# Patient Record
Sex: FEMALE | Race: WHITE | NOT HISPANIC OR LATINO | Employment: FULL TIME | URBAN - METROPOLITAN AREA
[De-identification: names, ages, dates, MRNs, and addresses within clinical notes are randomized per-mention and may not be internally consistent; named-entity substitution may affect disease eponyms.]

---
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
## 2019-01-03 ENCOUNTER — OFFICE VISIT - RIVER FALLS (OUTPATIENT)
Dept: FAMILY MEDICINE | Facility: CLINIC | Age: 19
End: 2019-01-03

## 2019-01-14 ENCOUNTER — OFFICE VISIT - RIVER FALLS (OUTPATIENT)
Dept: FAMILY MEDICINE | Facility: CLINIC | Age: 19
End: 2019-01-14

## 2019-01-14 ASSESSMENT — MIFFLIN-ST. JEOR: SCORE: 1414.63

## 2019-02-28 ENCOUNTER — OFFICE VISIT - RIVER FALLS (OUTPATIENT)
Dept: FAMILY MEDICINE | Facility: CLINIC | Age: 19
End: 2019-02-28

## 2019-02-28 LAB
CLUE CELLS: NORMAL
TRICHOMONAS, WET PREP: NORMAL
YEAST, WET PREP: PRESENT

## 2019-02-28 ASSESSMENT — MIFFLIN-ST. JEOR: SCORE: 1430.05

## 2019-04-06 ENCOUNTER — TRANSFERRED RECORDS (OUTPATIENT)
Dept: HEALTH INFORMATION MANAGEMENT | Facility: CLINIC | Age: 19
End: 2019-04-06

## 2019-04-17 ENCOUNTER — OFFICE VISIT - RIVER FALLS (OUTPATIENT)
Dept: FAMILY MEDICINE | Facility: CLINIC | Age: 19
End: 2019-04-17

## 2019-04-17 LAB
ALBUMIN UR-MCNC: NEGATIVE G/DL
BILIRUB UR QL STRIP: NEGATIVE
GLUCOSE UR STRIP-MCNC: NEGATIVE MG/DL
HCG UR QL: NEGATIVE
HGB UR QL STRIP: NEGATIVE
KETONES UR STRIP-MCNC: NEGATIVE MG/DL
LEUKOCYTE ESTERASE UR QL STRIP: NEGATIVE
NITRATE UR QL: NEGATIVE
PH UR STRIP: 5.5 [PH] (ref 5–8)
SP GR UR STRIP: 1.02 (ref 1–1.03)

## 2019-04-17 ASSESSMENT — MIFFLIN-ST. JEOR: SCORE: 1450.92

## 2019-04-18 LAB — BACTERIA SPEC CULT: NORMAL

## 2019-04-24 ENCOUNTER — MEDICAL CORRESPONDENCE (OUTPATIENT)
Dept: HEALTH INFORMATION MANAGEMENT | Facility: CLINIC | Age: 19
End: 2019-04-24

## 2019-04-24 ENCOUNTER — OFFICE VISIT - RIVER FALLS (OUTPATIENT)
Dept: FAMILY MEDICINE | Facility: CLINIC | Age: 19
End: 2019-04-24

## 2019-04-24 ENCOUNTER — TRANSFERRED RECORDS (OUTPATIENT)
Dept: HEALTH INFORMATION MANAGEMENT | Facility: CLINIC | Age: 19
End: 2019-04-24

## 2019-04-25 LAB
A/G RATIO - HISTORICAL: 1.8 (ref 1–2.5)
ALBUMIN SERPL-MCNC: 4.4 GM/DL (ref 3.6–5.1)
ALP SERPL-CCNC: 48 UNIT/L (ref 47–176)
ALT SERPL W P-5'-P-CCNC: 15 UNIT/L (ref 5–32)
AST SERPL W P-5'-P-CCNC: 14 UNIT/L (ref 12–32)
BILIRUB DIRECT SERPL-MCNC: 0.1 MG/DL
BILIRUB INDIRECT SERPL-MCNC: 0.3 MG/DL (ref 0.2–1.1)
BILIRUB SERPL-MCNC: 0.4 MG/DL (ref 0.2–1.1)
BUN SERPL-MCNC: 16 MG/DL (ref 7–20)
BUN/CREAT RATIO - HISTORICAL: NORMAL (ref 6–22)
CALCIUM SERPL-MCNC: 9.5 MG/DL (ref 8.9–10.4)
CHLORIDE BLD-SCNC: 104 MMOL/L (ref 98–110)
CO2 SERPL-SCNC: 28 MMOL/L (ref 20–32)
CREAT SERPL-MCNC: 0.92 MG/DL (ref 0.5–1)
EGFRCR SERPLBLD CKD-EPI 2021: 90 ML/MIN/1.73M2
ERYTHROCYTE [DISTWIDTH] IN BLOOD BY AUTOMATED COUNT: 12.2 % (ref 11–15)
GLOBULIN: 2.5 (ref 2–3.8)
GLUCOSE BLD-MCNC: 99 MG/DL (ref 65–99)
HCT VFR BLD AUTO: 36.2 % (ref 35–45)
HGB BLD-MCNC: 12.3 GM/DL (ref 11.7–15.5)
LIPASE SERPL-CCNC: 26 UNIT/L (ref 7–60)
MCH RBC QN AUTO: 28.6 PG (ref 27–33)
MCHC RBC AUTO-ENTMCNC: 34 GM/DL (ref 32–36)
MCV RBC AUTO: 84.2 FL (ref 80–100)
PLATELET # BLD AUTO: 320 10*3/UL (ref 140–400)
PMV BLD: 10.4 FL (ref 7.5–12.5)
POTASSIUM BLD-SCNC: 4.3 MMOL/L (ref 3.8–5.1)
PROT SERPL-MCNC: 6.9 GM/DL (ref 6.3–8.2)
RBC # BLD AUTO: 4.3 10*6/UL (ref 3.8–5.1)
SODIUM SERPL-SCNC: 139 MMOL/L (ref 135–146)
WBC # BLD AUTO: 9.1 10*3/UL (ref 3.8–10.8)

## 2019-04-29 ENCOUNTER — OFFICE VISIT - RIVER FALLS (OUTPATIENT)
Dept: FAMILY MEDICINE | Facility: CLINIC | Age: 19
End: 2019-04-29

## 2019-05-01 ENCOUNTER — TELEPHONE (OUTPATIENT)
Dept: GASTROENTEROLOGY | Facility: CLINIC | Age: 19
End: 2019-05-01

## 2019-05-01 NOTE — TELEPHONE ENCOUNTER
[facility] Intake form:    Referring/Requesting provider and Health care System: Dr. Deepthi Cormier [facility]    Requested provider (if specified): Clay    Has patient been evaluated in clinic or had a procedure [facility] provider in the last 5 years: No     Indication/Diagnosis for consultation: Abdominal pain, Spasm, Sphincter of Oddi    Is diagnosis on list of approved diagnosis: yes    Has patient been evaluated by another Gastroenterologist? Unknown     Imaging completed:     CT scan    Yes   MRI         unknown         Procedures:     Upper Endoscopy/EGD   unknown    Endoscopic Ultrasound/EUS unknown    ERCP      unknown    Colonoscopy   unknown    Are images able/being pushed to our system? Yes, Images done through [employer]    Is patient aware of request for clinc consultation and ok to be contacted to schedule? unknown      READ TO REFERRING CLINIC:  Due to high demands for our services our clinic requires all records including imaging studies be mailed and faxed to our facility prior to scheduling. Records should be faxed within 24-72 hours of referral if possible and images should be pushed to our PACS system or received by mail within 72 hours of referral. Our office will NOT call to follow up or request records.  Our clinic will not be able to process, schedule or contact patient without records and Images for MD review process. Once records have been reviewed and recommendation/orders have been made the patient will be contacted to schedule. If records have not been received within 2 weeks of initial referral call, referring office will be notified by letter and referral will be closed. If an appointment is unable to be offered at this time we will inform the referring office and patient via letter.

## 2019-05-03 NOTE — TELEPHONE ENCOUNTER
"Advanced [facility]oscopy     Referring provider: Dr. Deepthi Cormier [facility]      Referred to: [facility]     Provider Requested: Clay     Referral Received:  5/1/19     Records received: 5/3/19     Images received: na    Evaluation for: SOD     Clinical History (per RN review):     Per clinic note from PCP on 4/2619- 18 year old female, s/p resection of GB at age 17, has had intermittent episodes of abd pain since then, but this time the pain and nausea is related to every time she eats, it worsens with meals but never goes away. No dysuria.Clinic note from 9/10/18 also mentions pain since GB removal- inconsistent BMs, intermittent abd pain, weight loss vs weight gain.    - clinic notes from 1/5/18 note \"nauseated after eating once a month since 7th grade, worse this week\" Also noted then that fatty foods trigger her nausea    Abd US 1/26/18 showed GB sludge with no GB wall thickening. Lap Savita 2/21/18    Imaging    CT Abd/Pelvis 4/6/19  - small amount free fluid in right adnexa and cul-de-sac likely explains patients pain from a ruptured ovarian cyst or ovulation  - appendicolith w/out evidence of appendicitis    CT Abd/Pelvis for RLQ pain 9/11/18- Read scanned in  - trace free fluid in the dependent pelvis is nonspecific. This may be physiologic.  - no bowel obstruction, normal appendix  - normal kidneys, ureters    LABS: WNL on 4/26/19  Alk Phos 48  AST 14  ALT 15    * all prior labs also WNL    Ovarian cyst rupture 4/17/19    MD review date: 5/6/19  MD Decision for clinic consultation/Orders: refer to luminal GI- called [facility] to advise           Referral updates/Patient contacted: 5/3/19 talked to Meseret advised we've received referral and will get back to her. Called 5/7/19, advised case was referred to general GI    "

## 2019-05-07 ENCOUNTER — COMMUNICATION - RIVER FALLS (OUTPATIENT)
Dept: FAMILY MEDICINE | Facility: CLINIC | Age: 19
End: 2019-05-07

## 2019-05-07 NOTE — TELEPHONE ENCOUNTER
Ignacio Angulo,    Has this patient been seen in our system yet? Unfortunately I cannot schedule new patients that have not gone through the luminal GI referral process.     Thanks,    Violet

## 2019-05-10 ENCOUNTER — OFFICE VISIT - RIVER FALLS (OUTPATIENT)
Dept: FAMILY MEDICINE | Facility: CLINIC | Age: 19
End: 2019-05-10

## 2019-05-21 ENCOUNTER — OFFICE VISIT - RIVER FALLS (OUTPATIENT)
Dept: FAMILY MEDICINE | Facility: CLINIC | Age: 19
End: 2019-05-21

## 2019-05-24 LAB
ADRENOCORTICOTROPIC HORMONE, P - HISTORICAL: 51 PG/ML (ref 6–50)
CORTISOL: 32.5 MCG/DL
FSH - HISTORICAL: 5.5 MIU/ML
GROWTH HORMONE: 1.5 NG/ML
LUTEINIZING HORMONE - HISTORICAL: 5.6 MIU/ML
PROLACTIN: 22.4 NG/ML
T3FREE SERPL-MCNC: 2.5 PG/ML (ref 3–4.7)
T4 FREE SERPL-MCNC: 1 NG/DL (ref 0.8–1.4)
TSH SERPL DL<=0.005 MIU/L-ACNC: 53.01 MIU/L

## 2019-05-28 ENCOUNTER — COMMUNICATION - RIVER FALLS (OUTPATIENT)
Dept: FAMILY MEDICINE | Facility: CLINIC | Age: 19
End: 2019-05-28

## 2019-10-10 ENCOUNTER — OFFICE VISIT - RIVER FALLS (OUTPATIENT)
Dept: FAMILY MEDICINE | Facility: CLINIC | Age: 19
End: 2019-10-10

## 2019-10-10 LAB
ANION GAP SERPL CALCULATED.3IONS-SCNC: 9 MMOL/L (ref 5–18)
BASOPHILS # BLD MANUAL: 0 10*3/UL
BASOPHILS NFR BLD MANUAL: 0.4 %
BUN SERPL-MCNC: 12 MG/DL (ref 8–25)
BUN/CREAT RATIO - HISTORICAL: 13 (ref 10–20)
CALCIUM SERPL-MCNC: 10.3 MG/DL (ref 8.5–10.5)
CHLORIDE BLD-SCNC: 107 MMOL/L (ref 98–110)
CO2 SERPL-SCNC: 23 MMOL/L (ref 21–31)
CREAT SERPL-MCNC: 0.89 MG/DL (ref 0.57–1.11)
EOSINOPHIL # BLD MANUAL: 0.2 10*3/UL
EOSINOPHIL NFR BLD MANUAL: 3.1 %
ERYTHROCYTE [DISTWIDTH] IN BLOOD BY AUTOMATED COUNT: 12.5 % (ref 11.5–15.5)
GFR ESTIMATE EXT - HISTORICAL: >60
GLUCOSE BLD-MCNC: 106 MG/DL (ref 65–100)
HCT VFR BLD AUTO: 35.7 % (ref 33–51)
HGB BLD-MCNC: 12.3 G/DL (ref 12–16)
LYMPHOCYTES # BLD MANUAL: 1.9 10*3/UL (ref 0.9–2.9)
LYMPHOCYTES NFR BLD MANUAL: 24.5 %
MCH RBC QN AUTO: 28.1 PG (ref 26–34)
MCHC RBC AUTO-ENTMCNC: 34.5 G/DL (ref 32–36)
MCV RBC AUTO: 82 FL (ref 80–100)
MONOCYTES # BLD MANUAL: 0.6 10*3/UL
MONOCYTES NFR BLD MANUAL: 7.9 %
NEUTROPHILS # BLD MANUAL: 4.9 10*3/UL (ref 1.7–7)
NEUTROPHILS NFR BLD MANUAL: 64.1 %
PLATELET # BLD AUTO: 281 10*3/UL (ref 140–440)
PMV BLD: 9.7 FL (ref 6.5–11)
POTASSIUM BLD-SCNC: 4 MMOL/L (ref 3.5–5)
RBC # BLD AUTO: 4.37 10*6/UL (ref 4–5.2)
SODIUM SERPL-SCNC: 139 MMOL/L (ref 135–145)
WBC # BLD AUTO: 7.6 10*3/UL (ref 4.5–11)

## 2019-10-10 ASSESSMENT — MIFFLIN-ST. JEOR: SCORE: 1429.14

## 2019-10-23 ENCOUNTER — OFFICE VISIT - RIVER FALLS (OUTPATIENT)
Dept: FAMILY MEDICINE | Facility: CLINIC | Age: 19
End: 2019-10-23

## 2019-10-23 LAB
ALBUMIN UR-MCNC: NEGATIVE G/DL
AMYLASE SERPL-CCNC: 36 IU/L (ref 25–125)
ANION GAP SERPL CALCULATED.3IONS-SCNC: 10 MMOL/L (ref 5–18)
BACTERIA #/AREA URNS HPF: NORMAL /[HPF]
BILIRUB UR QL STRIP: NEGATIVE
BUN SERPL-MCNC: 12 MG/DL (ref 8–25)
BUN/CREAT RATIO - HISTORICAL: 14 (ref 10–20)
CALCIUM SERPL-MCNC: 9.6 MG/DL (ref 8.5–10.5)
CHLORIDE BLD-SCNC: 106 MMOL/L (ref 98–110)
CO2 SERPL-SCNC: 24 MMOL/L (ref 21–31)
CREAT SERPL-MCNC: 0.86 MG/DL (ref 0.57–1.11)
EPITHELIAL CELLS UR: NORMAL
ERYTHROCYTE [DISTWIDTH] IN BLOOD BY AUTOMATED COUNT: 12.7 % (ref 11.5–15.5)
GFR ESTIMATE EXT - HISTORICAL: >60
GLUCOSE BLD-MCNC: 111 MG/DL (ref 65–100)
GLUCOSE UR STRIP-MCNC: NEGATIVE MG/DL
HCG UR QL: NEGATIVE
HCT VFR BLD AUTO: 40 % (ref 33–51)
HGB BLD-MCNC: 13.8 G/DL (ref 12–16)
HGB UR QL STRIP: ABNORMAL
KETONES UR STRIP-MCNC: ABNORMAL MG/DL
LEUKOCYTE ESTERASE UR QL STRIP: NEGATIVE
MCH RBC QN AUTO: 28.2 PG (ref 26–34)
MCHC RBC AUTO-ENTMCNC: 34.5 G/DL (ref 32–36)
MCV RBC AUTO: 82 FL (ref 80–100)
NITRATE UR QL: NEGATIVE
PH UR STRIP: 6 [PH] (ref 5–8)
PLATELET # BLD AUTO: 302 10*3/UL (ref 140–440)
PMV BLD: 10 FL (ref 6.5–11)
POTASSIUM BLD-SCNC: 4.1 MMOL/L (ref 3.5–5)
RBC # BLD AUTO: 4.9 10*6/UL (ref 4–5.2)
RBC #/AREA URNS AUTO: NORMAL /[HPF]
SODIUM SERPL-SCNC: 140 MMOL/L (ref 135–145)
SP GR UR STRIP: 1.02 (ref 1–1.03)
WBC # BLD AUTO: 20.8 10*3/UL (ref 4.5–11)
WBC #/AREA URNS AUTO: NORMAL /[HPF]

## 2019-10-24 LAB
A/G RATIO - HISTORICAL: 1.6 (ref 1–2.5)
ALBUMIN SERPL-MCNC: 4.6 GM/DL (ref 3.6–5.1)
ALP SERPL-CCNC: 46 UNIT/L (ref 47–176)
ALT SERPL W P-5'-P-CCNC: 10 UNIT/L (ref 5–32)
AST SERPL W P-5'-P-CCNC: 12 UNIT/L (ref 12–32)
BILIRUB DIRECT SERPL-MCNC: 0.1 MG/DL
BILIRUB INDIRECT SERPL-MCNC: 0.4 MG/DL (ref 0.2–1.1)
BILIRUB SERPL-MCNC: 0.5 MG/DL (ref 0.2–1.1)
GLOBULIN: 2.8 (ref 2–3.8)
LIPASE SERPL-CCNC: 10 UNIT/L (ref 7–60)
PROT SERPL-MCNC: 7.4 GM/DL (ref 6.3–8.2)
TSH SERPL DL<=0.005 MIU/L-ACNC: 1.25 MIU/L

## 2019-11-12 ENCOUNTER — OFFICE VISIT - RIVER FALLS (OUTPATIENT)
Dept: FAMILY MEDICINE | Facility: CLINIC | Age: 19
End: 2019-11-12

## 2019-11-12 ASSESSMENT — MIFFLIN-ST. JEOR: SCORE: 1410.09

## 2020-09-09 ENCOUNTER — OFFICE VISIT - RIVER FALLS (OUTPATIENT)
Dept: FAMILY MEDICINE | Facility: CLINIC | Age: 20
End: 2020-09-09

## 2020-09-09 ENCOUNTER — COMMUNICATION - RIVER FALLS (OUTPATIENT)
Dept: FAMILY MEDICINE | Facility: CLINIC | Age: 20
End: 2020-09-09

## 2020-09-09 ENCOUNTER — AMBULATORY - RIVER FALLS (OUTPATIENT)
Dept: FAMILY MEDICINE | Facility: CLINIC | Age: 20
End: 2020-09-09

## 2020-09-09 LAB — DEPRECATED S PYO AG THROAT QL EIA: NOT DETECTED

## 2020-12-03 ENCOUNTER — OFFICE VISIT - RIVER FALLS (OUTPATIENT)
Dept: FAMILY MEDICINE | Facility: CLINIC | Age: 20
End: 2020-12-03

## 2020-12-03 LAB
CLUE CELLS: NORMAL
TRICHOMONAS, WET PREP: NORMAL
YEAST, WET PREP: PRESENT

## 2020-12-04 ENCOUNTER — COMMUNICATION - RIVER FALLS (OUTPATIENT)
Dept: FAMILY MEDICINE | Facility: CLINIC | Age: 20
End: 2020-12-04

## 2020-12-04 LAB
CHLAMYDIA TRACHOMATIS RNA, TMA - QUEST: NOT DETECTED
NEISSERIA GONORRHOEAE RNA TMA: NOT DETECTED

## 2020-12-07 ENCOUNTER — COMMUNICATION - RIVER FALLS (OUTPATIENT)
Dept: FAMILY MEDICINE | Facility: CLINIC | Age: 20
End: 2020-12-07

## 2020-12-30 ENCOUNTER — AMBULATORY - RIVER FALLS (OUTPATIENT)
Dept: FAMILY MEDICINE | Facility: CLINIC | Age: 20
End: 2020-12-30

## 2020-12-30 ENCOUNTER — COMMUNICATION - RIVER FALLS (OUTPATIENT)
Dept: FAMILY MEDICINE | Facility: CLINIC | Age: 20
End: 2020-12-30

## 2020-12-30 ENCOUNTER — OFFICE VISIT - RIVER FALLS (OUTPATIENT)
Dept: FAMILY MEDICINE | Facility: CLINIC | Age: 20
End: 2020-12-30

## 2021-01-04 LAB — SARS-COV-2 RNA RESP QL NAA+PROBE: NEGATIVE

## 2021-01-11 ENCOUNTER — OFFICE VISIT - RIVER FALLS (OUTPATIENT)
Dept: FAMILY MEDICINE | Facility: CLINIC | Age: 21
End: 2021-01-11

## 2021-01-11 ENCOUNTER — COMMUNICATION - RIVER FALLS (OUTPATIENT)
Dept: FAMILY MEDICINE | Facility: CLINIC | Age: 21
End: 2021-01-11

## 2021-01-12 ENCOUNTER — OFFICE VISIT - RIVER FALLS (OUTPATIENT)
Dept: FAMILY MEDICINE | Facility: CLINIC | Age: 21
End: 2021-01-12

## 2021-01-12 ENCOUNTER — AMBULATORY - RIVER FALLS (OUTPATIENT)
Dept: FAMILY MEDICINE | Facility: CLINIC | Age: 21
End: 2021-01-12

## 2021-01-12 LAB — DEPRECATED S PYO AG THROAT QL EIA: NOT DETECTED

## 2021-01-18 LAB — SARS-COV-2 RNA RESP QL NAA+PROBE: NEGATIVE

## 2021-02-26 ENCOUNTER — AMBULATORY - RIVER FALLS (OUTPATIENT)
Dept: FAMILY MEDICINE | Facility: CLINIC | Age: 21
End: 2021-02-26

## 2021-03-01 ENCOUNTER — AMBULATORY - RIVER FALLS (OUTPATIENT)
Dept: FAMILY MEDICINE | Facility: CLINIC | Age: 21
End: 2021-03-01

## 2021-03-08 ENCOUNTER — AMBULATORY - RIVER FALLS (OUTPATIENT)
Dept: FAMILY MEDICINE | Facility: CLINIC | Age: 21
End: 2021-03-08

## 2021-03-11 ENCOUNTER — AMBULATORY - RIVER FALLS (OUTPATIENT)
Dept: FAMILY MEDICINE | Facility: CLINIC | Age: 21
End: 2021-03-11

## 2021-04-07 ENCOUNTER — OFFICE VISIT - RIVER FALLS (OUTPATIENT)
Dept: FAMILY MEDICINE | Facility: CLINIC | Age: 21
End: 2021-04-07

## 2021-04-07 ASSESSMENT — MIFFLIN-ST. JEOR: SCORE: 1478.47

## 2021-04-08 LAB
CHLAMYDIA TRACHOMATIS RNA, TMA - QUEST: NOT DETECTED
NEISSERIA GONORRHOEAE RNA TMA: NOT DETECTED

## 2021-04-30 ENCOUNTER — OFFICE VISIT - RIVER FALLS (OUTPATIENT)
Dept: FAMILY MEDICINE | Facility: CLINIC | Age: 21
End: 2021-04-30

## 2021-04-30 ASSESSMENT — MIFFLIN-ST. JEOR: SCORE: 1483.92

## 2021-06-10 ENCOUNTER — TRANSFERRED RECORDS (OUTPATIENT)
Dept: MULTI SPECIALTY CLINIC | Facility: CLINIC | Age: 21
End: 2021-06-10

## 2021-06-10 LAB — PAP-ABSTRACT: NORMAL

## 2022-01-14 ENCOUNTER — OFFICE VISIT - RIVER FALLS (OUTPATIENT)
Dept: FAMILY MEDICINE | Facility: CLINIC | Age: 22
End: 2022-01-14

## 2022-01-16 LAB
CHLAMYDIA TRACHOMATIS RNA, TMA - QUEST: NOT DETECTED
NEISSERIA GONORRHOEAE RNA TMA: NOT DETECTED

## 2022-01-24 ENCOUNTER — OFFICE VISIT - RIVER FALLS (OUTPATIENT)
Dept: FAMILY MEDICINE | Facility: CLINIC | Age: 22
End: 2022-01-24

## 2022-01-24 ENCOUNTER — LAB REQUISITION (OUTPATIENT)
Dept: LAB | Facility: CLINIC | Age: 22
End: 2022-01-24
Payer: COMMERCIAL

## 2022-01-24 DIAGNOSIS — U07.1 COVID-19: ICD-10-CM

## 2022-01-24 PROCEDURE — U0003 INFECTIOUS AGENT DETECTION BY NUCLEIC ACID (DNA OR RNA); SEVERE ACUTE RESPIRATORY SYNDROME CORONAVIRUS 2 (SARS-COV-2) (CORONAVIRUS DISEASE [COVID-19]), AMPLIFIED PROBE TECHNIQUE, MAKING USE OF HIGH THROUGHPUT TECHNOLOGIES AS DESCRIBED BY CMS-2020-01-R: HCPCS | Mod: ORL | Performed by: FAMILY MEDICINE

## 2022-01-25 ENCOUNTER — COMMUNICATION - RIVER FALLS (OUTPATIENT)
Dept: FAMILY MEDICINE | Facility: CLINIC | Age: 22
End: 2022-01-25

## 2022-01-25 LAB — SARS-COV-2 RNA RESP QL NAA+PROBE: POSITIVE

## 2022-01-26 LAB — SARS-COV-2 RNA RESP QL NAA+PROBE: POSITIVE

## 2022-02-11 VITALS
OXYGEN SATURATION: 99 % | DIASTOLIC BLOOD PRESSURE: 60 MMHG | TEMPERATURE: 98.2 F | HEART RATE: 88 BPM | WEIGHT: 159.9 LBS | SYSTOLIC BLOOD PRESSURE: 124 MMHG | BODY MASS INDEX: 28.55 KG/M2

## 2022-02-11 VITALS
SYSTOLIC BLOOD PRESSURE: 110 MMHG | WEIGHT: 139.6 LBS | HEIGHT: 63 IN | DIASTOLIC BLOOD PRESSURE: 64 MMHG | WEIGHT: 140.2 LBS | SYSTOLIC BLOOD PRESSURE: 122 MMHG | TEMPERATURE: 97.7 F | DIASTOLIC BLOOD PRESSURE: 64 MMHG | TEMPERATURE: 100.6 F | DIASTOLIC BLOOD PRESSURE: 68 MMHG | WEIGHT: 143 LBS | OXYGEN SATURATION: 99 % | HEART RATE: 60 BPM | WEIGHT: 144.75 LBS | SYSTOLIC BLOOD PRESSURE: 122 MMHG | HEART RATE: 112 BPM | DIASTOLIC BLOOD PRESSURE: 75 MMHG | BODY MASS INDEX: 24.84 KG/M2 | DIASTOLIC BLOOD PRESSURE: 52 MMHG | HEART RATE: 70 BPM | SYSTOLIC BLOOD PRESSURE: 110 MMHG | DIASTOLIC BLOOD PRESSURE: 60 MMHG | BODY MASS INDEX: 25.53 KG/M2 | HEART RATE: 75 BPM | HEART RATE: 96 BPM | HEIGHT: 63 IN | SYSTOLIC BLOOD PRESSURE: 117 MMHG | HEART RATE: 73 BPM | BODY MASS INDEX: 25.65 KG/M2 | SYSTOLIC BLOOD PRESSURE: 100 MMHG | TEMPERATURE: 97.7 F | TEMPERATURE: 97.5 F | TEMPERATURE: 98.3 F | BODY MASS INDEX: 25.34 KG/M2 | HEIGHT: 63 IN | TEMPERATURE: 98.3 F | HEIGHT: 63 IN | WEIGHT: 138 LBS

## 2022-02-12 VITALS
SYSTOLIC BLOOD PRESSURE: 110 MMHG | TEMPERATURE: 98.5 F | DIASTOLIC BLOOD PRESSURE: 72 MMHG | TEMPERATURE: 98.8 F | DIASTOLIC BLOOD PRESSURE: 60 MMHG | DIASTOLIC BLOOD PRESSURE: 64 MMHG | WEIGHT: 158.6 LBS | SYSTOLIC BLOOD PRESSURE: 130 MMHG | HEIGHT: 63 IN | WEIGHT: 160 LBS | TEMPERATURE: 98.2 F | BODY MASS INDEX: 27.5 KG/M2 | TEMPERATURE: 97.4 F | DIASTOLIC BLOOD PRESSURE: 60 MMHG | BODY MASS INDEX: 28.1 KG/M2 | HEART RATE: 83 BPM | HEART RATE: 92 BPM | DIASTOLIC BLOOD PRESSURE: 70 MMHG | BODY MASS INDEX: 27.54 KG/M2 | BODY MASS INDEX: 28.32 KG/M2 | SYSTOLIC BLOOD PRESSURE: 118 MMHG | HEART RATE: 80 BPM | OXYGEN SATURATION: 96 % | SYSTOLIC BLOOD PRESSURE: 90 MMHG | HEART RATE: 68 BPM | OXYGEN SATURATION: 98 % | HEIGHT: 63 IN | WEIGHT: 157.4 LBS | WEIGHT: 155.4 LBS | WEIGHT: 154 LBS | SYSTOLIC BLOOD PRESSURE: 122 MMHG | SYSTOLIC BLOOD PRESSURE: 100 MMHG | HEART RATE: 84 BPM | WEIGHT: 155.4 LBS | HEART RATE: 74 BPM | DIASTOLIC BLOOD PRESSURE: 66 MMHG | BODY MASS INDEX: 27.75 KG/M2 | TEMPERATURE: 97.7 F | BODY MASS INDEX: 28.35 KG/M2 | OXYGEN SATURATION: 99 %

## 2022-02-12 VITALS
BODY MASS INDEX: 29.27 KG/M2 | WEIGHT: 166.4 LBS | BODY MASS INDEX: 29.48 KG/M2 | SYSTOLIC BLOOD PRESSURE: 140 MMHG | TEMPERATURE: 98.1 F | WEIGHT: 165.2 LBS | HEART RATE: 90 BPM | HEIGHT: 63 IN | DIASTOLIC BLOOD PRESSURE: 66 MMHG | HEIGHT: 63 IN | SYSTOLIC BLOOD PRESSURE: 112 MMHG | OXYGEN SATURATION: 98 % | OXYGEN SATURATION: 98 % | TEMPERATURE: 97.6 F | DIASTOLIC BLOOD PRESSURE: 68 MMHG | HEART RATE: 94 BPM

## 2022-02-12 VITALS
SYSTOLIC BLOOD PRESSURE: 118 MMHG | TEMPERATURE: 98.9 F | WEIGHT: 155.2 LBS | HEART RATE: 72 BPM | OXYGEN SATURATION: 98 % | DIASTOLIC BLOOD PRESSURE: 64 MMHG | TEMPERATURE: 98.2 F | BODY MASS INDEX: 27.5 KG/M2 | HEART RATE: 74 BPM | DIASTOLIC BLOOD PRESSURE: 76 MMHG | DIASTOLIC BLOOD PRESSURE: 62 MMHG | WEIGHT: 151 LBS | SYSTOLIC BLOOD PRESSURE: 110 MMHG | HEART RATE: 86 BPM | HEIGHT: 63 IN | HEIGHT: 63 IN | SYSTOLIC BLOOD PRESSURE: 121 MMHG | BODY MASS INDEX: 26.75 KG/M2

## 2022-02-12 VITALS
HEART RATE: 74 BPM | DIASTOLIC BLOOD PRESSURE: 68 MMHG | WEIGHT: 151.4 LBS | WEIGHT: 152 LBS | BODY MASS INDEX: 26.93 KG/M2 | SYSTOLIC BLOOD PRESSURE: 124 MMHG | BODY MASS INDEX: 27.21 KG/M2 | BODY MASS INDEX: 27.03 KG/M2 | SYSTOLIC BLOOD PRESSURE: 128 MMHG | TEMPERATURE: 98.5 F | WEIGHT: 153.6 LBS | BODY MASS INDEX: 26.08 KG/M2 | HEIGHT: 63 IN | DIASTOLIC BLOOD PRESSURE: 78 MMHG | HEART RATE: 92 BPM | TEMPERATURE: 98.5 F | SYSTOLIC BLOOD PRESSURE: 140 MMHG | HEART RATE: 76 BPM | SYSTOLIC BLOOD PRESSURE: 118 MMHG | TEMPERATURE: 97.8 F | HEIGHT: 63 IN | HEIGHT: 63 IN | DIASTOLIC BLOOD PRESSURE: 76 MMHG | HEART RATE: 78 BPM | WEIGHT: 147.2 LBS | DIASTOLIC BLOOD PRESSURE: 70 MMHG

## 2022-02-15 NOTE — NURSING NOTE
PPD Reading POC Entered On:  3/1/2021 11:17 AM CST    Performed On:  3/1/2021 11:16 AM CST by Rita Mike RN               PPD Reading   PPD mm of Induration :   0 mm   PPD Interpretation :   Negative   PPD Completion Status :   Completed   PPD Result Comment :   Pt will return for step 2   Rita Mike RN - 3/1/2021 11:16 AM CST

## 2022-02-15 NOTE — PROGRESS NOTES
Chief Complaint    Nini presents for follow-up BV a couple week- increase discharge color is pink/gray some chunks, itchiness in genitle area x ongoing with some improvement noted than got worse about a week ago.  History of Present Illness      Chief complaint as above reviewed and confirmed with patient.  Pt presents to the clinic with concerns re: vaginal discharge change, vaginal irritation.  Seen for BV 3 weeks ago. That improved with tx with vaginal metronidazole.  This week passed some discharge that looked like tissue, friable, clumpy brown/blood tinged debris (brings picture, does not appear to be POC but possible some endometrial tissue)  denies any abdominal pain.  Recently had IUD placed and has string check in 1.5 weeks.  has had mild cramping and spotting following placement but that is generally improved.  spotting improved after passing some of the tissue as well.  Review of Systems      Review of systems is negative with the exception of those noted in HPI          Physical Exam   Vitals & Measurements    T: 97.6  F (Tympanic)  HR: 94 (Peripheral)  BP: 112/66  SpO2: 98%     HT: 63 in  WT: 166.4 lb  BMI: 29.47       External genitalia that of normal young female.      no genital lesions present      vaginal mucosa is well rugated      cervix is closed with small amount of blood tinged brownish discharge present.  no mucopurulent discharge. Strings visualized and appear to be well positioned IUD.       adnexa is without masses or tenderness      no CMT present       wet prep:  negative for yeast, clue cells or trichomonas.         Assessment/Plan       Spotting (N92.0)         likely due to recent IUD placement. reassured neg UPT.         Ordered:          Urine Pregnancy Test (Request), Priority: STAT, Vaginal discharge  Spotting          Wet Prep Vaginal (Request), Priority: STAT, Vaginal discharge  Spotting                Vaginal discharge (N89.8)         observation, tissue she passed likely  endometrial, but difficult to say for certain, possible from recent IUD pllacement.         reassured strings in place, observation, warm soaks , avoid topical irritants. fu prn         Ordered:          Urine Pregnancy Test (Request), Priority: STAT, Vaginal discharge  Spotting          Wet Prep Vaginal (Request), Priority: STAT, Vaginal discharge  Spotting           Patient Information     Name:OBI BLACKWOOD      Address:      [address] [place] [id number]     Sex:Female     YOB: 2000     [phone]     Emergency Contact:JESUS BLACKWOOD     MRN:[id number]     FIN:[id number]     Location:[facility]     Date of Service:04/30/2021      Primary Care Physician:       NONE ,       Attending Physician:       Bobbi MORGAN, Justina HARRIS, [phone]  Problem List/Past Medical History    Ongoing     Abnormal brain MRI       Comments: convex areaon pituitary seen on brain MRI     Chronic abdominal pain     Eczema     Hx of cholecystectomy     Mild intermittent asthma     Pituitary adenoma    Historical     No qualifying data  Procedure/Surgical History     Laparoscopic cholecystectomy (02/21/2018)           Extraction of wisdom tooth        Medications    iron polysaccharide (as elemental iron) 200 mg oral capsule, 200 mg= 1 cap(s), Oral, daily    levothyroxine 125 mcg (0.125 mg) oral tablet, 125 mcg= 1 tab(s), Oral, daily    metroNIDAZOLE 0.75% vaginal gel with applicator, 1 nickolas, Vaginal, hs  Allergies    Dust Mite    Omnicef (Hives)  Social History    Smoking Status     Never smoker     Electronic Cigarette/Vaping      Electronic Cigarette Use: has vaped in the past; nothing recently.     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     Home/Environment      Marital status: Single. Risks in environment: Unlocked guns, Stairs.     Nutrition/Health      Type of diet: Regular.     Sexual      Sexually active: Yes.  Identifies as female, Sexual orientation: Straight or heterosexual. Uses condoms: Yes. Contraceptive Use Details: Birth control pill.     Substance Abuse      Never     Tobacco      Never (less than 100 in lifetime)  Family History    Cancer: Uncle (M).    Depression: Mother.    Diabetes: Grandmother (P).    Hypertension: Grandfather (P).    Migraine: Mother.    Obese: Grandmother (P).  Immunizations      Vaccine Date Status          influenza virus vaccine, inactivated 02/26/2021 Given          human papillomavirus vaccine 03/20/2018 Recorded          human papillomavirus vaccine 07/24/2017 Recorded          meningococcal conjugate vaccine 03/13/2017 Recorded          human papillomavirus vaccine 03/13/2017 Recorded          tetanus/diphth/pertuss (Tdap) adult/adol 10/07/2011 Recorded          influenza virus vaccine, inactivated 12/21/2009 Recorded          influenza virus vaccine, inactivated 10/31/2008 Recorded          varicella 08/29/2008 Recorded          IPV 08/25/2005 Recorded          MMR (measles/mumps/rubella) 08/25/2005 Recorded          DTaP 08/25/2005 Recorded          IPV 05/10/2001 Recorded          DTaP 05/10/2001 Recorded          varicella 11/21/2000 Recorded          MMR (measles/mumps/rubella) 11/21/2000 Recorded          Hep B 11/21/2000 Recorded          Hib 07/21/2000 Recorded          DTaP 07/21/2000 Recorded          Hib (PRP-T) 07/21/2000 Recorded          IPV 04/10/2000 Recorded          DTaP 04/10/2000 Recorded          Hep B-Hib 04/10/2000 Recorded          IPV 02/14/2000 Recorded          DTaP 02/14/2000 Recorded          Hep B-Hib 02/14/2000 Recorded  Lab Results       Lab Results (Last 4 results within 90 days)        Chlam/N. gonorrhea Comments: See comment (04/07/21 16:38:00)       Chlamydia RNA: NOT DETECTED (04/07/21 16:38:00)       Neisseria gonorrhoeae RNA: NOT DETECTED (04/07/21 16:38:00)       POC Test Comments: Pascual chung (03/08/21 14:34:00)       POC Test Comments: Step  2 (03/08/21 14:34:00)

## 2022-02-15 NOTE — LETTER
(Inserted Image. Unable to display) May 30, 2019Re: BOI KAMARAOB:  2000 [facility]  [address] [place], [place] [address]Rh:  [facility] The following patient has been referred to your office/practice:   OBI BLACKWOOD Appointment : Patient made or is making her own appointmentLocation : [place] Please refer to the attached clinical documentation for a summary of OBI's care.  Please do not hesitate to contact our office if any additional clinical questions arise. All relevant records and transition of care documents should be mailed or faxed.Your assistance in providing continuity of care is appreciatedSincerely, [facility] & [address] [address](P) [phone](F) [phone]

## 2022-02-15 NOTE — LETTER
(Inserted Image. Unable to display)       April 25, 2019      OBI BLACKWOOD  [address]  [address] [place], [place] [id number]        Dear OBI,     Thank you for selecting [facility] for your healthcare needs. Below you will find the results of your recent test(s) done at our clinic.      Your results are normal!  Please come back for a follow up appointment if you are still having symptoms.       Result Name Current Result Previous Result Reference Range   Sodium Level (mmol/L)  139 4/24/2019  141 9/10/2018 135 - 146   Potassium Level (mmol/L)  4.3 4/24/2019  4.0 9/10/2018 3.8 - 5.1   Chloride Level (mmol/L)  104 4/24/2019  108 9/10/2018 98 - 110   CO2 Level (mmol/L)  28 4/24/2019  25 9/10/2018 20 - 32   Glucose Level (mg/dL)  99 4/24/2019  97 9/10/2018 65 - 99   BUN (mg/dL)  16 4/24/2019  8 9/10/2018 7 - 20   Creatinine Level (mg/dL)  0.92 4/24/2019  0.87 9/10/2018 0.50 - 1.00   BUN/Creat Ratio  NOT APPLICABLE 4/24/2019 ((L)) 9 9/10/2018 6 - 22   eGFR (mL/min/1.73m2)  90 4/24/2019  > OR = 60 -    eGFR  (mL/min/1.73m2)  105 4/24/2019  >60 9/10/2018 > OR = 60 -    Calcium Level (mg/dL)  9.5 4/24/2019  9.5 9/10/2018 8.9 - 10.4   Bilirubin Total (mg/dL)  0.4 4/24/2019  0.2 - 1.1   Bilirubin Direct (mg/dL)  0.1 4/24/2019   - < OR = 0.2   Bilirubin Indirect  0.3 4/24/2019  0.2 - 1.1   Alkaline Phosphatase (unit/L)  48 4/24/2019  47 - 176   AST/SGOT (unit/L)  14 4/24/2019  12 - 32   ALT/SGPT (unit/L)  15 4/24/2019  5 - 32   Protein Total (gm/dL)  6.9 4/24/2019  6.3 - 8.2   Albumin Level (gm/dL)  4.4 4/24/2019  3.6 - 5.1   Globulin  2.5 4/24/2019  2.0 - 3.8   A/G Ratio  1.8 4/24/2019  1.0 - 2.5   Lipase Level (unit/L)  26 4/24/2019  7 - 60   WBC  9.1 4/24/2019  8.4 9/11/2018 3.8 - 10.8   RBC  4.30 4/24/2019  4.34 9/11/2018 3.80 - 5.10   Hgb (gm/dL)  12.3 4/24/2019 ((L)) 11.9 9/11/2018 11.7 - 15.5   Hct (%)  36.2 4/24/2019  35.4 9/11/2018 35.0 - 45.0   MCV (fL)  84.2 4/24/2019   82 9/11/2018 80.0 - 100.0   MCH (pg)  28.6 4/24/2019  27.4 9/11/2018 27.0 - 33.0   MCHC (gm/dL)  34.0 4/24/2019  33.6 9/11/2018 32.0 - 36.0   RDW (%)  12.2 4/24/2019  13.1 9/11/2018 11.0 - 15.0   Platelet  320 4/24/2019  316 9/11/2018 140 - 400   MPV (fL)  10.4 4/24/2019  10.0 9/11/2018 7.5 - 12.5     Please contact my practice at [phone] if you have any questions or concerns.     Sincerely,        Deepthi Cormier MD      What do your labs mean?  Below is a glossary of commonly ordered labs:  LDL   Bad Cholesterol   HDL   Good Cholesterol  AST/ALT   Liver Function   Cr/Creatinine   Kidney Function  Microalbumin   Kidney Function  BUN   Kidney Function  PSA   Prostate    TSH   Thyroid Hormone  HgbA1c   Diabetes Test   Hgb (Hemoglobin)   Red Blood Cells

## 2022-02-15 NOTE — LETTER
(Inserted Image. Unable to display)                                                                                                [address] [facility] [address]                                                April 26, 2019Re: OBI FOYON2000Raheem Williamson M.D.[facility] [address] [place] [id number]: Dr. WilliamsonThe following patient has been referred to your office/practice:  OBI BLACKWOOD Appointment:  Appointment is PendingPlease refer to the attached  clinical documentation for a summary of OBI's care.  Please do not hesitate to contact our office if any additional clinical questions arise.  All relevant records and transition of care documents should be mailed or faxed.Your assistance in providing continuity of care is appreciated. Sincerely, [facility] & [place] [place] [address](P) [phone](F) [phone]

## 2022-02-15 NOTE — NURSING NOTE
PPD Administration POC Entered On:  3/8/2021 2:35 PM CST    Performed On:  3/8/2021 2:34 PM CST by Rita Mike RN               PPD Administration   PPD Insertion Site :   Left forearm   POC Test Comments :   Wheal created   PPD Amount Administered (mL) :   0.1 mL   Rita Mike RN - 3/8/2021 2:34 PM CST   Details   Collection Date :   3/8/2021 2:31 PM CST   Expiration Date :   9/19/2022 CDT   Lot#/Manufacture :   [id number]    :   [employer]   POC Test Comments :   Step 2   Rita Mike RN - 3/8/2021 2:34 PM CST

## 2022-02-15 NOTE — NURSING NOTE
Comprehensive Intake Entered On:  1/14/2019 1:05 PM CST    Performed On:  1/14/2019 1:01 PM CST by Katie Hickman CMA               Summary   Chief Complaint :   Follow up concussion, has improved in the last 5 days   Menstrual Status :   Menarcheal   Weight Measured :   152 lb(Converted to: 152 lb 0 oz, 68.95 kg)    Height Measured :   62.75 in(Converted to: 5 ft 3 in, 159.38 cm)    Body Mass Index :   27.14 kg/m2   Body Surface Area :   1.75 m2   Systolic Blood Pressure :   118 mmHg   Diastolic Blood Pressure :   68 mmHg   Mean Arterial Pressure :   85 mmHg   Peripheral Pulse Rate :   74 bpm   BP Site :   Right arm   Pulse Site :   Radial artery   BP Method :   Manual   HR Method :   Manual   Temperature Tympanic :   98.5 DegF(Converted to: 36.9 DegC)    Katie Hickman CMA - 1/14/2019 1:01 PM CST   Health Status   Allergies Verified? :   Yes   Medication History Verified? :   Yes   Immunizations Current :   No   Medical History Verified? :   No   Pre-Visit Planning Status :   Completed   Tobacco Use? :   Never smoker   Katie Hickman CMA - 1/14/2019 1:01 PM CST   Meds / Allergies   (As Of: 1/14/2019 1:05:24 PM CST)   Allergies (Active)   Omnicef  Estimated Onset Date:   Unspecified ; Reactions:   Hives ; Created By:   Isabel Estrada CMA; Reaction Status:   Active ; Category:   Drug ; Substance:   Omnicef ; Type:   Allergy ; Updated By:   Isabel Estrada CMA; Reviewed Date:   1/14/2019 1:03 PM CST        Medication List   (As Of: 1/14/2019 1:05:24 PM CST)   Prescription/Discharge Order    cyclobenzaprine  :   cyclobenzaprine ; Status:   Prescribed ; Ordered As Mnemonic:   cyclobenzaprine 10 mg oral tablet ; Simple Display Line:   10 mg, 1 tab(s), PO, TID, PRN: for spasm, 30 tab(s), 0 Refill(s) ; Ordering Provider:   Teo Hernandes PA-C; Catalog Code:   cyclobenzaprine ; Order Dt/Tm:   12/1/2018 9:29:22 AM            Home Meds    ethinyl estradiol-norgestimate  :   ethinyl estradiol-norgestimate ; Status:    Documented ; Ordered As Mnemonic:   Sprintec 0.25 mg-35 mcg oral tablet ; Simple Display Line:   1 tab(s), Oral, daily, 0 Refill(s) ; Catalog Code:   ethinyl estradiol-norgestimate ; Order Dt/Tm:   9/10/2018 9:34:25 AM

## 2022-02-15 NOTE — PROGRESS NOTES
Patient:   OBI BLACKWOOD            MRN: [id number]            FIN: [id number]               Age:   18 years     Sex:  Female     :  2000   Associated Diagnoses:   Brain concussion   Author:   Steven Malik MD      Visit Information      Date of Service: 2018 10:18 am  Performing Location: [facility]  Encounter#: [id number]      Primary Care Provider (PCP):  NONE ,       Referring Provider:  Steven Malik MD    NPI# [id number]      Chief Complaint   2018 10:21 AM CST  Follow up concussion from month ago, hit head again while skiing on Wed was wearing helmet at the time      History of Present Illness   Symptoms from previous concussion had significantly improved but not resolved. Still felt a little off and some headaches. Accidentally took a black kit and fell down hitting head. Did wear a helmet. Happened two days ago. Having headaches for an hour at a time. Hard to read when having the headache.      Review of Systems   Ear/Nose/Mouth/Throat:  No decreased hearing.    Respiratory:  No shortness of breath.    Cardiovascular:  No chest pain.    Gastrointestinal:  No vomiting.    Nausea      Health Status   Allergies:    Allergic Reactions (Selected)  Severity Not Documented  Omnicef (Hives)   Medications:  (Selected)   Prescriptions  Prescribed  cyclobenzaprine 10 mg oral tablet: = 1 tab(s) ( 10 mg ), PO, TID, PRN: for spasm, # 30 tab(s), 0 Refill(s), Type: Maintenance, [facility] [id number], 1 tab(s) Oral tid,PRN:for spasm  Documented Medications  Documented  Sprintec 0.25 mg-35 mcg oral tablet: 1 tab(s), Oral, daily, 0 Refill(s), Type: Maintenance   Problem list:    No problem items selected or recorded.      Histories   Social History: [employer] student. [place]. Working at [employer]. Starts [employer] term class in a week. Living at home for break         Physical Examination   Vital Signs   2018 10:21 AM CST Temperature Tympanic 97.8 DegF   LOW    Peripheral Pulse Rate 76 bpm    Pulse Site Radial artery    HR Method Manual    Systolic Blood Pressure 128 mmHg    Diastolic Blood Pressure 76 mmHg    Mean Arterial Pressure 93 mmHg    BP Site Right arm    BP Method Manual      Measurements from flowsheet : Measurements   12/28/2018 10:21 AM CST Height Measured - Standard 62.75 in    Weight Measured - Standard 153.6 lb    BSA 1.75 m2    Body Mass Index 27.42 kg/m2    Body Mass Index Percentile 89.07      General:  Alert and oriented, No acute distress.    Eye:  Pupils are equal, round and reactive to light, Extraocular movements are intact, Normal conjunctiva.    Neck:  No lymphadenopathy.    Respiratory:  Lungs are clear to auscultation, Respirations are non-labored.    Cardiovascular:  Normal rate, Regular rhythm.    Gastrointestinal:  Soft, Non-tender, Non-distended.    Musculoskeletal:  Normal range of motion, Normal strength, Normal gait, normal cervical ROM, minimal tenderness cervical spine.    Neurologic:  No focal deficits, Cranial Nerves II-XII are grossly intact, Normal deep tendon reflexes.    Psychiatric:  Appropriate mood & affect, Normal judgment.       Review / Management   Word recall: 5 words  Digits Backwards: 6 digits  Double stance: normal  Tandem stance: normal on second try  Single leg stance: put foot down after 3 seconds  Smooth pursuits: little headache  Horizontal saccades: little headache  Vertical saccades: little headache  Gaze stability horizontal: little headache  Gaze stability vertical: little headache  Convergence: little headache    SCAT2: 20 symptoms and 63pts    Discussed and did not feel CT head or cervical spine indicated      Impression and Plan   Diagnosis     Brain concussion (SDG13-SC S06.0X9A).     Discussed concussion and head injury precautions. May use tylenol for headache. Rest as much as possible. Decrease screen time. Attend school as able. Does not feel she needs work restrictions. Follow up in one week and  sooner if worse. Need to be more cautious in activity return given now two concussions in one month and second one before first one fully resolved

## 2022-02-15 NOTE — NURSING NOTE
IV Hydration    Vitals: See intake    Started 1000 cc of NS at 2:15pm for diagnosis of abdominal pain per ZIM  Pt presented back to clinic from Radiology with IV site in place in the left antecubital.    Patient was observed at 2:30pm and 2:45pm. No s/s of phlebitis or infiltration. Patient denies pain.      IV removal    IV stopped at 2:50pm and IV was kept in place. Saline lock applied and arm wrapped in coban. Pt is being transported by family to [place].

## 2022-02-15 NOTE — NURSING NOTE
Comprehensive Intake Entered On:  5/21/2019 3:43 PM CDT    Performed On:  5/21/2019 3:40 PM CDT by Lulu Hernadez CMA               Summary   Chief Complaint :   f/u pituitary MRI done    Menstrual Status :   Menarcheal   Weight Measured :   154 lb(Converted to: 154 lb 0 oz, 69.85 kg)    Systolic Blood Pressure :   130 mmHg   Diastolic Blood Pressure :   72 mmHg   Mean Arterial Pressure :   91 mmHg   Peripheral Pulse Rate :   92 bpm   BP Site :   Right arm   Pulse Site :   Radial artery   BP Method :   Manual   HR Method :   Manual   Lulu Hernadez CMA - 5/21/2019 3:40 PM CDT   Health Status   Allergies Verified? :   Yes   Medication History Verified? :   Yes   Immunizations Current :   No   Pre-Visit Planning Status :   Not completed   Lulu Hernadez CMA - 5/21/2019 3:40 PM CDT   Meds / Allergies   (As Of: 5/21/2019 3:43:04 PM CDT)   Allergies (Active)   Omnicef  Estimated Onset Date:   Unspecified ; Reactions:   Hives ; Created By:   Isabel Estrada CMA; Reaction Status:   Active ; Category:   Drug ; Substance:   Omnicef ; Type:   Allergy ; Updated By:   Isabel Estrada CMA; Reviewed Date:   4/17/2019 12:32 PM CDT        Medication List   (As Of: 5/21/2019 3:43:04 PM CDT)   Prescription/Discharge Order    ondansetron  :   ondansetron ; Status:   Prescribed ; Ordered As Mnemonic:   ondansetron 4 mg oral tablet, disintegrating ; Simple Display Line:   4 mg, 1 tab(s), PO, q8 hrs, 10 tab(s), 0 Refill(s) ; Ordering Provider:   Deepthi Cormier MD; Catalog Code:   ondansetron ; Order Dt/Tm:   4/24/2019 1:11:23 PM            Home Meds    ethinyl estradiol-norgestimate  :   ethinyl estradiol-norgestimate ; Status:   Documented ; Ordered As Mnemonic:   Sprintec 0.25 mg-35 mcg oral tablet ; Simple Display Line:   1 tab(s), Oral, daily, 0 Refill(s) ; Catalog Code:   ethinyl estradiol-norgestimate ; Order Dt/Tm:   9/10/2018 9:34:25 AM

## 2022-02-15 NOTE — PROGRESS NOTES
Chief Complaint    c/o worsening cold sx over the past wk--hurts to take a deep breath in, cough, congestion. also low grade fever last night.  History of Present Illness      Patient is here for cold symptoms.  She states that it hurts to take a deep breath in and has a cough.  Cough is non-productive.  Does c/o having some chest discomfort.  She also has a low grade fever.  Symptoms started Saturday/Sunday.  She does have hx of asthma in childhood but has outgrown sx since.  Review of Systems      Constitutional:  Low grade fever, No chills, No sweats, No weakness, No fatigue.            Eye:  No recent visual problem.            Ear/Nose/Mouth/Throat:  No decreased hearing, Nasal congestion, No sore throat.            Respiratory:  Shortness of breath, Cough, Sputum production.            Cardiovascular:  Negative, No chest pain, No palpitations, No peripheral edema.            Gastrointestinal:  No nausea, No vomiting, No diarrhea, No constipation, No heartburn.            Genitourinary:  No dysuria, No change in urine stream.            Hematology/Lymphatics:  No bruising tendency, No bleeding tendency.            Endocrine:  No cold intolerance, No heat intolerance.            Immunologic:  Negative.            Musculoskeletal:  No back pain, No neck pain, No joint pain, No muscle pain.  Chest pain.          Integumentary:  No rash, No dryness, No skin lesion.            Neurologic:  Alert and oriented X4, No headache.            Psychiatric:  No anxiety, No depression.         Physical Exam   Vitals & Measurements    T: 100.6   F (Tympanic)  HR: 112(Peripheral)  BP: 122/52     HT: 62.5 in  WT: 140.2 lb  BMI: 25.23       General:  Alert and oriented, No acute distress.            Eye:  Pupils are equal, round and reactive to light, Normal conjunctiva.            HENT:  Tympanic membranes are clear, Oral mucosa is moist, No pharyngeal erythema.            Neck:  Supple.            Respiratory:                   Respirations: Are within normal limits.                 Breath sounds: No wheezes present.  Airflow decreased.               Cough: Barking, Non-productive.                 left anterior chest wall tenderness          Cardiovascular:  Normal rate, Regular rhythm, No edema.            Gastrointestinal:  Non-distended.            Musculoskeletal:  Normal gait.           Integumentary:  Warm, No rash.            Psychiatric:  Cooperative, Appropriate mood & affect, Normal judgment.         Assessment/Plan       1. Chest wall pain (R07.89)         Pt will take Ibuprofen for pain relief.       2. Cough (R05)         Will send in a prescription for codeine-base cough syrup that she will take at night.  If fever worsens, cough becomes more productive or worsening body aches, pt will RTC for follow up.      IArmida MA, acted solely as a scribe for, and in the presence of Dr. Steven Tompkins who performed the services.             Steven COBB MD, personally performed the services described in this documentation.  The documentation was scribed in my presence and is both accurate and complete.                Patient Information     Name:OBI BLACKWOOD      Address:      [address] [place] [id number]     Sex:Female     YOB: 2000     [phone]     Emergency Contact:JESUS BLACKWOOD     MRN:[id number]     FIN:[id number]     Location:[facility]     Date of Service:09/19/2018      Primary Care Physician:       NONE ,       Attending Physician:       Steven Tompkins MD, [phone]  Problem List/Past Medical History    Ongoing     No qualifying data    Historical     No qualifying data  Medications     Sprintec 0.25 mg-35 mcg oral tablet: 1 tab(s), Oral, daily, 0 Refill(s).     Zofran 4 mg oral tablet: 4 mg, 1 tab(s), PO, q8 hrs, PRN: nausea, 10 tab(s), 1 Refill(s).     Promethazine with Codeine 6.25 mg-10 mg/5 mL oral syrup:  10 mL, Oral, q4 hrs, 120 mL, 1 Refill(s).          Allergies    Omnicef (Hives)  Social History    Smoking Status - 09/19/2018     Never smoker  Lab Results       Lab Results (Last 4 results within 90 days)        U HCG POC: NEGATIVE [21 mmol/L - 31 mmol/L] (09/11/18 11:44:00 CDT)       Sodium Level: 141 [135 mmol/L - 145 mmol/L] (09/10/18 10:24:00 CDT)       Potassium Level: 4.0 [3.5 mmol/L - 5 mmol/L] (09/10/18 10:24:00 CDT)       Chloride Level: 108 [98 mmol/L - 110 mmol/L] (09/10/18 10:24:00 CDT)       CO2 Level: 25 [21 mmol/L - 31 mmol/L] (09/10/18 10:24:00 CDT)       AGAP: 8 [5  - 18] (09/10/18 10:24:00 CDT)       Glucose Level: 97 [65 mg/dL - 100 mg/dL] (09/10/18 10:24:00 CDT)       BUN: 8 [8 mg/dL - 25 mg/dL] (09/10/18 10:24:00 CDT)       Creatinine Level: 0.87 [0.57 mg/dL - 1.11 mg/dL] (09/10/18 10:24:00 CDT)       BUN/Creat Ratio: 9 Low [10  - 20] (09/10/18 10:24:00 CDT)       eGFR : >60 [10  - 20] (09/10/18 10:24:00 CDT)       eGFR Non-: >60 [10  - 20] (09/10/18 10:24:00 CDT)       Calcium Level: 9.5 [8.5 mg/dL - 10.5 mg/dL] (09/10/18 10:24:00 CDT)       WBC: 8.4 [4.5  - 13] (09/11/18 11:47:00 CDT)       WBC: 6.0 [4.5  - 13] (09/10/18 10:24:00 CDT)       RBC: 4.34 [4.1  - 5.1] (09/11/18 11:47:00 CDT)       RBC: 4.30 [4.1  - 5.1] (09/10/18 10:24:00 CDT)       Hgb: 11.9 Low [12 g/dL - 16 g/dL] (09/11/18 11:47:00 CDT)       Hgb: 11.7 Low [12 g/dL - 16 g/dL] (09/10/18 10:24:00 CDT)       Hct: 35.4 [33 % - 51 %] (09/11/18 11:47:00 CDT)       Hct: 35.2 [33 % - 51 %] (09/10/18 10:24:00 CDT)       MCV: 82 [78 fL - 102 fL] (09/11/18 11:47:00 CDT)       MCV: 82 [78 fL - 102 fL] (09/10/18 10:24:00 CDT)       MCH: 27.4 [25 pg - 35 pg] (09/11/18 11:47:00 CDT)       MCH: 27.2 [25 pg - 35 pg] (09/10/18 10:24:00 CDT)       MCHC: 33.6 [32 g/dL - 36 g/dL] (09/11/18 11:47:00 CDT)       MCHC: 33.2 [32 g/dL - 36 g/dL] (09/10/18 10:24:00 CDT)       RDW: 13.1 [11.5 % - 15.5 %] (09/11/18 11:47:00  CDT)       RDW: 13.0 [11.5 % - 15.5 %] (09/10/18 10:24:00 CDT)       Platelet: 316 [140  - 440] (09/11/18 11:47:00 CDT)       Platelet: 282 [140  - 440] (09/10/18 10:24:00 CDT)       MPV: 10.0 [6.5 fL - 11 fL] (09/11/18 11:47:00 CDT)       MPV: 9.9 [6.5 fL - 11 fL] (09/10/18 10:24:00 CDT)       Lymphocytes: 27.1 [8.5 mg/dL - 10.5 mg/dL] (09/11/18 11:47:00 CDT)       Lymphocytes: 31.2 [8.5 mg/dL - 10.5 mg/dL] (09/10/18 10:24:00 CDT)       Abs Lymphocytes: 2.3 [1.2  - 6.5] (09/11/18 11:47:00 CDT)       Abs Lymphocytes: 1.9 [1.2  - 6.5] (09/10/18 10:24:00 CDT)       Neutrophils: 61.4 [1.2  - 6.5] (09/11/18 11:47:00 CDT)       Neutrophils: 54.0 [1.2  - 6.5] (09/10/18 10:24:00 CDT)       Abs Neutrophils: 5.2 [1.5  - 8] (09/11/18 11:47:00 CDT)       Abs Neutrophils: 3.3 [1.5  - 8] (09/10/18 10:24:00 CDT)       Monocytes: 8.6 [8.5 mg/dL - 10.5 mg/dL] (09/11/18 11:47:00 CDT)       Monocytes: 9.1 [8.5 mg/dL - 10.5 mg/dL] (09/10/18 10:24:00 CDT)       Abs Monocytes: 0.7 [1.2  - 6.5] (09/11/18 11:47:00 CDT)       Abs Monocytes: 0.6 [1.2  - 6.5] (09/10/18 10:24:00 CDT)       Eosinophils: 2.5 [8.5 mg/dL - 10.5 mg/dL] (09/11/18 11:47:00 CDT)       Eosinophils: 5.0 [8.5 mg/dL - 10.5 mg/dL] (09/10/18 10:24:00 CDT)       Abs Eosinophils: 0.2 [1.2  - 6.5] (09/11/18 11:47:00 CDT)       Abs Eosinophils: 0.3 [1.2  - 6.5] (09/10/18 10:24:00 CDT)       Basophils: 0.4 [8.5 mg/dL - 10.5 mg/dL] (09/11/18 11:47:00 CDT)       Basophils: 0.7 [8.5 mg/dL - 10.5 mg/dL] (09/10/18 10:24:00 CDT)       Abs Basophils: 0.0 [1.2  - 6.5] (09/11/18 11:47:00 CDT)       Abs Basophils: 0.0 [1.2  - 6.5] (09/10/18 10:24:00 CDT)       UA pH: 6 [5  - 8] (09/11/18 11:44:00 CDT)       UA Specific Gravity: 1.025 [1.001  - 1.035] (09/11/18 11:44:00 CDT)       UA Glucose: NEGATIVE [0.57 mg/dL - 1.11 mg/dL] (09/11/18 11:44:00 CDT)       UA Bilirubin: NEGATIVE [0.57 mg/dL - 1.11 mg/dL] (09/11/18 11:44:00 CDT)       UA Ketones: NEGATIVE [0.57 mg/dL - 1.11 mg/dL]  (09/11/18 11:44:00 CDT)       Urine Occult Blood: NEGATIVE [0.57 mg/dL - 1.11 mg/dL] (09/11/18 11:44:00 CDT)       UA Protein: NEGATIVE [0.57 mg/dL - 1.11 mg/dL] (09/11/18 11:44:00 CDT)       UA Nitrite: NEGATIVE [0.57 mg/dL - 1.11 mg/dL] (09/11/18 11:44:00 CDT)       UA Leukocyte Esterase: NEGATIVE [0.57 mg/dL - 1.11 mg/dL] (09/11/18 11:44:00 CDT)

## 2022-02-15 NOTE — PROGRESS NOTES
Seen for COVID testing at [facility] per Teo Hernandes PA-C    O2 Sat = 98%  (Children under 12 do not require O2 sat)    Specimen sent to:  [facility]    PUI form faxed to: [facility].

## 2022-02-15 NOTE — PROGRESS NOTES
Chief Complaint    For the past few weeks has vaginal discomfort.  History of Present Illness      Chief complaint as above reviewed and confirmed with patient.  Pt presents to the clinic with concerns re: vaginal irrigation and itching.  mostly external. no significant discharge change . Went to reproductive health and had negative GC/Chlamydia testing.  Has been sexually active in the past about 3 wmonths ago.  Has not missed a period. LMP 10-23-18.  no abdominal or back pain. no fevers or chills.  Does use a body soap to wash and shave.  Review of Systems      Review of systems is negative with the exception of those noted in HPI          Physical Exam   Vitals & Measurements    T: 97.7   F (Tympanic)  HR: 70(Peripheral)  BP: 110/64     HT: 62.75 in  WT: 143 lb  BMI: 25.53       External genitalia that of normal young female.      no genital lesions present      vaginal mucosa is well rugated, small amount of external vulvar erythema.      cervix is closed. no mucopurulent discharge      vaginal discharge: small amount of thick white      adenexa is without masses or tenderness      no CMT present       Wet prep:  negative          Assessment/Plan       1. Vaginitis (N76.0)         likely contact dermatitis/irritant vaginitis.  recommend avoid topical exposures with fregrances or dyes, observation. May use topical hydrocortisone 1 % otc.  observation.  barrier creams discussed.  Pt instructed to return to clinic for persistent or worsening symptoms.                    Orders:          [id number] office outpatient visit 15 minutes (Charge), Quantity: 1, Vaginal discharge  Vaginitis                2. Vaginal discharge (N89.8)         Orders:          [id number] office outpatient visit 15 minutes (Charge), Quantity: 1, Vaginal discharge  Vaginitis          Wet Prep Vaginal (Request), Priority: Urgent, Vaginal discharge                Orders:         ondansetron, = 1 tab(s) ( 4 mg ), PO, q8 hrs, PRN: nausea, # 10 tab(s), 1  Refill(s), Type: Maintenance, Pharmacy: [id number], 1 tab(s) Oral q8 hrs,PRN:nausea, (Completed)  Patient Information     Name:OBI BLACKWOOD      Address:      [address] [address]     Sex:Female     YOB: 2000     [phone]     Emergency Contact:JESUS BLACKWOOD     MRN:[id number]     FIN:[id number]     Location:[facility]     Date of Service:11/09/2018      Primary Care Physician:       NONE ,       Attending Physician:       Bobbi MORGAN, Justina HARRIS, [phone]  Problem List/Past Medical History    Ongoing     No qualifying data    Historical     No qualifying data  Medications     Sprintec 0.25 mg-35 mcg oral tablet: 1 tab(s), Oral, daily, 0 Refill(s).          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 Risks in environment: Unlocked guns, Stairs., 09/24/2018     Nutrition and Health      Type of diet: Regular., 09/24/2018     Sexual      Sexually active: Yes. Identifies as female, Sexual orientation: Straight or heterosexual. Uses condoms: Yes. Contraceptive Use Details: Birth control pill., 09/24/2018     Substance Abuse      Never, 09/24/2018     Tobacco      Rarely, 09/24/2018  Family History    Cancer: Uncle (M).    Depression: Mother.    Diabetes: Grandmother (P).    Hypertension: Grandfather (P).    Migraine: Mother.    Obese: Grandmother (P).  Immunizations      Vaccine Date Status      human papillomavirus vaccine 03/20/2018 Recorded      human papillomavirus vaccine 07/24/2017 Recorded      human papillomavirus vaccine 03/13/2017 Recorded      meningococcal conjugate vaccine 03/13/2017 Recorded      tetanus/diphth/pertuss (Tdap) adult/adol 10/07/2011 Recorded      influenza virus vaccine,  inactivated 12/21/2009 Recorded      influenza virus vaccine, inactivated 10/31/2008 Recorded      varicella 08/29/2008 Recorded      IPV 08/25/2005 Recorded      MMR (measles/mumps/rubella) 08/25/2005 Recorded      DTaP 08/25/2005 Recorded      IPV 05/10/2001 Recorded      DTaP 05/10/2001 Recorded      Hep B 11/21/2000 Recorded      MMR (measles/mumps/rubella) 11/21/2000 Recorded      varicella 11/21/2000 Recorded      Hib 07/21/2000 Recorded      DTaP 07/21/2000 Recorded      IPV 04/10/2000 Recorded      Hep B-Hib 04/10/2000 Recorded      DTaP 04/10/2000 Recorded      IPV 02/14/2000 Recorded      Hep B-Hib 02/14/2000 Recorded      DTaP 02/14/2000 Recorded  Lab Results       Lab Results (Last 4 results within 90 days)        U HCG POC: NEGATIVE [21 mmol/L - 31 mmol/L] (09/11/18 11:44:00 CDT)       Sodium Level: 141 [135 mmol/L - 145 mmol/L] (09/10/18 10:24:00 CDT)       Potassium Level: 4.0 [3.5 mmol/L - 5 mmol/L] (09/10/18 10:24:00 CDT)       Chloride Level: 108 [98 mmol/L - 110 mmol/L] (09/10/18 10:24:00 CDT)       CO2 Level: 25 [21 mmol/L - 31 mmol/L] (09/10/18 10:24:00 CDT)       AGAP: 8 [5  - 18] (09/10/18 10:24:00 CDT)       Glucose Level: 97 [65 mg/dL - 100 mg/dL] (09/10/18 10:24:00 CDT)       BUN: 8 [8 mg/dL - 25 mg/dL] (09/10/18 10:24:00 CDT)       Creatinine Level: 0.87 [0.57 mg/dL - 1.11 mg/dL] (09/10/18 10:24:00 CDT)       BUN/Creat Ratio: 9 Low [10  - 20] (09/10/18 10:24:00 CDT)       eGFR : >60 [10  - 20] (09/10/18 10:24:00 CDT)       eGFR Non-: >60 [10  - 20] (09/10/18 10:24:00 CDT)       Calcium Level: 9.5 [8.5 mg/dL - 10.5 mg/dL] (09/10/18 10:24:00 CDT)       WBC: 8.4 [4.5  - 13] (09/11/18 11:47:00 CDT)       WBC: 6.0 [4.5  - 13] (09/10/18 10:24:00 CDT)       RBC: 4.34 [4.1  - 5.1] (09/11/18 11:47:00 CDT)       RBC: 4.30 [4.1  - 5.1] (09/10/18 10:24:00 CDT)       Hgb: 11.9 Low [12 g/dL - 16 g/dL] (09/11/18 11:47:00 CDT)       Hgb: 11.7 Low [12 g/dL - 16 g/dL]  (09/10/18 10:24:00 CDT)       Hct: 35.4 [33 % - 51 %] (09/11/18 11:47:00 CDT)       Hct: 35.2 [33 % - 51 %] (09/10/18 10:24:00 CDT)       MCV: 82 [78 fL - 102 fL] (09/11/18 11:47:00 CDT)       MCV: 82 [78 fL - 102 fL] (09/10/18 10:24:00 CDT)       MCH: 27.4 [25 pg - 35 pg] (09/11/18 11:47:00 CDT)       MCH: 27.2 [25 pg - 35 pg] (09/10/18 10:24:00 CDT)       MCHC: 33.6 [32 g/dL - 36 g/dL] (09/11/18 11:47:00 CDT)       MCHC: 33.2 [32 g/dL - 36 g/dL] (09/10/18 10:24:00 CDT)       RDW: 13.1 [11.5 % - 15.5 %] (09/11/18 11:47:00 CDT)       RDW: 13.0 [11.5 % - 15.5 %] (09/10/18 10:24:00 CDT)       Platelet: 316 [140  - 440] (09/11/18 11:47:00 CDT)       Platelet: 282 [140  - 440] (09/10/18 10:24:00 CDT)       MPV: 10.0 [6.5 fL - 11 fL] (09/11/18 11:47:00 CDT)       MPV: 9.9 [6.5 fL - 11 fL] (09/10/18 10:24:00 CDT)       Lymphocytes: 27.1 [8.5 mg/dL - 10.5 mg/dL] (09/11/18 11:47:00 CDT)       Lymphocytes: 31.2 [8.5 mg/dL - 10.5 mg/dL] (09/10/18 10:24:00 CDT)       Abs Lymphocytes: 2.3 [1.2  - 6.5] (09/11/18 11:47:00 CDT)       Abs Lymphocytes: 1.9 [1.2  - 6.5] (09/10/18 10:24:00 CDT)       Neutrophils: 61.4 [1.2  - 6.5] (09/11/18 11:47:00 CDT)       Neutrophils: 54.0 [1.2  - 6.5] (09/10/18 10:24:00 CDT)       Abs Neutrophils: 5.2 [1.5  - 8] (09/11/18 11:47:00 CDT)       Abs Neutrophils: 3.3 [1.5  - 8] (09/10/18 10:24:00 CDT)       Monocytes: 8.6 [8.5 mg/dL - 10.5 mg/dL] (09/11/18 11:47:00 CDT)       Monocytes: 9.1 [8.5 mg/dL - 10.5 mg/dL] (09/10/18 10:24:00 CDT)       Abs Monocytes: 0.7 [1.2  - 6.5] (09/11/18 11:47:00 CDT)       Abs Monocytes: 0.6 [1.2  - 6.5] (09/10/18 10:24:00 CDT)       Eosinophils: 2.5 [8.5 mg/dL - 10.5 mg/dL] (09/11/18 11:47:00 CDT)       Eosinophils: 5.0 [8.5 mg/dL - 10.5 mg/dL] (09/10/18 10:24:00 CDT)       Abs Eosinophils: 0.2 [1.2  - 6.5] (09/11/18 11:47:00 CDT)       Abs Eosinophils: 0.3 [1.2  - 6.5] (09/10/18 10:24:00 CDT)       Basophils: 0.4 [8.5 mg/dL - 10.5 mg/dL] (09/11/18 11:47:00 CDT)        Basophils: 0.7 [8.5 mg/dL - 10.5 mg/dL] (09/10/18 10:24:00 CDT)       Abs Basophils: 0.0 [1.2  - 6.5] (09/11/18 11:47:00 CDT)       Abs Basophils: 0.0 [1.2  - 6.5] (09/10/18 10:24:00 CDT)       UA pH: 6 [5  - 8] (09/11/18 11:44:00 CDT)       UA Specific Gravity: 1.025 [1.001  - 1.035] (09/11/18 11:44:00 CDT)       UA Glucose: NEGATIVE [0.57 mg/dL - 1.11 mg/dL] (09/11/18 11:44:00 CDT)       UA Bilirubin: NEGATIVE [0.57 mg/dL - 1.11 mg/dL] (09/11/18 11:44:00 CDT)       UA Ketones: NEGATIVE [0.57 mg/dL - 1.11 mg/dL] (09/11/18 11:44:00 CDT)       Urine Occult Blood: NEGATIVE [0.57 mg/dL - 1.11 mg/dL] (09/11/18 11:44:00 CDT)       UA Protein: NEGATIVE [0.57 mg/dL - 1.11 mg/dL] (09/11/18 11:44:00 CDT)       UA Nitrite: NEGATIVE [0.57 mg/dL - 1.11 mg/dL] (09/11/18 11:44:00 CDT)       UA Leukocyte Esterase: NEGATIVE [0.57 mg/dL - 1.11 mg/dL] (09/11/18 11:44:00 CDT)       Wet Prep Yeast: None Seen [10  - 20] (11/09/18 15:34:00 CST)       Wet Prep Trichomonas: None Seen [21 mmol/L - 31 mmol/L] (11/09/18 15:34:00 CST)       Wet Prep Clue Cells: None Seen [21 mmol/L - 31 mmol/L] (11/09/18 15:34:00 CST)

## 2022-02-15 NOTE — PROGRESS NOTES
Seen for COVID and Strep testing at [facility] per Teo Hernandes PA-C    O2 Sat = 98%  (Children under 12 do not require O2 sat)    Specimen sent to:  [facility]    PUI form faxed to: [facility].

## 2022-02-15 NOTE — PROGRESS NOTES
"   Patient:   OBI BLACKWOOD            MRN: [id number]            FIN: [id number]               Age:   19 years     Sex:  Female     :  2000   Associated Diagnoses:   Acute pharyngitis; Encounter for screening for other viral diseases   Author:   Cecilio MORGAN, Teo MCDANIEL      Visit Information      Date of Service: 2020 10:31 am  Performing Location: [facility]  Encounter#: [id number]      Primary Care Provider (PCP):  NONE ,    Visit type:  Video Visit via D[employer]imMomentCam .    Participants in room during visit:  _1   Location of patient:  _home  Location of provider:  _ (Clinic office   Video Start Time:  _1210  Video End Time:   _1214    Today's visit was conducted via video conference due to the COVID-19 pandemic.  The patient's consent to proceed with a video visit has been obtained and documented.      Chief Complaint   2020 11:54 AM CDT    Verbal consent given for a video visit.  Pt is c/o sore throat,runny nose and headache x 2 days.  Pt states she was \"tested for covid\" yesterday on [place] and \"rapid 15 minute antigen test came back negative but the 3-5 day test is still pending\"        History of Present Illness   Patient is a _19  year old _female who is being evaluated via a billable video visit.  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    Ear/Nose/Mouth/Throat:  Nasal congestion, Sore throat.    Respiratory:  Negative.    Neurologic:  Headache.       Health Status   Allergies:    Allergic Reactions (Selected)  Severity Not Documented  Dust Mite (No reactions were documented)  Omnicef (Hives)   Medications:  (Selected)   Documented Medications  Documented  Sprintec 0.25 mg-35 mcg oral tablet: 1 tab(s), Oral, daily, 0 Refill(s), Type: " Maintenance  levothyroxine 125 mcg (0.125 mg) oral tablet: = 1 tab(s) ( 125 mcg ), Oral, daily, 0 Refill(s), Type: Maintenance   Problem list:    All Problems  Chronic abdominal pain / SNOMED CT 979510905 / Confirmed  Abnormal brain MRI / SNOMED CT 5195625323 / Confirmed  Mild intermittent asthma / SNOMED CT 6663762828 / Confirmed  Hx of cholecystectomy / SNOMED CT 8354422782 / Confirmed  Pituitary adenoma / SNOMED CT 076420848 / Confirmed  Eczema / SNOMED CT 13349807 / Confirmed      Histories   Past Medical History:    No active or resolved past medical history items have been selected or recorded.   Family History:    Hypertension  Grandfather (P)  Migraine  Mother  Depression  Mother  Diabetes  Grandmother (P)  Cancer  Uncle (M)  Obese  Grandmother (P)     Procedure history:    Laparoscopic cholecystectomy (08126765) on 2/21/2018 at 17 Years.  Extraction of wisdom tooth (723041820).   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      Home and Environment Assessment            Marital status: Single.  Risks in environment: Unlocked guns, Stairs.      Nutrition and Health Assessment            Type of diet: Regular.      Sexual Assessment            Sexually active: Yes.  Identifies as female, Sexual orientation: Straight or heterosexual.  Uses condoms:               Yes.  Contraceptive Use Details: Birth control pill.        Physical Examination   General:  No acute distress.    Respiratory:  Respirations are non-labored.    Psychiatric:  Cooperative, Appropriate mood & affect, Normal judgment.       Impression and Plan   Diagnosis     Acute pharyngitis (EUB00-NM J02.9).     Encounter for screening for other viral diseases (VEP16-YJ Z11.59).     Summary:  will order rapid strep, advised to treat sxs  (tylenol/ibuprofen, salt water gargles, throat lozenges),  stay isolated until results of Covid test are available..    Orders     Orders   Lab (Gen Lab  Reference Lab):  POC, GROUP A STREP* ([facility]) (Order): Specimen Type: Swab, Collection Date: 9/9/2020 12:14 PM CDT.     Orders   Charges (Evaluation and Management):  [id number] office outpatient visit 15 minutes (Charge) (Order): Quantity: 1, Acute pharyngitis  Encounter for screening for other viral diseases.

## 2022-02-15 NOTE — PROGRESS NOTES
Patient:   OBI BLACKWOOD            MRN: [id number]            FIN: [id number]               Age:   20 years     Sex:  Female     :  2000   Associated Diagnoses:   Routine screening for STI (sexually transmitted infection); Vaginal discomfort; Yeast vaginitis   Author:   Marika Plaza      Chief Complaint   12/3/2020 12:43 PM CST   vaginal discomfort, started Monday or Tuesday, seems to be fairly constant, denies burning with urination, a little more discharge than normal        History of Present Illness   She has a new sexual partner  onset vaginal itch/discomfort 3 days ago, used a little vagisil externally  some discharge, no urinary symptoms  uses oc, LMP about 1 month ago, she is in placebo week of oc's and expects menses any day  she did use oral prednisone about 2 months ago for asthma flare      Health Status   Allergies:    Allergic Reactions (Selected)  Severity Not Documented  Dust Mite (No reactions were documented)  Omnicef (Hives)   Medications:  (Selected)   Documented Medications  Documented  Sprintec 0.25 mg-35 mcg oral tablet: 1 tab(s), Oral, daily, 0 Refill(s), Type: Maintenance  levothyroxine 125 mcg (0.125 mg) oral tablet: = 1 tab(s) ( 125 mcg ), Oral, daily, 0 Refill(s), Type: Maintenance   Problem list:    All Problems  Chronic abdominal pain / SNOMED CT 282618694 / Confirmed  Eczema / SNOMED CT 64885246 / Confirmed  Hx of cholecystectomy / SNOMED CT 7508094610 / Confirmed  Abnormal brain MRI / SNOMED CT 2321722982 / Confirmed  convex areaon pituitary seen on brain MRI  Mild intermittent asthma / SNOMED CT 7055851813 / Confirmed  Pituitary adenoma / SNOMED CT 993774537 / Confirmed      Histories   Past Medical History:    No active or resolved past medical history items have been selected or recorded.   Family History:    Hypertension  Grandfather (P)  Migraine  Mother  Depression  Mother  Diabetes  Grandmother (P)  Cancer  Uncle (M)  Obese  Grandmother (P)     Procedure history:     Laparoscopic cholecystectomy (SNOMED CT 05854184) performed by Oumar Mahmood MD on 2/21/2018 at 17 Years.  Extraction of wisdom tooth (SNOMED CT 260814784).   Social History:        Electronic Cigarette/Vaping Assessment            Electronic Cigarette Use: has vaped in the past; nothing recently.      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      Home and Environment Assessment            Marital status: Single.  Risks in environment: Unlocked guns, Stairs.      Nutrition and Health Assessment            Type of diet: Regular.      Sexual Assessment            Sexually active: Yes.  Identifies as female, Sexual orientation: Straight or heterosexual.  Uses condoms:               Yes.  Contraceptive Use Details: Birth control pill.        Physical Examination   Vital Signs   12/3/2020 12:43 PM CST Temperature Tympanic 98.2 DegF    Peripheral Pulse Rate 88 bpm    Systolic Blood Pressure 124 mmHg    Diastolic Blood Pressure 60 mmHg    Mean Arterial Pressure 81 mmHg    BP Site Right arm    BP Method Manual    Oxygen Saturation 99 %      Measurements from flowsheet : Measurements   12/3/2020 12:43 PM CST   Weight Measured - Standard                159.9 lb     General:  Alert and oriented, pelvic exam reveals no inguinal lymph nodes palpable, no external lesions, no odor , there is pink chunky white dc in vault, little bleeding at os appears as period onset. , cervix visualized and clear. No cervical motion tenderness, no adnexal tenderness or masses  .       Impression and Plan   Diagnosis     Routine screening for STI (sexually transmitted infection) (PGZ02-IJ Z11.3).     Vaginal discomfort (GBW88-GU N94.9).     Yeast vaginitis (OVI41-WT B37.3).     Plan:  will treat as yeast given prednisone use and clinically  appears as yeast.    Patient Instructions:       Counseled: Patient, Regarding diagnosis, Regarding treatment, Regarding medications, Verbalized understanding.    Orders     Orders (Selected)   Outpatient Orders  Ordered  Wet Prep Vaginal (Request): Priority: Urgent, Vaginal discomfort  Ordered (Dispatched)  Chlamydia/Neisseria gonorrhoeae RNA, TMA* ([facility]): Specimen Type: Swab, Collection Date: 12/03/20 13:10:00 CST  Prescriptions  Prescribed  fluconazole 150 mg oral tablet: = 1 tab(s) ( 150 mg ), Oral, once, Instructions: may repeat dose after 3 days if still symptomatic, # 2 tab(s), 0 Refill(s), Type: Soft Stop, Pharmacy: [id number], 1 tab(s) Oral once,Instr:may repeat dose after 3 days if still symptom....

## 2022-02-15 NOTE — PROGRESS NOTES
Patient:   OBI BLACKWOOD            MRN: [id number]            FIN: [id number]               Age:   18 years     Sex:  Female     :  2000   Associated Diagnoses:   None   Author:   Candy Alcantar CMA       -   Today's date:    10/23/2019.        -   To whom it may concern:        This patient Was seen in my office on  10/23/2019.  .     Please excuse him/ her from work, for the next  1  weeks.  Please contact me if you have any questions or concerns.      -   Sincerely,

## 2022-02-15 NOTE — PROGRESS NOTES
Patient came to clinic today for strep curbside testing per [facility]. O2= 99%. Specimen sent to [facility].    AES

## 2022-02-15 NOTE — TELEPHONE ENCOUNTER
---------------------From: Brittani Hernadez CMA (Phone Messages Pool ([id number]_[facility])) To: [facility] Message Pool (32224_[facility]);   Sent: 5/7/2019 10:32:26 AM CDTSubject: General Message-MRI results Phone MessagePCP:   asked for [facility]  Time of Call:  0927   Person Calling:  selfPhone number:  [phone]:   Pt calling about her MRI results and wanting to know the next step, please adviseLast office visit and reason:  4/29---------------------From: Luisa Beck CMA ([facility] Message Pool (32224_[facility])) To: Deepthi Cormier MD;   Sent: 5/7/2019 10:53:23 AM CDTSubject: FW: General Message-MRI resultsCSS can you help me get a copy of the MRI results?---------------------From: Deepthi Cormier MD To: [facility] Message Pool ([id number]_[facility]);   Sent: 5/7/2019 6:31:44 PM CDTSubject: FW: General Message-MRI resultsPer [facility], patient needs f/u visit to review results and for further possible work up/referral. Patient notified and transferred to [facility].

## 2022-02-15 NOTE — PROGRESS NOTES
Patient:   OBI BLACKWOOD            MRN: [id number]            FIN: [id number]               Age:   20 years     Sex:  Female     :  2000   Associated Diagnoses:   Acute pharyngitis; Exposure to SARS-associated coronavirus   Author:   Cecilio MORGAN, Teo MCDANIEL      Visit Information      Date of Service: 2021 10:42 am  Performing Location: [facility]  Encounter#: [id number]      Primary Care Provider (PCP):  NONE ,    Visit type:  Video Visit via [employer] or [employer].    Participants in room during visit:  _1   Location of patient:  _home  Location of provider:  _ (Clinic office  Video Start Time:  _1106  Video End Time:   _1111    Today's visit was conducted via video conference due to the COVID-19 pandemic.  The patient's consent to proceed with a video visit has been obtained and documented.      Chief Complaint   2021 10:47 AM CST   Verbal consent given for a video visit.   Pt is c/o sore diarrhea and runny nose  x 3 days and sore throat that started last night.      History of Present Illness   Patient is a 20_ year old female_ who is being evaluated via a billable video visit.  sore throat started last night, has had 3 days of diarrhea and runny nose    she had negative Covid test on   no known recent exposure to Covid    her father is immunocompromised and is receiving chemotherapy         Review of Systems   Constitutional:  Negative.    Ear/Nose/Mouth/Throat:  Nasal congestion, Sore throat.    Respiratory:  Negative.    Gastrointestinal:  Diarrhea.       Health Status   Allergies:    Allergic Reactions (Selected)  Severity Not Documented  Dust Mite (No reactions were documented)  Omnicef (Hives)   Medications:  (Selected)   Documented Medications  Documented  Sprintec 0.25 mg-35 mcg oral tablet: 1 tab(s), Oral, daily, 0 Refill(s), Type: Maintenance  levothyroxine 125 mcg (0.125 mg) oral tablet: = 1 tab(s) ( 125 mcg ), Oral, daily, 0 Refill(s), Type: Maintenance   Problem  list:    All Problems  Chronic abdominal pain / SNOMED CT 057015884 / Confirmed  Abnormal brain MRI / SNOMED CT 3514265710 / Confirmed  Mild intermittent asthma / SNOMED CT 3039860579 / Confirmed  Hx of cholecystectomy / SNOMED CT 5350248717 / Confirmed  Pituitary adenoma / SNOMED CT 494338679 / Confirmed  Eczema / SNOMED CT 06008251 / Confirmed      Histories   Past Medical History:    No active or resolved past medical history items have been selected or recorded.   Family History:    Hypertension  Grandfather (P)  Migraine  Mother  Depression  Mother  Diabetes  Grandmother (P)  Cancer  Uncle (M)  Obese  Grandmother (P)     Procedure history:    Laparoscopic cholecystectomy (62115702) on 2/21/2018 at 17 Years.  Extraction of wisdom tooth (513757269).   Social History:        Electronic Cigarette/Vaping Assessment            Electronic Cigarette Use: has vaped in the past; nothing recently.      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      Home and Environment Assessment            Marital status: Single.  Risks in environment: Unlocked guns, Stairs.      Nutrition and Health Assessment            Type of diet: Regular.      Sexual Assessment            Sexually active: Yes.  Identifies as female, Sexual orientation: Straight or heterosexual.  Uses condoms:               Yes.  Contraceptive Use Details: Birth control pill.        Physical Examination   General:  No acute distress.    Respiratory:  Respirations are non-labored.    Psychiatric:  Cooperative, Appropriate mood & affect, Normal judgment.       Impression and Plan   Diagnosis     Acute pharyngitis (CBY36-XG J02.9).     Exposure to SARS-associated coronavirus (CYJ46-SI Z20.828).     Plan:  will test for both strep and Covid (her father is immunocompromised), treat  current sxs, follow up if not improving.    Orders     Orders   Lab (Gen Lab  Reference Lab):  POC, GROUP A STREP* ([facility]) (Order): Specimen Type: Swab, Collection Date: 1/11/2021 11:13 AM CST  Requests (Lab):  SARS-CoV-2 RNA (COVID-19), Qualitative NAAT (Request) (Order): Exposure to SARS-associated coronavirus.     Orders   Charges (Evaluation and Management):  29717 office o/p est low 20-29 min (Charge) (Order): Quantity: 1, Exposure to SARS-associated coronavirus  Acute pharyngitis.

## 2022-02-15 NOTE — NURSING NOTE
PPD Administration POC Entered On:  2/26/2021 2:24 PM CST    Performed On:  2/26/2021 2:23 PM CST by Puja Kaur               PPD Administration   PPD Insertion Site :   Left forearm   PPD Amount Administered (mL) :   0.1 mL   Puja Kaur - 2/26/2021 2:23 PM CST   Details   Collection Date :   2/26/2021 2:18 PM CST   Expiration Date :   9/19/2022 CDT   Lot#/Manufacture :   [id number]    :   sanofi pasteur Harrington , Lauren - 2/26/2021 2:23 PM CST

## 2022-02-15 NOTE — PROGRESS NOTES
Chief Complaint    f/u nausea. Vomiting at least once daily  History of Present Illness         HPI:           Pt present to clinic today with  abdominal pain      no fevers, chills, sore throat, runny nose,   rash or new skin lesions, chest pain, palpitations, slurred speech, new paresthesia, shortness of breath or wheeze.      no recent foreign travel or camping      no known contact with others with similar symptoms                        She reports that from the ages of 12 through 17 she had intermittent abdominal pain and was finally diagnosed with biliary colic and told that she had sludge in her gallbladder.  She underwent a cholecystectomy at the age of 17 and since then has had rare episodes of nausea or pain.  Unfortunately for approximately the past month she has had nausea almost continuously.  It worsens when she eats and is relieved when she vomits however it is relieved for only a short period of time and does not completely resolve and then soon returns to the higher level of nausea.  She also occasionally has episodes of right upper quadrant abdominal pain.  It tends to build up and then reaches a peak over time but then abruptly stops.  It is usually not related to meals and is usually not resolved with defecation however recently she did have one episode of abdominal pain that improved when she had a very loose bowel movement.      She is also been having headaches for the past 10 days and wonders if there is a possibility that a brain tumor could be contributing to her nausea.  She also had an episode that lasted a few hours of having absolutely no sensation in her right arm.      She did not bite her boyfriend into the exam room when we reviewed her x-ray results.           ROS: Negative except as per HPI.                       Exam:           GEN: alert and oriented x 3 in no acute distress           HEENT:           Head: normo-cephalic and atraumatic                       Eyes: PERRL, EOMI,  conjunctiva not injected, no scleral icterus                       Ears:  hearing grossly normal, no otorrhea,                        Nose: nares patent no rhinorrhea                        Mouth: mucous membranes moist and pink,                        NECK:  supple, no anterior cervical or supraclavicular lymphadenopathy, no nuchal rigidity                       CHEST: has bilateral rise with no increased work of breathing. clear to auscultation without wheezes, rhonchi, or rales.                       CARDIOVASCULAR: normal perfusion and brisk capillary refill. S1S2 with regular rate and rhythm and no murmurs, gallops or rubs.                       MUSCULOSKELETAL: no gross focal abnormalities and normal gait.                       Neuro: no gross focal abnormalities and memory seems intact.                       Psychiatric: speech is clear and coherent and fluent. Patient dressed appropriately for the weather. Mood is appropriate and affect is full.           Abdomen:                  Soft,   No rebound or guarding positive right upper quadrant tenderness however negative Campbell sign.       Abdominal two-view x-ray shows quite a bit of gas in the transverse colon otherwise normal.  Physical Exam   Vitals & Measurements    T: 97.4   F (Tympanic)  HR: 83(Peripheral)  BP: 90/64  SpO2: 96%     WT: 157.4 lb   Assessment/Plan       Abdominal pain (R10.9)        I was able to review the case with Dr. Raheem Williamson from [facility].  He has not yet received her referral information however based on her history he let me know that there is unlikely to be a procedure that will help to relieve symptoms however they do have a multispecialty team established to help patients with these chronic symptoms.  Most of the time it is a functional problem but they would certainly rule out the possibility of other abnormalities.  I did share this information with the patient and let her know that seeing anybody  within the [facility] gastroenterology team would be appropriate for her.  25 minutes was spent with patient in direct face-to-face contact of which greater than 50% of the time spent counseling patient and coordinating care.         Ordered:          XR Abdomen Flat and Upright (Request), Priority: STAT, Abdominal pain                Headache (R51)         Ordered:          MRI Brain w/ Contrast (Request), Priority: Routine, Paresthesia  Headache                Nausea (R11.0)                Paresthesia (R20.2)         Ordered:          MRI Brain w/ Contrast (Request), Priority: Routine, Paresthesia  Headache                Orders:         Review Orders for Potential Authorizations, 19 14:28:56 CDT         TSH, 3rd Generation with Reflex to Free T4* ([facility]), Specimen Type: Serum, Collection Date: 19 14:30:00 CDT      spoke with Dr. Williamson, let pt know!  Patient Information     Name:OBI BLACKWOOD      Address:      [address] [place] [id number]     Sex:Female     YOB: 2000     [phone]     Emergency Contact:JESUS BLACKWOOD     MRN:[id number]     FIN:[id number]     Location:[facility]     Date of Service:2019      Primary Care Physician:       NONE ,       Attending Physician:       Casa COLBERT [facility], [phone]  Problem List/Past Medical History    Ongoing     No qualifying data    Historical     No qualifying data  Medications     Sprintec 0.25 mg-35 mcg oral tablet: 1 tab(s), Oral, daily, 0 Refill(s).     ondansetron 4 mg oral tablet, disintegratin mg, 1 tab(s), PO, q8 hrs, 10 tab(s), 0 Refill(s).      Allergies    Omnicef (Hives)  Social History    Smoking Status - 2019     Never smoker     Employment and Education      Part time, Work/School description: Full Time student., 2018     Home and Environment      Marital status: Single. Risks in environment: Unlocked guns,  Stairs., 09/24/2018     Nutrition and Health      Type of diet: Regular., 09/24/2018     Sexual      Sexually active: Yes. Identifies as female, Sexual orientation: Straight or heterosexual. Uses condoms: Yes. Contraceptive Use Details: Birth control pill., 09/24/2018     Substance Abuse      Never, 09/24/2018     Tobacco      Rarely, 09/24/2018  Family History    Cancer: Uncle (M).    Depression: Mother.    Diabetes: Grandmother (P).    Hypertension: Grandfather (P).    Migraine: Mother.    Obese: Grandmother (P).  Immunizations      Vaccine Date Status      human papillomavirus vaccine 03/20/2018 Recorded      human papillomavirus vaccine 07/24/2017 Recorded      meningococcal conjugate vaccine 03/13/2017 Recorded      human papillomavirus vaccine 03/13/2017 Recorded      tetanus/diphth/pertuss (Tdap) adult/adol 10/07/2011 Recorded      influenza virus vaccine, inactivated 12/21/2009 Recorded      influenza virus vaccine, inactivated 10/31/2008 Recorded      varicella 08/29/2008 Recorded      DTaP 08/25/2005 Recorded      MMR (measles/mumps/rubella) 08/25/2005 Recorded      IPV 08/25/2005 Recorded      DTaP 05/10/2001 Recorded      IPV 05/10/2001 Recorded      varicella 11/21/2000 Recorded      Hep B 11/21/2000 Recorded      MMR (measles/mumps/rubella) 11/21/2000 Recorded      DTaP 07/21/2000 Recorded      Hib 07/21/2000 Recorded      DTaP 04/10/2000 Recorded      Hep B-Hib 04/10/2000 Recorded      IPV 04/10/2000 Recorded      DTaP 02/14/2000 Recorded      Hep B-Hib 02/14/2000 Recorded      IPV 02/14/2000 Recorded  Lab Results       Lab Results (Last 4 results within 90 days)        U HCG POC: NEGATIVE [NEGATIVE  - NEGATIVE] (04/17/19 13:02:00)       Sodium Level: 139 mmol/L [135 mmol/L - 146 mmol/L] (04/24/19 13:36:00)       Potassium Level: 4.3 mmol/L [3.8 mmol/L - 5.1 mmol/L] (04/24/19 13:36:00)       Chloride Level: 104 mmol/L [98 mmol/L - 110 mmol/L] (04/24/19 13:36:00)       CO2 Level: 28 mmol/L [20 mmol/L -  32 mmol/L] (04/24/19 13:36:00)       Glucose Level: 99 mg/dL [65 mg/dL - 99 mg/dL] (04/24/19 13:36:00)       BUN: 16 mg/dL [7 mg/dL - 20 mg/dL] (04/24/19 13:36:00)       Creatinine Level: 0.92 mg/dL [0.5 mg/dL - 1 mg/dL] (04/24/19 13:36:00)       BUN/Creat Ratio: NOT APPLICABLE [6  - 22] (04/24/19 13:36:00)       eGFR: 90 mL/min/1.73m2 (04/24/19 13:36:00)       eGFR : 105 mL/min/1.73m2 (04/24/19 13:36:00)       Calcium Level: 9.5 mg/dL [8.9 mg/dL - 10.4 mg/dL] (04/24/19 13:36:00)       Bilirubin Total: 0.4 mg/dL [0.2 mg/dL - 1.1 mg/dL] (04/24/19 13:36:00)       Bilirubin Direct: 0.1 mg/dL (04/24/19 13:36:00)       Bilirubin Indirect: 0.3 [0.2  - 1.1] (04/24/19 13:36:00)       Alkaline Phosphatase: 48 unit/L [47 unit/L - 176 unit/L] (04/24/19 13:36:00)       AST/SGOT: 14 unit/L [12 unit/L - 32 unit/L] (04/24/19 13:36:00)       ALT/SGPT: 15 unit/L [5 unit/L - 32 unit/L] (04/24/19 13:36:00)       Protein Total: 6.9 gm/dL [6.3 gm/dL - 8.2 gm/dL] (04/24/19 13:36:00)       Albumin Level: 4.4 gm/dL [3.6 gm/dL - 5.1 gm/dL] (04/24/19 13:36:00)       Globulin: 2.5 [2  - 3.8] (04/24/19 13:36:00)       A/G Ratio: 1.8 [1  - 2.5] (04/24/19 13:36:00)       Lipase Level: 26 unit/L [7 unit/L - 60 unit/L] (04/24/19 13:36:00)       WBC: 9.1 [3.8  - 10.8] (04/24/19 13:36:00)       RBC: 4.3 [3.8  - 5.1] (04/24/19 13:36:00)       Hgb: 12.3 gm/dL [11.7 gm/dL - 15.5 gm/dL] (04/24/19 13:36:00)       Hct: 36.2 % [35 % - 45 %] (04/24/19 13:36:00)       MCV: 84.2 fL [80 fL - 100 fL] (04/24/19 13:36:00)       MCH: 28.6 pg [27 pg - 33 pg] (04/24/19 13:36:00)       MCHC: 34 gm/dL [32 gm/dL - 36 gm/dL] (04/24/19 13:36:00)       RDW: 12.2 % [11 % - 15 %] (04/24/19 13:36:00)       Platelet: 320 [140  - 400] (04/24/19 13:36:00)       MPV: 10.4 fL [7.5 fL - 12.5 fL] (04/24/19 13:36:00)       UA pH: 5.5 [5  - 8] (04/17/19 13:02:00)       UA Specific Gravity: 1.025 [1.001  - 1.035] (04/17/19 13:02:00)       UA Glucose: NEGATIVE [NEGATIVE   - NEGATIVE] (04/17/19 13:02:00)       UA Bilirubin: NEGATIVE [NEGATIVE  - NEGATIVE] (04/17/19 13:02:00)       UA Ketones: NEGATIVE [NEGATIVE  - NEGATIVE] (04/17/19 13:02:00)       Urine Occult Blood: NEGATIVE [NEGATIVE  - NEGATIVE] (04/17/19 13:02:00)       UA Protein: NEGATIVE [NEGATIVE  - NEGATIVE] (04/17/19 13:02:00)       UA Nitrite: NEGATIVE [NEGATIVE  - NEGATIVE] (04/17/19 13:02:00)       UA Leukocyte Esterase: NEGATIVE [NEGATIVE  - NEGATIVE] (04/17/19 13:02:00)       Culture Urine: See comment (04/17/19 13:15:00)       Wet Prep Yeast: Present (02/28/19 13:22:00)       Wet Prep Trichomonas: None Seen (02/28/19 13:22:00)       Wet Prep Clue Cells: None Seen (02/28/19 13:22:00)  nml abd xrs reviewed with pt

## 2022-02-15 NOTE — PROGRESS NOTES
Patient:   OBI BLACKWOOD            MRN: [id number]            FIN: [id number]               Age:   18 years     Sex:  Female     :  2000   Associated Diagnoses:   Urinary frequency; Vaginal irritation   Author:   Marika Plaza      Visit Information      Referring Provider:  Marika Plaza    NPI# [id number]      Chief Complaint   2019 12:25 PM CDT   c/o ovarian cyst rupture 2 weeks ago, states her urine has been cloudy since. States they did a UA when she was seen and did a culture but believes culture came back normal. was seen in [place].        History of Present Illness      symptoms as confiremed inC with pt  CT scan showed recent ovarian cyst rupture 2 weeks ago, she uses oc's, LMP 3/18  STI testing was done, declines today, same partner  agrees to UPT  Has some pelvic floor irritation but also urinary frequency  wants to make sure not UTI      Review of Systems             Health Status   Allergies:    Allergic Reactions (Selected)  Severity Not Documented  Omnicef (Hives)   Medications:  (Selected)   Documented Medications  Documented  Sprintec 0.25 mg-35 mcg oral tablet: 1 tab(s), Oral, daily, 0 Refill(s), Type: Maintenance   Problem list:    No problem items selected or recorded.      Histories   Past Medical History:    No active or resolved past medical history items have been selected or recorded.   Family History:    Hypertension  Grandfather (P)  Migraine  Mother  Depression  Mother  Diabetes  Grandmother (P)  Cancer  Uncle (M)  Obese  Grandmother (P)     Procedure history:    No active procedure history items have been selected or recorded.   Social History:        Tobacco Assessment            Rarely      Substance Abuse Assessment            Never      Employment and Education Assessment            Part time, Work/School description: Full Time student.      Home and Environment Assessment            Marital status: Single.  Risks in environment: Unlocked guns,  Stairs.      Nutrition and Health Assessment            Type of diet: Regular.      Sexual Assessment            Sexually active: Yes.  Identifies as female, Sexual orientation: Straight or heterosexual.  Uses condoms:               Yes.  Contraceptive Use Details: Birth control pill.      Physical Examination   Vital Signs   4/17/2019 12:25 PM CDT Temperature Tympanic 98.8 DegF    Peripheral Pulse Rate 80 bpm    Pulse Site Radial artery    HR Method Manual    Systolic Blood Pressure 122 mmHg    Diastolic Blood Pressure 60 mmHg    Mean Arterial Pressure 81 mmHg    BP Site Right arm    BP Method Manual      Measurements from flowsheet : Measurements   4/17/2019 12:25 PM CDT Height Measured - Standard 62.75 in    Weight Measured - Standard 160.0 lb    BSA 1.79 m2    Body Mass Index 28.57 kg/m2    Body Mass Index Percentile 91.27      General:  Alert and oriented.    Gastrointestinal:  Soft, Non-tender, Non-distended, No organomegaly.    Genitourinary:  No costovertebral angle tenderness.       Review / Management   Results review:  Lab results   4/17/2019 1:02 PM CDT U HCG POC NEGATIVE    UA pH 5.5    UA Specific Gravity 1.025    UA Glucose NEGATIVE    UA Bilirubin NEGATIVE    UA Ketones NEGATIVE    Urine Occult Blood NEGATIVE    UA Protein NEGATIVE    UA Nitrite NEGATIVE    UA Leukocyte Esterase NEGATIVE   .       Impression and Plan   Diagnosis     Urinary frequency (AOZ67-UK R35.0).     Vaginal irritation (MIC60-IF N89.8).     Plan:  offered pelvic exam but she would like to just be treated for vag yeast infection as she has had those in past and feel like that is what is causing symtpoms  if not improving, will rtc.    Patient Instructions:       Counseled: Patient, Regarding diagnosis, Regarding treatment, Regarding medications, Verbalized understanding, Counseled on symptomatic management. Return to clinic for re evaluation if worsening, simply not improving, or failure to resolve.   .    Orders     Orders  (Selected)   Outpatient Orders  Ordered (In Transit)  Culture, Urine, Routine* ([facility]): Specimen Type: Urine (Clean Catch), Collection Date: 04/17/19 12:55:00 CDT  Prescriptions  Completed  fluconazole 150 mg oral tablet: = 1 tab(s) ( 150 mg ), PO, Once, # 1 EA, 0 Refill(s), Type: Physician Stop, Pharmacy: [id number], 1 tab(s) Oral once.

## 2022-02-15 NOTE — PROGRESS NOTES
Chief Complaint    Patient presents for discharge change and oder x 1 week.  History of Present Illness      Chief complaint as above reviewed and confirmed with patient.  Pt presents to the clinic with concerns re: vaginal odor and irritation, increased discharge. Does not feel like typical yeast infection. Tried antifungal without improvement. She is with a new partner and would like STI screening today.  No dysuria, frequency, urgency, nausea, vomiting or abdominal pain.  Denies interest in HIV/syphilis or hep C testing.  IUD placed in the last 3 months.  Review of Systems      Review of systems is negative with the exception of those noted in HPI          Physical Exam   Vitals & Measurements    T: 98.1  F (Tympanic)  HR: 90 (Peripheral)  BP: 140/68  SpO2: 98%     HT: 63 in  WT: 165.2 lb  BMI: 29.26       nad appears well.       Pt requests self swab for wet prep and defers vaginal exam.       Wet prep with clue cells, otherwise negative      GC /Chlamydia pending.  Assessment/Plan       1. BV (bacterial vaginosis) (N76.0)         metrogel vaginal.  fu for persistent or worsening sx.                2. Screening for STD (sexually transmitted disease) (Z11.3)         will contact pt with results.         Ordered:          Chlamydia/Neisseria gonorrhoeae RNA, TMA* ([facility]), Specimen Type: Urine, Collection Date: 04/07/21 16:24:00 CDT          Wet Prep Vaginal (Request), Priority: STAT, Vaginitis  Screening for STD (sexually transmitted disease)                Orders:         metroNIDAZOLE, = 1 tab(s) ( 500 mg ), Oral, q12 hrs, x 7 day(s), # 14 tab(s), 0 Refill(s), Type: Acute, Pharmacy: [facility] #[id number], 1 tab(s) Oral q12 hrs,x7 day(s), 63, in, 04/07/21 16:08:00 CDT, Height Measured, 165.2, lb, 04/07/21 16:08:00 CDT, Weight M..., (Ordered)         metroNIDAZOLE topical, 1 nickolas, VAG, hs, # 70 gm, 0 Refill(s), Type: Soft Stop, Pharmacy: [facility] #[id number], please omit oral abx previously sent., 1  nickolas Vaginal hs,x5 day(s), 63, in, 04/07/21 16:08:00 CDT, Height Measured, 165.2, lb, 04/07/21 16:08:00 CDT, Weight M..., (Ordered)  Patient Information     Name:OBI BLACKWOOD      Address:      [address] [place] [id number]     Sex:Female     YOB: 2000     [phone]     Emergency Contact:JESUS BLACKWOOD     MRN:[id number]     FIN:[id number]     Location:[facility]     Date of Service:04/07/2021      Primary Care Physician:       NONE ,       Attending Physician:       Bobbi MORGAN, Justina HARRIS, [phone]  Problem List/Past Medical History    Ongoing     Abnormal brain MRI       Comments: convex areaon pituitary seen on brain MRI     Chronic abdominal pain     Eczema     Hx of cholecystectomy     Mild intermittent asthma     Pituitary adenoma    Historical     No qualifying data  Procedure/Surgical History     Laparoscopic cholecystectomy (02/21/2018)           Extraction of wisdom tooth        Medications    iron polysaccharide (as elemental iron) 200 mg oral capsule, 200 mg= 1 cap(s), Oral, daily    levothyroxine 125 mcg (0.125 mg) oral tablet, 125 mcg= 1 tab(s), Oral, daily    metroNIDAZOLE 0.75% vaginal gel with applicator, 1 nickolas, Vaginal, hs    metroNIDAZOLE 500 mg oral tablet, 500 mg= 1 tab(s), Oral, q12 hrs  Allergies    Dust Mite    Omnicef (Hives)  Social History    Smoking Status     Never smoker     Electronic Cigarette/Vaping      Electronic Cigarette Use: has vaped in the past; nothing recently.     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     Home/Environment      Marital status: Single. Risks in environment: Unlocked guns, Stairs.     Nutrition/Health      Type of diet: Regular.     Sexual      Sexually active: Yes. Identifies as female, Sexual orientation: Straight or heterosexual. Uses condoms: Yes. Contraceptive Use Details: Birth control pill.     Substance Abuse      Never      Tobacco      Never (less than 100 in lifetime)  Family History    Cancer: Uncle (M).    Depression: Mother.    Diabetes: Grandmother (P).    Hypertension: Grandfather (P).    Migraine: Mother.    Obese: Grandmother (P).  Immunizations      Vaccine Date Status          influenza virus vaccine, inactivated 02/26/2021 Given          human papillomavirus vaccine 03/20/2018 Recorded          human papillomavirus vaccine 07/24/2017 Recorded          meningococcal conjugate vaccine 03/13/2017 Recorded          human papillomavirus vaccine 03/13/2017 Recorded          tetanus/diphth/pertuss (Tdap) adult/adol 10/07/2011 Recorded          influenza virus vaccine, inactivated 12/21/2009 Recorded          influenza virus vaccine, inactivated 10/31/2008 Recorded          varicella 08/29/2008 Recorded          IPV 08/25/2005 Recorded          MMR (measles/mumps/rubella) 08/25/2005 Recorded          DTaP 08/25/2005 Recorded          IPV 05/10/2001 Recorded          DTaP 05/10/2001 Recorded          varicella 11/21/2000 Recorded          MMR (measles/mumps/rubella) 11/21/2000 Recorded          Hep B 11/21/2000 Recorded          Hib 07/21/2000 Recorded          DTaP 07/21/2000 Recorded          Hib (PRP-T) 07/21/2000 Recorded          IPV 04/10/2000 Recorded          DTaP 04/10/2000 Recorded          Hep B-Hib 04/10/2000 Recorded          IPV 02/14/2000 Recorded          DTaP 02/14/2000 Recorded          Hep B-Hib 02/14/2000 Recorded  Lab Results       Lab Results (Last 4 results within 90 days)        Coronavirus SARS-CoV-2 (COVID-19) TR: Negative (01/12/21 14:30:00)       Group A Strep POC: NOT DETECTED (01/12/21 15:04:00)       Culture Strep A: See comment (01/12/21 15:07:00)       POC Test Comments: Wheal created (03/08/21 14:34:00)       POC Test Comments: Step 2 (03/08/21 14:34:00)

## 2022-02-15 NOTE — LETTER
(Inserted Image. Unable to display)   April 19, 2019      MESERET BLACKWOOD      [address] [place], [place] [id number]        Dear MESERET,    Thank you for selecting [facility] for your healthcare needs.  Below you will find the results of the recent tests done at our clinic.      Your urine culture is negative, Meseret. Let me know if your symptoms don't resolve.      Result Name Current Result   Culture Urine  See comment 4/17/2019       Please contact me or my assistant at [phone] if you have any questions or concerns.     Sincerely,        ESA Monique-NP  Family Nurse Practitioner      What do your labs mean?  Below is a glossary of commonly ordered labs:  LDL   Bad Cholesterol   HDL   Good Cholesterol  AST/ALT   Liver Function   Cr/Creatinine   Kidney Function  Microalbumin   Kidney Function  BUN   Kidney Function  PSA   Prostate    TSH   Thyroid Hormone  HgbA1c   Diabetes Test   Hgb (Hemoglobin)   Red Blood Cells  WBC   White Blood Cell Count

## 2022-02-15 NOTE — PROGRESS NOTES
Patient:   OBI BLACKWOOD            MRN: [id number]            FIN: [id number]               Age:   18 years     Sex:  Female     :  2000   Associated Diagnoses:   Brain concussion   Author:   Steven Malik MD      Visit Information      Date of Service: 2019 12:56 pm  Performing Location: [facility]  Encounter#: [id number]      Primary Care Provider (PCP):  NONE ,       Referring Provider:  Steven Malik MD    NPI# [id number]      Chief Complaint   1/3/2019 12:59 PM CST    Patient is here for f/u on concussion. States small improvement.        History of Present Illness   Symptoms from previous concussion had significantly improved but not resolved. Still felt a little off and some headaches. Accidentally took a black kit and fell down hitting head. Did wear a helmet. Happened last week. Having headaches intermittently for most of the day. Hard to read when having the headache. Feels a little better compared to last visit. Eyes going up triggers the headache the most.      Review of Systems   Ear/Nose/Mouth/Throat:  No decreased hearing.    Respiratory:  No shortness of breath.    Cardiovascular:  No chest pain.    Gastrointestinal:  No vomiting.    Still having some ausea      Health Status   Allergies:    Allergic Reactions (Selected)  Severity Not Documented  Omnicef (Hives)   Medications:  (Selected)   Prescriptions  Prescribed  cyclobenzaprine 10 mg oral tablet: = 1 tab(s) ( 10 mg ), PO, TID, PRN: for spasm, # 30 tab(s), 0 Refill(s), Type: Maintenance, Pharmacy: [facility], 1 tab(s) Oral tid,PRN:for spasm  Documented Medications  Documented  Sprintec 0.25 mg-35 mcg oral tablet: 1 tab(s), Oral, daily, 0 Refill(s), Type: Maintenance   Problem list:    No problem items selected or recorded.      Histories   Social History: [employer] student. [employer]. Working at [employer]. Starts [employer] term class in a week. Living at home for break      Physical  Examination   Vital Signs   1/3/2019 12:59 PM CST Temperature Tympanic 98.5 DegF    Peripheral Pulse Rate 92 bpm    Pulse Site Radial artery    HR Method Manual    Systolic Blood Pressure 124 mmHg    Diastolic Blood Pressure 78 mmHg    Mean Arterial Pressure 93 mmHg    BP Site Right arm    BP Method Manual      Measurements from flowsheet : Measurements   1/3/2019 12:59 PM CST    Weight Measured - Standard                151.4 lb     General:  Alert and oriented, No acute distress.    Eye:  Extraocular movements are intact, Normal conjunctiva.    Neck:  No lymphadenopathy.    Respiratory:  Lungs are clear to auscultation, Respirations are non-labored.    Cardiovascular:  Normal rate, Regular rhythm.    Gastrointestinal:  Soft, Non-tender, Non-distended.    Musculoskeletal:  Normal range of motion, Normal strength, Normal gait, normal cervical ROM, minimal tenderness cervical spine.    Neurologic:  No focal deficits, Cranial Nerves II-XII are grossly intact.    Psychiatric:  Appropriate mood & affect, Normal judgment.       Review / Management   Word recall: 5 words  Digits Backwards: 6 digits  Double stance: normal  Tandem stance: normal  Single leg stance: put foot down after 10 seconds  Smooth pursuits: little headache  Horizontal saccades: little headache  Vertical saccades: little headache  Gaze stability horizontal: little headache  Gaze stability vertical: little headache  Convergence: little headache    SCAT2: 21 symptoms and 58pts (1/3/2019). 20 symptoms and 63pts (12/28/2018)    Discussed and did not feel CT head or cervical spine indicated      Impression and Plan   Diagnosis     Brain concussion (WCT02-IS S06.0X9A).     Post concussion syndrome. May use tylenol for headache. Rest as much as possible. Decrease screen time. Attend school as able. Does not feel she needs work restrictions and able to work the past week. Follow up in 1-2 weeks and sooner if worse. Need to be more cautious in activity return  given now two concussions in one month and second one before first one fully resolved

## 2022-02-15 NOTE — TELEPHONE ENCOUNTER
---------------------  From: Kristopher Jaquez   To: Cecilio MORGAN, Teo MCDANIEL;     Sent: 12/31/2020 3:21:59 PM CST  Subject: Covid results     Called pt about her negative COVID results. Pt feels about the same. Told pt to stay quarantined until symptom free for at least 72 hours. Pt understood and had no questions at this time.

## 2022-02-15 NOTE — LETTER
(Inserted Image. Unable to display)   December 04, 2020      MESERET BLACKWOOD      [address] [place] [phone]        Dear MESERET,    Thank you for selecting [facility] for your healthcare needs.  Below you will find the results of the recent tests done at our clinic.      The lab confirmed yeast, Meseret. Other tests are pending, thank you.      Result Name Current Result Previous Result   Wet Prep Yeast  Present 12/3/2020  Present 2/28/2019   Wet Prep Trichomonas  None Seen 12/3/2020  None Seen 2/28/2019   Wet Prep Clue Cells  None Seen 12/3/2020  None Seen 2/28/2019       Please contact me or my assistant at [phone] if you have any questions or concerns.     Sincerely,        ESA Monique-NP  Family Nurse Practitioner      What do your labs mean?  Below is a glossary of commonly ordered labs:  LDL   Bad Cholesterol   HDL   Good Cholesterol  AST/ALT   Liver Function   Cr/Creatinine   Kidney Function  Microalbumin   Kidney Function  BUN   Kidney Function  PSA   Prostate    TSH   Thyroid Hormone  HgbA1c   Diabetes Test   Hgb (Hemoglobin)   Red Blood Cells  WBC   White Blood Cell Count

## 2022-02-15 NOTE — TELEPHONE ENCOUNTER
Order and referral form faxed to [facility] in [place] per patients request. She is aware they will contact her to schedule.

## 2022-02-15 NOTE — NURSING NOTE
Comprehensive Intake Entered On:  1/11/2021 10:50 AM CST    Performed On:  1/11/2021 10:47 AM CST by Aníbal Luna CMA               Summary   Chief Complaint :   Verbal consent given for a video visit.   Pt is c/o sore diarrhea and runny nose  x 3 days and sore throat that started last night.   Menstrual Status :   Menarcheal   Aníbal Luna CMA - 1/11/2021 10:47 AM CST   Health Status   Allergies Verified? :   Yes   Medication History Verified? :   Yes   Immunizations Current :   No   Medical History Verified? :   Yes   Pre-Visit Planning Status :   Not completed   Tobacco Use? :   Never smoker   Aníbal Luna CMA - 1/11/2021 10:47 AM CST   Meds / Allergies   (As Of: 1/11/2021 10:50:51 AM CST)   Allergies (Active)   Dust Mite  Estimated Onset Date:   Unspecified ; Created By:   Celsa Duff; Reaction Status:   Active ; Category:   Drug ; Substance:   Dust Mite ; Type:   Allergy ; Updated By:   Celsa Duff; Reviewed Date:   1/11/2021 10:50 AM CST      Omnicef  Estimated Onset Date:   Unspecified ; Reactions:   Hives ; Created By:   Isabel Estrada CMA; Reaction Status:   Active ; Category:   Drug ; Substance:   Omnicef ; Type:   Allergy ; Updated By:   Isabel Estrada CMA; Reviewed Date:   1/11/2021 10:50 AM CST        Medication List   (As Of: 1/11/2021 10:50:51 AM CST)   Home Meds    levothyroxine  :   levothyroxine ; Status:   Documented ; Ordered As Mnemonic:   levothyroxine 125 mcg (0.125 mg) oral tablet ; Simple Display Line:   125 mcg, 1 tab(s), Oral, daily, 0 Refill(s) ; Catalog Code:   levothyroxine ; Order Dt/Tm:   9/9/2020 11:58:22 AM CDT          ethinyl estradiol-norgestimate  :   ethinyl estradiol-norgestimate ; Status:   Documented ; Ordered As Mnemonic:   Sprintec 0.25 mg-35 mcg oral tablet ; Simple Display Line:   1 tab(s), Oral, daily, 0 Refill(s) ; Catalog Code:   ethinyl estradiol-norgestimate ; Order Dt/Tm:   9/10/2018 9:34:25 AM CDT            ID Risk Screen    Recent Travel History :   No recent travel   Family Member Travel History :   No recent travel   Other Exposure to Infectious Disease :   Unknown   Anbíal Luna CMA - 1/11/2021 10:47 AM CST   Social History   Social History   (As Of: 1/11/2021 10:50:51 AM CST)   Tobacco:        Never (less than 100 in lifetime)   (Last Updated: 12/3/2020 12:46:29 PM CST by Morenita Sood LPN)          Electronic Cigarette/Vaping:        Electronic Cigarette Use: has vaped in the past; nothing recently.   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   (Last Updated: 9/9/2020 12:02:48 PM CDT by Aníbal Luna CMA)          Home/Environment:        Marital status: Single.  Risks in environment: Unlocked guns, Stairs.   (Last Updated: 9/24/2018 10:31:55 AM CDT by Kaitlyn Sweet)          Nutrition/Health:        Type of diet: Regular.   (Last Updated: 9/24/2018 10:32:00 AM CDT by Kaitlyn Sewet)          Sexual:        Sexually active: Yes.  Identifies as female, Sexual orientation: Straight or heterosexual.  Uses condoms: Yes.  Contraceptive Use Details: Birth control pill.   (Last Updated: 9/24/2018 10:32:14 AM CDT by Kaitlyn Sweet)

## 2022-02-15 NOTE — TELEPHONE ENCOUNTER
---------------------  From: Aníbal Luna CMA ([facility] Message Pool ([id number]_[facility]))   To: Appointment Pool ([id number]_[place] - [place]);     Sent: 9/9/2020 12:19:48 PM CDT  Subject: General Message     Please put pt on drive up testing for a STREP ONLY per [facility].  Thank you Aníbal XIAO

## 2022-02-15 NOTE — PROGRESS NOTES
Chief Complaint    f/u pituitary MRI done  History of Present Illness      patient present to clinic today for follow up.  She had a pituitary MRI done that does show an fact that she has an 8 mm after pituitary adenoma.  She has already scheduled an appointment with the [facility] pituitary clinic.  She reports that she has headaches daily.  They occur on her bilateral parietal head.  She thinks that her nausea occurs when her headaches are more severe now.  This is different than her previous abdominal pain.  We discussed that given her physical findings of some tenderness at her bilateral occipital head that perhaps there is an element of occipital neuralgia.  We could try doing occipital nerve blocks diagnostically to see if this improves her headache.  If it did then I would not recommend that she needs to have a neurology consult.  However, if they provided no benefit then would be more concerned about the possibility of this pituitary adenoma possibly contributing to her headaches or her headaches being caused by some other problem and would suggest that she make an appointment with neurology in addition to the pituitary clinic with [place].  We discussed risks of pain bleeding infection injury to surrounding tissue.  She decided she would like to try the occipital nerve blocks.      Review of systems is negative except as per HPI including:  no fevers, chills, sore throat, runny nose, nausea, vomiting, constipation, diarrhea, rash or new skin lesions, chest pain, palpitations, slurred speech, new paresthesia, shortness of breath or wheeze.      Exam:      General: alert and oriented ×3 no acute distress.      HEENT: pupils are equal round and reactive to light extraocular motion is intact. Normocephalic and atraumatic.       Hearing is grossly normal and there is no otorrhea.       Nares are patent there is no rhinorrhea.       Mucous membranes are moist and pink.      Chest: has bilateral rise with no  increased work of breathing.      Cardiovascular: normal perfusion and brisk capillary refill.      Musculoskeletal: no gross focal abnormalities and normal gait.      Neuro: no gross focal abnormalities and memory seems intact.      Psychiatric: speech is clear and coherent and fluent. Patient dressed appropriately for the weather. Mood is appropriate and affect is full.       Procedure Note: Right Occipital nerve block      Informed consent obtained including risks of pain, bleeding, infection, injury to surrounding tissue and need for  further treatment, benefit hopefully reduce pain, no guarantee made, alternatives, doing nothing, trying oral medications such as NSAIDS, TCAs, gabapentin, using ice, trying PT, yoga, ashley chi, referral to another provider.       Location: painful and tender occipital head trigger points overlying greater occipital nerve were identified by palpation with communication from patient             Number of injections:1             pain prior to procedure:   moderate  to severe             pain following procedure: Unchanged             number of milliliters of the 1:1 solution of 1% lidocaine without epinephrine and 0.5% Marcaine without epinephrine used in total: 3              Discussed with patient to return to clinic if symptoms worsen or do not improve  Physical Exam   Vitals & Measurements    HR: 92(Peripheral)  BP: 130/72     WT: 154 lb   Assessment/Plan       Chronic headaches (R51)         Ordered:          29156 office outpatient visit 15 minutes (Charge), Quantity: 1, Pituitary adenoma  Chronic headaches          Referral (Request), 05/21/19 16:07:00 CDT, Referred to: Neurology, Chronic headaches          Referral (Request), 05/21/19 16:07:00 CDT, Referred to: Endocrinology, Chronic headaches  Pituitary adenoma                Pituitary adenoma (D35.2)        Together we decided that patient would go ahead and get some labs drawn so that she can try to get the results back  to put with her for her follow-up with the pituitary adenoma clinic with male.  We will also make a appointment with a neurologist with male.  She will let me know if her symptoms worsen or do not improve in the meantime.  15 minutes was spent with patient in direct face-to-face contact in addition to the time spent performing the procedure today of which greater than 50% of that time spent counseling and coordinating care.         Ordered:          19194 office outpatient visit 15 minutes (Charge), Quantity: 1, Pituitary adenoma  Chronic headaches          Referral (Request), 05/21/19 16:07:00 CDT, Referred to: Endocrinology, Chronic headaches  Pituitary adenoma                Orders:         Acth, plasma* ([facility]), Specimen Type: Plasma, Collection Date: 05/21/19 16:10:00 CDT         Cortisol, total* ([facility]), Specimen Type: Serum, Collection Date: 05/21/19 16:10:00 CDT         Fsh and lh* ([facility]), Specimen Type: Serum, Collection Date: 05/21/19 16:10:00 CDT         Growth hormone (gh)* ([facility]), Specimen Type: Serum, Collection Date: 05/21/19 16:10:00 CDT         Prolactin* ([facility]), Specimen Type: Serum, Collection Date: 05/21/19 16:10:00 CDT         T3, free* ([facility]), Specimen Type: Serum, Collection Date: 05/21/19 16:10:00 CDT         T4, free* ([facility]), Specimen Type: Serum, Collection Date: 05/21/19 16:10:00 CDT         TSH* ([facility]), Specimen Type: Serum, Collection Date: 05/21/19 16:10:00 CDT  Patient Information     Name:OBI BLACKWOOD      Address:      [address] [place] [id number]     Sex:Female     YOB: 2000     [phone]     Emergency Contact:JESUS BLACKWOOD     MRN:[id number]     FIN:[id number]     Location:[facility]     Date of Service:05/21/2019      Primary Care Physician:       NONE ,       Attending Physician:       Casa COLBERT, Deepthi, [phone]  Problem List/Past Medical History    Ongoing     Abnormal brain MRI       Comments: convex  areaon pituitary seen on brain MRI     Chronic abdominal pain     Eczema     Hx of cholecystectomy     Mild intermittent asthma     Pituitary adenoma    Historical     No qualifying data  Procedure/Surgical History     Laparoscopic cholecystectomy (2018)           Extraction of wisdom tooth        Medications     Sprintec 0.25 mg-35 mcg oral tablet: 1 tab(s), Oral, daily, 0 Refill(s).     ondansetron 4 mg oral tablet, disintegratin mg, 1 tab(s), PO, q8 hrs, 10 tab(s), 0 Refill(s).      Allergies    Omnicef (Hives)  Social History    Smoking Status - 05/10/2019     Never smoker     Employment and Education      Part time, Work/School description: Full Time student., 2018     Home and Environment      Marital status: Single. Risks in environment: Unlocked guns, Stairs., 2018     Nutrition and Health      Type of diet: Regular., 2018     Sexual      Sexually active: Yes. Identifies as female, Sexual orientation: Straight or heterosexual. Uses condoms: Yes. Contraceptive Use Details: Birth control pill., 2018     Substance Abuse      Never, 2018     Tobacco      Rarely, 2018  Family History    Cancer: Uncle (M).    Depression: Mother.    Diabetes: Grandmother (P).    Hypertension: Grandfather (P).    Migraine: Mother.    Obese: Grandmother (P).  Immunizations      Vaccine Date Status      human papillomavirus vaccine 2018 Recorded      human papillomavirus vaccine 2017 Recorded      meningococcal conjugate vaccine 2017 Recorded      human papillomavirus vaccine 2017 Recorded      tetanus/diphth/pertuss (Tdap) adult/adol 10/07/2011 Recorded      influenza virus vaccine, inactivated 2009 Recorded      influenza virus vaccine, inactivated 10/31/2008 Recorded      varicella 2008 Recorded      DTaP 2005 Recorded      MMR (measles/mumps/rubella) 2005 Recorded      IPV 2005 Recorded      DTaP 05/10/2001 Recorded      IPV  05/10/2001 Recorded      varicella 11/21/2000 Recorded      Hep B 11/21/2000 Recorded      MMR (measles/mumps/rubella) 11/21/2000 Recorded      DTaP 07/21/2000 Recorded      Hib 07/21/2000 Recorded      DTaP 04/10/2000 Recorded      Hep B-Hib 04/10/2000 Recorded      IPV 04/10/2000 Recorded      DTaP 02/14/2000 Recorded      Hep B-Hib 02/14/2000 Recorded      IPV 02/14/2000 Recorded  Lab Results       Lab Results (Last 4 results within 90 days)        U HCG POC: NEGATIVE [NEGATIVE  - NEGATIVE] (04/17/19 13:02:00)       Sodium Level: 139 mmol/L [135 mmol/L - 146 mmol/L] (04/24/19 13:36:00)       Potassium Level: 4.3 mmol/L [3.8 mmol/L - 5.1 mmol/L] (04/24/19 13:36:00)       Chloride Level: 104 mmol/L [98 mmol/L - 110 mmol/L] (04/24/19 13:36:00)       CO2 Level: 28 mmol/L [20 mmol/L - 32 mmol/L] (04/24/19 13:36:00)       Glucose Level: 99 mg/dL [65 mg/dL - 99 mg/dL] (04/24/19 13:36:00)       BUN: 16 mg/dL [7 mg/dL - 20 mg/dL] (04/24/19 13:36:00)       Creatinine Level: 0.92 mg/dL [0.5 mg/dL - 1 mg/dL] (04/24/19 13:36:00)       BUN/Creat Ratio: NOT APPLICABLE [6  - 22] (04/24/19 13:36:00)       eGFR: 90 mL/min/1.73m2 (04/24/19 13:36:00)       eGFR : 105 mL/min/1.73m2 (04/24/19 13:36:00)       Calcium Level: 9.5 mg/dL [8.9 mg/dL - 10.4 mg/dL] (04/24/19 13:36:00)       Bilirubin Total: 0.4 mg/dL [0.2 mg/dL - 1.1 mg/dL] (04/24/19 13:36:00)       Bilirubin Direct: 0.1 mg/dL (04/24/19 13:36:00)       Bilirubin Indirect: 0.3 [0.2  - 1.1] (04/24/19 13:36:00)       Alkaline Phosphatase: 48 unit/L [47 unit/L - 176 unit/L] (04/24/19 13:36:00)       AST/SGOT: 14 unit/L [12 unit/L - 32 unit/L] (04/24/19 13:36:00)       ALT/SGPT: 15 unit/L [5 unit/L - 32 unit/L] (04/24/19 13:36:00)       Protein Total: 6.9 gm/dL [6.3 gm/dL - 8.2 gm/dL] (04/24/19 13:36:00)       Albumin Level: 4.4 gm/dL [3.6 gm/dL - 5.1 gm/dL] (04/24/19 13:36:00)       Globulin: 2.5 [2  - 3.8] (04/24/19 13:36:00)       A/G Ratio: 1.8 [1  - 2.5]  (04/24/19 13:36:00)       Lipase Level: 26 unit/L [7 unit/L - 60 unit/L] (04/24/19 13:36:00)       WBC: 9.1 [3.8  - 10.8] (04/24/19 13:36:00)       RBC: 4.3 [3.8  - 5.1] (04/24/19 13:36:00)       Hgb: 12.3 gm/dL [11.7 gm/dL - 15.5 gm/dL] (04/24/19 13:36:00)       Hct: 36.2 % [35 % - 45 %] (04/24/19 13:36:00)       MCV: 84.2 fL [80 fL - 100 fL] (04/24/19 13:36:00)       MCH: 28.6 pg [27 pg - 33 pg] (04/24/19 13:36:00)       MCHC: 34 gm/dL [32 gm/dL - 36 gm/dL] (04/24/19 13:36:00)       RDW: 12.2 % [11 % - 15 %] (04/24/19 13:36:00)       Platelet: 320 [140  - 400] (04/24/19 13:36:00)       MPV: 10.4 fL [7.5 fL - 12.5 fL] (04/24/19 13:36:00)       UA pH: 5.5 [5  - 8] (04/17/19 13:02:00)       UA Specific Gravity: 1.025 [1.001  - 1.035] (04/17/19 13:02:00)       UA Glucose: NEGATIVE [NEGATIVE  - NEGATIVE] (04/17/19 13:02:00)       UA Bilirubin: NEGATIVE [NEGATIVE  - NEGATIVE] (04/17/19 13:02:00)       UA Ketones: NEGATIVE [NEGATIVE  - NEGATIVE] (04/17/19 13:02:00)       Urine Occult Blood: NEGATIVE [NEGATIVE  - NEGATIVE] (04/17/19 13:02:00)       UA Protein: NEGATIVE [NEGATIVE  - NEGATIVE] (04/17/19 13:02:00)       UA Nitrite: NEGATIVE [NEGATIVE  - NEGATIVE] (04/17/19 13:02:00)       UA Leukocyte Esterase: NEGATIVE [NEGATIVE  - NEGATIVE] (04/17/19 13:02:00)       Culture Urine: See comment (04/17/19 13:15:00)       Wet Prep Yeast: Present (02/28/19 13:22:00)       Wet Prep Trichomonas: None Seen (02/28/19 13:22:00)       Wet Prep Clue Cells: None Seen (02/28/19 13:22:00)

## 2022-02-15 NOTE — PROGRESS NOTES
Patient:   OBI LBACKWOOD            MRN: [id number]            FIN: [id number]               Age:   17 years     Sex:  Female     :  2000   Associated Diagnoses:   School problem   Author:   Christian Sewell MD      Chief Complaint   2018 10:02 AM CDT   Here today to discuss ADD medications.  Has struggled for a long time but never tested or started on medication because did OK in school.      History of Present Illness    The patient is an 17-year-old college freshman here to discuss ADD.  The patient did well in high school where she describes having 35 on her ACT score.  However, she always thought she had a little trouble paying attention but she could overcome it with support and technique.  She has now started college and says she is having trouble.  She works mostly on the weekends about 20 hours per week.  Patient admits she is sort of a night owl and has been going to bed later but still has to get up early so has been a little tired.  She is taking mostly science related classes and enjoys it.  She has minimal alcohol use.  She does not engage in any regular exercise.           Review of Systems   Constitutional:  No fever, No chills.    Eye   Ear/Nose/Mouth/Throat:  No nasal congestion, No sore throat.    Respiratory:  No shortness of breath, No cough.    Cardiovascular   Breast   Gastrointestinal:  No nausea, No vomiting, No diarrhea, No constipation.    Genitourinary:  No dysuria.    Gynecologic   Hematology/Lymphatics:  No bruising tendency, No swollen lymph glands.    Endocrine   Immunologic:  No recurrent fevers, No recurrent infections.    Musculoskeletal:  No muscle pain.    Integumentary:  No rash.    Neurologic:  No tingling, No headache.    Psychiatric   All other systems.     Health Status   Allergies:    Allergic Reactions (Selected)  Severity Not Documented  Omnicef (Hives)   Medications:  (Selected)   Prescriptions  Prescribed  Zofran 4 mg oral tablet: = 1 tab(s) ( 4 mg ), PO,  q8 hrs, PRN: nausea, # 10 tab(s), 1 Refill(s), Type: Maintenance, Pharmacy: [id number], 1 tab(s) Oral q8 hrs,PRN:nausea  Documented Medications  Documented  Sprintec 0.25 mg-35 mcg oral tablet: 1 tab(s), Oral, daily, 0 Refill(s), Type: Maintenance,    Medications          *denotes recorded medication          *Sprintec 0.25 mg-35 mcg oral tablet: 1 tab(s), Oral, daily, 0 Refill(s).          Zofran 4 mg oral tablet: 4 mg, 1 tab(s), PO, q8 hrs, PRN: nausea, 10 tab(s), 1 Refill(s).        Histories   Past Medical History:    No active or resolved past medical history items have been selected or recorded.   Family History:    Hypertension  Grandfather (P)  Migraine  Mother  Depression  Mother  Diabetes  Grandmother (P)  Cancer  Uncle (M)  Obese  Grandmother (P)     Procedure history:    No active procedure history items have been selected or recorded.   Social History:        Tobacco Assessment            Rarely      Substance Abuse Assessment            Never      Employment and Education Assessment            Part time, Work/School description: Full Time student.      Home and Environment Assessment            Marital status: Single.  Risks in environment: Unlocked guns, Stairs.      Nutrition and Health Assessment            Type of diet: Regular.      Sexual Assessment            Sexually active: Yes.  Identifies as female, Sexual orientation: Straight or heterosexual.  Uses condoms:               Yes.  Contraceptive Use Details: Birth control pill.        Physical Examination   Last Menstrual Period: 10/23/2018   Vital Signs   11/1/2018 10:02 AM CDT Temperature Temporal 97.7 DegF    Peripheral Pulse Rate 60 bpm    Systolic Blood Pressure 110 mmHg    Diastolic Blood Pressure 64 mmHg    Mean Arterial Pressure 79 mmHg      Measurements from flowsheet : Measurements   11/1/2018 10:02 AM CDT Height Measured - Standard 62.75 in    Weight Measured - Standard 144.75 lb    BSA 1.7 m2    Body Mass Index 25.84  kg/m2    Body Mass Index Percentile 84.10      General:  Alert and oriented, No acute distress.    Eye:  Pupils are equal, round and reactive to light, Normal conjunctiva.    HENT:  Oral mucosa is moist.    Neck:  Supple.    Respiratory:  Lungs are clear to auscultation, Respirations are non-labored.    Cardiovascular:  Normal rate, Regular rhythm, No edema.    Gastrointestinal:  Non-distended.    Musculoskeletal:  Normal gait.    Integumentary:  Warm, No rash.    Psychiatric:  Cooperative, Appropriate mood & affect, Normal judgment.       Review / Management   Results review:  Lab results   9/11/2018 11:47 AM CDT WBC 8.4    RBC 4.34    Hgb 11.9 g/dL    Hct 35.4 %    MCV 82 fL    MCH 27.4 pg    MCHC 33.6 g/dL    RDW 13.1 %    Platelet 316    MPV 10.0 fL    Lymphs 27.1 %    Abs Lymphs 2.3    Neutrophils 61.4 %    Abs Neutrophils 5.2    Monocytes 8.6 %    Abs Monocytes 0.7    Eosinophils 2.5 %    Abs Eosinophils 0.2    Basophils 0.4 %    Abs Basophils 0.0   9/11/2018 11:44 AM CDT U HCG POC NEGATIVE    UA pH 6.0    UA Specific Gravity 1.025    UA Glucose NEGATIVE    UA Bilirubin NEGATIVE    UA Ketones NEGATIVE    U Occult Blood NEGATIVE    UA Protein NEGATIVE    UA Nitrite NEGATIVE    UA Leuk Est NEGATIVE   9/10/2018 10:24 AM CDT Sodium Level 141 mmol/L    Potassium Level 4.0 mmol/L    Chloride Level 108 mmol/L    CO2 Level 25 mmol/L    AGAP 8    Glucose Level 97 mg/dL    BUN 8 mg/dL    Creatinine 0.87 mg/dL    BUN/Creat Ratio 9    eGFR  >60    eGFR Non-African American >60    Calcium Level 9.5 mg/dL    WBC 6.0    RBC 4.30    Hgb 11.7 g/dL    Hct 35.2 %    MCV 82 fL    MCH 27.2 pg    MCHC 33.2 g/dL    RDW 13.0 %    Platelet 282    MPV 9.9 fL    Lymphs 31.2 %    Abs Lymphs 1.9    Neutrophils 54.0 %    Abs Neutrophils 3.3    Monocytes 9.1 %    Abs Monocytes 0.6    Eosinophils 5.0 %    Abs Eosinophils 0.3    Basophils 0.7 %    Abs Basophils 0.0   .       Impression and Plan   Diagnosis     School problem  ([id number]-CV Z55.9).      Young patient who might have some mild ADD symptoms but I explained with other complicating factors such as her probable mild sleep deprivation and just the adjustment period to college.  I would not recommend she go on a trial of stimulant medication but instead engage in counseling services at the University which help with studying techniques.  She should make sure she gets plenty of sleep and start a program of three or four times a week of physical activity and set some realistic goals and then see how she does over the next couple of months.  I have discussed ADD and what to watch for in general.

## 2022-02-15 NOTE — PROGRESS NOTES
Chief Complaint    feels naseous after eating present for a few weeks.  History of Present Illness        HPI:           Pt present to clinic today with  abdominal pain      no fevers, chills, sore throat, runny nose,   rash or new skin lesions, chest pain, palpitations, slurred speech, new paresthesia, shortness of breath or wheeze.      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                       Exam:           GEN: alert and oriented x 3 in no acute distress           HEENT:           Head: normo-cephalic and atraumatic                       Eyes: PERRL, EOMI, conjunctiva not injected, no scleral icterus                       Ears:  hearing grossly normal, no otorrhea,                        Nose: nares patent no rhinorrhea                        Mouth: mucous membranes moist and pink,                        NECK:  supple, no anterior cervical or supraclavicular lymphadenopathy, no nuchal rigidity                       CHEST: has bilateral rise with no increased work of breathing. clear to auscultation without wheezes, rhonchi, or rales.                       CARDIOVASCULAR: normal perfusion and brisk capillary refill. S1S2 with regular rate and rhythm and no murmurs, gallops or rubs.                       MUSCULOSKELETAL: no gross focal abnormalities and normal gait.                       Neuro: no gross focal abnormalities and memory seems intact.                       Psychiatric: speech is clear and coherent and fluent. Patient dressed appropriately for the weather. Mood is appropriate and affect is full.           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          Ordered:          ondansetron, = 1 tab(s) ( 4 mg ), PO, q8 hrs, # 10 tab(s), 0 Refill(s), Type: Maintenance, Pharmacy: [facility] [id number], 1 tab(s) Oral q8 hrs, (Ordered)          Basic Metabolic Panel* ([facility]), Specimen Type: Serum, Collection Date: 04/24/19 13:11:00 CDT          CBC (h/h, RBC, indices, WBC, Plt)* ([facility]), Specimen Type: Blood, Collection Date: 04/24/19 13:11:00 CDT          Hepatic Function Panel* ([facility]), Specimen Type: Serum, Collection Date: 04/24/19 13:11:00 CDT          Lipase* ([facility]), Specimen Type: Serum, Collection Date: 04/24/19 13:11:00 CDT          Referral (Request), 04/24/19 12:55:00 CDT, Referred to: Gastroenterology, Referred to: Raheem Williamson, Abdominal pain  Spasm, sphincter of Oddi                Nausea (R11.0)         Ordered:          ondansetron, = 1 tab(s) ( 4 mg ), PO, q8 hrs, # 10 tab(s), 0 Refill(s), Type: Maintenance, Pharmacy: [facility] [id number], 1 tab(s) Oral q8 hrs, (Ordered)          Basic Metabolic Panel* ([facility]), Specimen Type: Serum, Collection Date: 04/24/19 13:11:00 CDT          CBC (h/h, RBC, indices, WBC, Plt)* ([facility]), Specimen Type: Blood, Collection Date: 04/24/19 13:11:00 CDT          Hepatic Function Panel* ([facility]), Specimen Type: Serum, Collection Date: 04/24/19 13:11:00 CDT          Lipase* ([facility]), Specimen Type: Serum, Collection Date: 04/24/19 13:11:00 CDT                Spasm, 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   Patient Information     Name:OBI BLACKWOOD      Address:      [address] [address]     Sex:Female     YOB: 2000     [phone]     Emergency Contact:JESUS BLACKWOOD     MRN:[id number]     FIN:[id number]     Location:[facility]     Date of Service:2019      Primary Care Physician:       NONE ,       Attending Physician:       [facility], [phone]  Problem List/Past Medical History    Ongoing     No qualifying data    Historical     No qualifying data  Medications     Sprintec 0.25 mg-35 mcg oral tablet: 1 tab(s), Oral, daily, 0 Refill(s).     ondansetron 4 mg oral tablet, disintegratin mg, 1 tab(s), PO, q8 hrs, 10 tab(s), 0 Refill(s).          Allergies    Omnicef (Hives)  Social History    Smoking Status - 2019     Never smoker     Employment and Education      Part time, Work/School description: Full Time student., 2018     Home and Environment      Marital status: Single. Risks in environment: Unlocked guns, Stairs., 2018     Nutrition and Health      Type of diet: Regular., 2018     Sexual      Sexually active: Yes. Identifies as female, Sexual orientation: Straight or heterosexual. Uses condoms: Yes. Contraceptive Use Details: Birth control pill., 2018     Substance Abuse      Never, 2018     Tobacco      Rarely, 2018  Family History    Cancer: Uncle (M).    Depression: Mother.    Diabetes: Grandmother (P).    Hypertension: Grandfather (P).    Migraine: Mother.    Obese: Grandmother (P).  Immunizations      Vaccine Date Status      human papillomavirus vaccine 2018 Recorded      human  papillomavirus vaccine 07/24/2017 Recorded      meningococcal conjugate vaccine 03/13/2017 Recorded      human papillomavirus vaccine 03/13/2017 Recorded      tetanus/diphth/pertuss (Tdap) adult/adol 10/07/2011 Recorded      influenza virus vaccine, inactivated 12/21/2009 Recorded      influenza virus vaccine, inactivated 10/31/2008 Recorded      varicella 08/29/2008 Recorded      DTaP 08/25/2005 Recorded      MMR (measles/mumps/rubella) 08/25/2005 Recorded      IPV 08/25/2005 Recorded      DTaP 05/10/2001 Recorded      IPV 05/10/2001 Recorded      varicella 11/21/2000 Recorded      Hep B 11/21/2000 Recorded      MMR (measles/mumps/rubella) 11/21/2000 Recorded      DTaP 07/21/2000 Recorded      Hib 07/21/2000 Recorded      DTaP 04/10/2000 Recorded      Hep B-Hib 04/10/2000 Recorded      IPV 04/10/2000 Recorded      DTaP 02/14/2000 Recorded      Hep B-Hib 02/14/2000 Recorded      IPV 02/14/2000 Recorded  Lab Results       Lab Results (Last 4 results within 90 days)        U HCG POC: NEGATIVE [NEGATIVE  - NEGATIVE] (04/17/19 13:02:00)       Sodium Level: 139 mmol/L [135 mmol/L - 146 mmol/L] (04/24/19 13:36:00)       Potassium Level: 4.3 mmol/L [3.8 mmol/L - 5.1 mmol/L] (04/24/19 13:36:00)       Chloride Level: 104 mmol/L [98 mmol/L - 110 mmol/L] (04/24/19 13:36:00)       CO2 Level: 28 mmol/L [20 mmol/L - 32 mmol/L] (04/24/19 13:36:00)       Glucose Level: 99 mg/dL [65 mg/dL - 99 mg/dL] (04/24/19 13:36:00)       BUN: 16 mg/dL [7 mg/dL - 20 mg/dL] (04/24/19 13:36:00)       Creatinine Level: 0.92 mg/dL [0.5 mg/dL - 1 mg/dL] (04/24/19 13:36:00)       BUN/Creat Ratio: NOT APPLICABLE [6  - 22] (04/24/19 13:36:00)       eGFR: 90 mL/min/1.73m2 (04/24/19 13:36:00)       eGFR : 105 mL/min/1.73m2 (04/24/19 13:36:00)       Calcium Level: 9.5 mg/dL [8.9 mg/dL - 10.4 mg/dL] (04/24/19 13:36:00)       Bilirubin Total: 0.4 mg/dL [0.2 mg/dL - 1.1 mg/dL] (04/24/19 13:36:00)       Bilirubin Direct: 0.1 mg/dL (04/24/19  13:36:00)       Bilirubin Indirect: 0.3 [0.2  - 1.1] (04/24/19 13:36:00)       Alkaline Phosphatase: 48 unit/L [47 unit/L - 176 unit/L] (04/24/19 13:36:00)       AST/SGOT: 14 unit/L [12 unit/L - 32 unit/L] (04/24/19 13:36:00)       ALT/SGPT: 15 unit/L [5 unit/L - 32 unit/L] (04/24/19 13:36:00)       Protein Total: 6.9 gm/dL [6.3 gm/dL - 8.2 gm/dL] (04/24/19 13:36:00)       Albumin Level: 4.4 gm/dL [3.6 gm/dL - 5.1 gm/dL] (04/24/19 13:36:00)       Globulin: 2.5 [2  - 3.8] (04/24/19 13:36:00)       A/G Ratio: 1.8 [1  - 2.5] (04/24/19 13:36:00)       Lipase Level: 26 unit/L [7 unit/L - 60 unit/L] (04/24/19 13:36:00)       WBC: 9.1 [3.8  - 10.8] (04/24/19 13:36:00)       RBC: 4.3 [3.8  - 5.1] (04/24/19 13:36:00)       Hgb: 12.3 gm/dL [11.7 gm/dL - 15.5 gm/dL] (04/24/19 13:36:00)       Hct: 36.2 % [35 % - 45 %] (04/24/19 13:36:00)       MCV: 84.2 fL [80 fL - 100 fL] (04/24/19 13:36:00)       MCH: 28.6 pg [27 pg - 33 pg] (04/24/19 13:36:00)       MCHC: 34 gm/dL [32 gm/dL - 36 gm/dL] (04/24/19 13:36:00)       RDW: 12.2 % [11 % - 15 %] (04/24/19 13:36:00)       Platelet: 320 [140  - 400] (04/24/19 13:36:00)       MPV: 10.4 fL [7.5 fL - 12.5 fL] (04/24/19 13:36:00)       UA pH: 5.5 [5  - 8] (04/17/19 13:02:00)       UA Specific Gravity: 1.025 [1.001  - 1.035] (04/17/19 13:02:00)       UA Glucose: NEGATIVE [NEGATIVE  - NEGATIVE] (04/17/19 13:02:00)       UA Bilirubin: NEGATIVE [NEGATIVE  - NEGATIVE] (04/17/19 13:02:00)       UA Ketones: NEGATIVE [NEGATIVE  - NEGATIVE] (04/17/19 13:02:00)       Urine Occult Blood: NEGATIVE [NEGATIVE  - NEGATIVE] (04/17/19 13:02:00)       UA Protein: NEGATIVE [NEGATIVE  - NEGATIVE] (04/17/19 13:02:00)       UA Nitrite: NEGATIVE [NEGATIVE  - NEGATIVE] (04/17/19 13:02:00)       UA Leukocyte Esterase: NEGATIVE [NEGATIVE  - NEGATIVE] (04/17/19 13:02:00)       Culture Urine: See comment (04/17/19 13:15:00)       Wet Prep Yeast: Present (02/28/19 13:22:00)       Wet Prep Trichomonas: None Seen  (02/28/19 13:22:00)       Wet Prep Clue Cells: None Seen (02/28/19 13:22:00)

## 2022-02-15 NOTE — PROGRESS NOTES
Patient:   OBI BLACKWOOD            MRN: [id number]            FIN: [id number]               Age:   18 years     Sex:  Female     :  2000   Associated Diagnoses:   Brain concussion   Author:   Steven Malik MD      Visit Information      Date of Service: 2019 12:58 pm  Performing Location: [facility]  Encounter#: [id number]      Primary Care Provider (PCP):  NONE ,       Referring Provider:  Steven Malik MD    NPI# [id number]      Chief Complaint   2019 1:01 PM CST    Follow up concussion, has improved in the last 5 days        History of Present Illness   Has neck pain that feels better with the flexeril. If misses the flexeril for a few days then headaches increased. Symptoms from previous concussion had significantly improved but not resolved. Still felt a little off and some headaches. Accidentally took a black kit and fell down hitting head day after . Did wear a helmet. Having headaches intermittently for most of the day but no longer severe. Usually has mild headache each day. Tylenol helps. Now able to read and take her class over [employer] term. Went out over the weekend playing arcade games and did not trigger symptoms. Eyes going up triggers the headache the most. Feels 70% improved.      Review of Systems   Ear/Nose/Mouth/Throat:  No decreased hearing.    Respiratory:  No shortness of breath.    Still having some nausea  Vomited once reading a week ago      Health Status   Allergies:    Allergic Reactions (Selected)  Severity Not Documented  Omnicef (Hives)   Medications:  (Selected)   Prescriptions  Prescribed  cyclobenzaprine 10 mg oral tablet: = 1 tab(s) ( 10 mg ), PO, TID, PRN: for spasm, # 30 tab(s), 0 Refill(s), Type: Maintenance, Pharmacy: [facility] [id number], 1 tab(s) Oral tid,PRN:for spasm  Documented Medications  Documented  Sprintec 0.25 mg-35 mcg oral tablet: 1 tab(s), Oral, daily, 0 Refill(s), Type: Maintenance   Problem list:    No  problem items selected or recorded.      Histories   Social History: [employer] student. [place]. Working at [employer]. Starts [employer] term class in a week. Living at home for break      Physical Examination   Vital Signs   1/14/2019 1:01 PM CST Temperature Tympanic 98.5 DegF    Peripheral Pulse Rate 74 bpm    Pulse Site Radial artery    HR Method Manual    Systolic Blood Pressure 118 mmHg    Diastolic Blood Pressure 68 mmHg    Mean Arterial Pressure 85 mmHg    BP Site Right arm    BP Method Manual      Measurements from flowsheet : Measurements   1/14/2019 1:01 PM CST Height Measured - Standard 62.75 in    Weight Measured - Standard 152 lb    BSA 1.75 m2    Body Mass Index 27.14 kg/m2    Body Mass Index Percentile 88.31      General:  Alert and oriented, No acute distress.    Eye:  Extraocular movements are intact, Normal conjunctiva.    Neck:  No lymphadenopathy.    Respiratory:  Lungs are clear to auscultation, Respirations are non-labored.    Cardiovascular:  Normal rate, Regular rhythm.    Gastrointestinal:  Soft, Non-tender, Non-distended.    Musculoskeletal:  Normal range of motion, Normal strength, Normal gait, normal cervical ROM.    Neurologic:  No focal deficits, Cranial Nerves II-XII are grossly intact.    Psychiatric:  Appropriate mood & affect, Normal judgment.       Review / Management   Word recall: 5 words  Digits Backwards: 6 digits  Double stance: normal  Tandem stance: normal  Single leg stance: put foot down after 15 seconds  Smooth pursuits: minimal headache  Horizontal saccades: minimal headache  Vertical saccades: minimal headache  Gaze stability horizontal: minimal headache  Gaze stability vertical: minimal headache  Convergence: minimal headache    SCAT2: 18 symptoms and 35pts (1/14/2019). 21 symptoms and 58pts (1/3/2019). 20 symptoms and 63pts (12/28/2018)      Impression and Plan   Diagnosis     Brain concussion ([id number] S06.0X9A).     Post concussion syndrome. May use tylenol for  headache. Rest as much as possible. Decrease screen time. Continue to attend school as able. Does not feel she needs work restrictions and able to work the past week. Follow up in 2 weeks and sooner if worse. Need to be more cautious in activity return given now two concussions in one month and second one before first one fully resolved. Refilled flexeril.

## 2022-02-15 NOTE — NURSING NOTE
Comprehensive Intake Entered On:  4/7/2021 4:13 PM CDT    Performed On:  4/7/2021 4:08 PM CDT by Isabel Estrada CMA               Summary   Chief Complaint :   Patient presents for discharge change and oder x 1 week.    Last Menstrual Period :   4/5/2021 CDT   Menstrual Status :   Menarcheal   Weight Measured :   165.2 lb(Converted to: 165 lb 3 oz, 74.933 kg)    Height Measured :   63 in(Converted to: 5 ft 3 in, 160.02 cm)    Body Mass Index :   29.26 kg/m2 (HI)    Body Surface Area :   1.82 m2   Systolic Blood Pressure :   140 mmHg (HI)    Diastolic Blood Pressure :   68 mmHg   Mean Arterial Pressure :   92 mmHg   Peripheral Pulse Rate :   90 bpm   BP Site :   Right arm   BP Method :   Manual   HR Method :   Electronic   Temperature Tympanic :   98.1 DegF(Converted to: 36.7 DegC)    Oxygen Saturation :   98 %   Isabel Estrada CMA - 4/7/2021 4:08 PM CDT   Health Status   Allergies Verified? :   Yes   Medication History Verified? :   Yes   Immunizations Current :   No   Tobacco Use? :   Never smoker   Isabel Estrada CMA - 4/7/2021 4:08 PM CDT   Consents   Consent for Immunization Exchange :   Consent Granted   Consent for Immunizations to Providers :   Consent Granted   Isabel Estrada CMA - 4/7/2021 4:08 PM CDT   Meds / Allergies   (As Of: 4/7/2021 4:13:08 PM CDT)   Allergies (Active)   Dust Mite  Estimated Onset Date:   Unspecified ; Created By:   Celsa Duff; Reaction Status:   Active ; Category:   Drug ; Substance:   Dust Mite ; Type:   Allergy ; Updated By:   Celsa Duff; Reviewed Date:   4/7/2021 4:10 PM CDT      Omnicef  Estimated Onset Date:   Unspecified ; Reactions:   Hives ; Created By:   Isabel Estrada CMA; Reaction Status:   Active ; Category:   Drug ; Substance:   Omnicef ; Type:   Allergy ; Updated By:   Isabel Estrada CMA; Reviewed Date:   4/7/2021 4:10 PM CDT        Medication List   (As Of: 4/7/2021 4:13:08 PM CDT)   Home Meds    iron polysaccharide  :   iron  polysaccharide ; Status:   Documented ; Ordered As Mnemonic:   iron polysaccharide (as elemental iron) 200 mg oral capsule ; Simple Display Line:   200 mg, 1 cap(s), Oral, daily, 0 Refill(s) ; Catalog Code:   iron polysaccharide ; Order Dt/Tm:   4/7/2021 4:11:08 PM CDT          levothyroxine  :   levothyroxine ; Status:   Documented ; Ordered As Mnemonic:   levothyroxine 125 mcg (0.125 mg) oral tablet ; Simple Display Line:   125 mcg, 1 tab(s), Oral, daily, 0 Refill(s) ; Catalog Code:   levothyroxine ; Order Dt/Tm:   9/9/2020 11:58:22 AM CDT            ID Risk Screen   Recent Travel History :   No recent travel   Family Member Travel History :   No recent travel   Other Exposure to Infectious Disease :   Unknown   COVID-19 Testing Status :   No positive COVID-19 test   Isabel Estrada CMA - 4/7/2021 4:08 PM CDT

## 2022-02-15 NOTE — LETTER
(Inserted Image. Unable to display)       May 28, 2019      OBI BLACKWOOD  [address] [id number]        Dear OBI,     Thank you for selecting [facility] for your healthcare needs. Below you will find the results of your recent test(s) done at our clinic.      Your hormone results are not quite normal.  I'm glad you are seeing the pituitary clinic!  This is a copy for your records.      Result Name Current Result Reference Range   T4 Free (ng/dL)  1.0 5/21/2019 0.8 - 1.4   T3 Free (pg/mL) ((L)) 2.5 5/21/2019 3.0 - 4.7   TSH (mIU/L) ((H)) 53.01 5/21/2019    Adrenocorticotropic Hormone (ACTH) (pg/mL) ((H)) 51 5/21/2019 6 - 50   Cortisol (mcg/dL) ((H)) 32.5 5/21/2019    FSH (mIU/mL)  5.5 5/21/2019    Growth Hormone (ng/mL)  1.5 5/21/2019  - < OR = 10.1   Luteinizing Hormone (LH) (mIU/mL)  5.6 5/21/2019    Prolactin Level (ng/mL)  22.4 5/21/2019      Please contact my practice at [phone] if you have any questions or concerns.     Sincerely,        Deepthi Cormier MD      What do your labs mean?  Below is a glossary of commonly ordered labs:  LDL   Bad Cholesterol   HDL   Good Cholesterol  AST/ALT   Liver Function   Cr/Creatinine   Kidney Function  Microalbumin   Kidney Function  BUN   Kidney Function  PSA   Prostate    TSH   Thyroid Hormone  HgbA1c   Diabetes Test   Hgb (Hemoglobin)   Red Blood Cells

## 2022-02-15 NOTE — NURSING NOTE
Comprehensive Intake Entered On:  11/12/2019 1:50 PM CST    Performed On:  11/12/2019 1:45 PM CST by Alia Martin LPN               Summary   Chief Complaint :   Vaginal pain and itching for the past week.    Menstrual Status :   Menarcheal   Weight Measured :   151 lb(Converted to: 151 lb 0 oz, 68.49 kg)    Height Measured :   62.75 in(Converted to: 5 ft 3 in, 159.38 cm)    Body Mass Index :   26.96 kg/m2   Body Surface Area :   1.74 m2   Systolic Blood Pressure :   110 mmHg   Diastolic Blood Pressure :   62 mmHg   Mean Arterial Pressure :   78 mmHg   Peripheral Pulse Rate :   74 bpm   BP Site :   Right arm   Pulse Site :   Radial artery   BP Method :   Manual   HR Method :   Manual   Temperature Tympanic :   98.2 DegF(Converted to: 36.8 DegC)    Alia Martin LPN - 11/12/2019 1:45 PM CST   Health Status   Allergies Verified? :   Yes   Medication History Verified? :   Yes   Alia Martin LPN - 11/12/2019 1:45 PM CST   Consents   Consent for Immunization Exchange :   Consent Granted   Consent for Immunizations to Providers :   Consent Granted   Alia Martin LPN - 11/12/2019 1:45 PM CST   Meds / Allergies   (As Of: 11/12/2019 1:50:02 PM CST)   Allergies (Active)   Dust Mite  Estimated Onset Date:   Unspecified ; Created By:   Celsa Duff; Reaction Status:   Active ; Category:   Drug ; Substance:   Dust Mite ; Type:   Allergy ; Updated By:   Celsa Duff; Reviewed Date:   11/12/2019 1:48 PM CST      Omnicef  Estimated Onset Date:   Unspecified ; Reactions:   Hives ; Created By:   Isabel Estrada CMA; Reaction Status:   Active ; Category:   Drug ; Substance:   Omnicef ; Type:   Allergy ; Updated By:   Isabel Estrada CMA; Reviewed Date:   11/12/2019 1:48 PM CST        Medication List   (As Of: 11/12/2019 1:50:02 PM CST)   Prescription/Discharge Order    ondansetron  :   ondansetron ; Status:   Prescribed ; Ordered As Mnemonic:   ondansetron 4 mg oral tablet,  disintegrating ; Simple Display Line:   4 mg, 1 tab(s), Oral, q8 hrs, 10 tab(s), 1 Refill(s) ; Ordering Provider:   Deepthi Cormier MD; Catalog Code:   ondansetron ; Order Dt/Tm:   10/10/2019 2:29:21 PM CDT            Home Meds    fluticasone-salmeterol  :   fluticasone-salmeterol ; Status:   Documented ; Ordered As Mnemonic:   Advair HFA 45 mcg-21 mcg/inh inhalation aerosol ; Simple Display Line:   2 puff(s), Inhale, bid, 0 Refill(s) ; Catalog Code:   fluticasone-salmeterol ; Order Dt/Tm:   10/23/2019 11:24:30 AM CDT          loratadine  :   loratadine ; Status:   Documented ; Ordered As Mnemonic:   Claritin 10 mg oral tablet ; Simple Display Line:   See Instructions, Up to 4 times daily., 0 Refill(s) ; Catalog Code:   loratadine ; Order Dt/Tm:   10/10/2019 1:02:26 PM CDT          levothyroxine  :   levothyroxine ; Status:   Documented ; Ordered As Mnemonic:   levothyroxine 75 mcg (0.075 mg) oral tablet ; Simple Display Line:   75 mcg, 1 tab(s), Oral, daily, 0 Refill(s) ; Catalog Code:   levothyroxine ; Order Dt/Tm:   10/10/2019 1:02:01 PM CDT          ethinyl estradiol-norgestimate  :   ethinyl estradiol-norgestimate ; Status:   Documented ; Ordered As Mnemonic:   Sprintec 0.25 mg-35 mcg oral tablet ; Simple Display Line:   1 tab(s), Oral, daily, 0 Refill(s) ; Catalog Code:   ethinyl estradiol-norgestimate ; Order Dt/Tm:   9/10/2018 9:34:25 AM CDT

## 2022-02-15 NOTE — NURSING NOTE
PPD Reading POC Entered On:  3/11/2021 10:25 AM CST    Performed On:  3/11/2021 10:25 AM CST by Lori Philip RN               PPD Reading   PPD mm of Induration :   0 mm   PPD Interpretation :   Negative   PPD Completion Status :   Completed   Lori Philip RN - 3/11/2021 10:25 AM CST

## 2022-02-15 NOTE — PROGRESS NOTES
Patient:   OBI BLACKWOOD            MRN: [id number]            FIN: [id number]               Age:   18 years     Sex:  Female     :  2000   Associated Diagnoses:   Head injury; Neck strain   Author:   Teo Hernandes PA-C      Chief Complaint   2018 8:19 AM CST    c/o falling out of her lofted bed early this morning and hitting head on floor, possibly hit head on dresser also,  having headache, nausea      History of Present Illness   Chief complaint and symptoms noted above and confirmed with patient   as above,  she probably hit the dresser on the way down scraping her right side of neck and causing a nose bleed with small skin break along edge of right nare  nausea but no emesis  possibly had LOC for about a minute    SCAT2:   total sxs, 73/132 sx severity score      Review of Systems   Gastrointestinal:  Nausea, No vomiting.       Health Status   Allergies:    Allergic Reactions (Selected)  Severity Not Documented  Omnicef (Hives)   Medications:  (Selected)   Documented Medications  Documented  Sprintec 0.25 mg-35 mcg oral tablet: 1 tab(s), Oral, daily, 0 Refill(s), Type: Maintenance,    Medications          *denotes recorded medication          *Sprintec 0.25 mg-35 mcg oral tablet: 1 tab(s), Oral, daily, 0 Refill(s).        Histories   Past Medical History:    No active or resolved past medical history items have been selected or recorded.   Family History:    Hypertension  Grandfather (P)  Migraine  Mother  Depression  Mother  Diabetes  Grandmother (P)  Cancer  Uncle (M)  Obese  Grandmother (P)     Procedure history:    No active procedure history items have been selected or recorded.   Social History:        Tobacco Assessment            Rarely      Substance Abuse Assessment            Never      Employment and Education Assessment            Part time, Work/School description: Full Time student.      Home and Environment Assessment            Marital status: Single.  Risks in environment:  Unlocked guns, Stairs.      Nutrition and Health Assessment            Type of diet: Regular.      Sexual Assessment            Sexually active: Yes.  Identifies as female, Sexual orientation: Straight or heterosexual.  Uses condoms:               Yes.  Contraceptive Use Details: Birth control pill.      Physical Examination   Vital Signs   12/1/2018 8:19 AM CST Peripheral Pulse Rate 78 bpm    Pulse Site Radial artery    HR Method Manual    Systolic Blood Pressure 140 mmHg  HI    Diastolic Blood Pressure 70 mmHg    Mean Arterial Pressure 93 mmHg    BP Site Right arm    BP Method Manual      Measurements from flowsheet : Measurements   12/1/2018 8:19 AM CST Height Measured - Standard 62.75 in    Weight Measured - Standard 147.2 lb    BSA 1.72 m2    Body Mass Index 26.28 kg/m2    Body Mass Index Percentile 85.67      General:  Mild distress.    Eye:  Pupils are equal, round and reactive to light, Extraocular movements are intact.    HENT:  Tympanic membranes are clear, No pharyngeal erythema, No sinus tenderness, skin break at edge of right nare, no active bleeding.    Neck:  Supple, No lymphadenopathy, scrape along right side of neck,  tender over right trapezius,  some tenderness over cervical spine.    Respiratory:  Lungs are clear to auscultation.    Cardiovascular:  Normal rate, Regular rhythm, No murmur.    Neurologic:  Cranial Nerves II-XII are grossly intact, good heel to toe walking, Romberg is negative.    Cognition and Speech:  Speech clear and coherent.    Psychiatric:  Appropriate mood & affect.       Review / Management   Radiology results   Results reviewed with patient.  Xray shows no fractures or dislocations.  Will be over-read by radiologist.  Will call if over-read has additional information.      Impression and Plan   Diagnosis     Head injury ([id number] S09.90XA).     Neck strain ([id number] S16.1XXA).     Summary:  will use flexeril for the muscle strain, use bacitracin for skin tear at edge of  nose, rest, limit screen time  contact student health nurse if accomodations are needed  follow up if not improving.    Orders     Orders   Requests (Radiology):  XR Cervical Spine 3 Views (Request) (Order): Neck strain  Head injury.     Orders   Pharmacy:  cyclobenzaprine 10 mg oral tablet (Prescribe): = 1 tab(s) ( 10 mg ), PO, TID, PRN: for spasm, # 30 tab(s), 0 Refill(s), Type: Maintenance, Pharmacy: [id number], 1 tab(s) Oral tid,PRN:for spasm.     Orders   Charges (Evaluation and Management):  [id number] office outpatient visit 15 minutes (Charge) (Order): Quantity: 1, Head injury  Neck strain.

## 2022-02-15 NOTE — TELEPHONE ENCOUNTER
---------------------  From: Leigh Bautista LPN (Phone Messages Pool ([id number]))   To: [facility] Message Pool ([id number]_[place]);     Sent: 4/29/2019 6:36:51 PM CDT  Subject: MRI clarification     Phone Message    PCP:   none      Time of Call:  5:46pm       Person Calling:  [facility]  Phone number:  [phone]    Note:   Caller LM stating they received order for MRI brain with contrast. Caller says order is for a headache based reasoning and is needing to clarify if it is indeed needing to be done with contrast or if they can do it without.    They need clarification before they call pt to schedule. Please advise.    Last office visit and reason:  4/29/19 note not yet available---------------------  From: Estela Crabtree CMA ([facility] Message Pool ([id number]_[facility]))   To: Deepthi Cormier MD;     Sent: 4/29/2019 6:38:46 PM CDT  Subject: FW: MRI clarification     Please advise.---------------------  From: Deepthi Cormier MD   To: [facility] Message Pool ([id number]_[place]);     Sent: 4/29/2019 7:26:12 PM CDT  Subject: RE: MRI clarification     ok to do without contrast, thanksSpoke to [facility] at 0809. Informed them that MRI can be done without contrast per [facility]. They verbalized understanding and had no further questions. Will fax new order.faxed new order per Malena.Called and confirmed that fax was received.

## 2022-02-15 NOTE — NURSING NOTE
Comprehensive Intake Entered On:  4/29/2019 12:46 PM CDT    Performed On:  4/29/2019 12:42 PM CDT by Estela Crabtree CMA               Summary   Chief Complaint :   f/u nausea. Vomiting at least once daily    Menstrual Status :   Menarcheal   Weight Measured :   157.4 lb(Converted to: 157 lb 6 oz, 71.40 kg)    Systolic Blood Pressure :   90 mmHg   Diastolic Blood Pressure :   64 mmHg   Mean Arterial Pressure :   73 mmHg   Peripheral Pulse Rate :   83 bpm   BP Site :   Right arm   BP Method :   Manual   HR Method :   Electronic   Temperature Tympanic :   97.4 DegF(Converted to: 36.3 DegC)  (LOW)    Oxygen Saturation :   96 %   Estela Crabtree CMA - 4/29/2019 12:42 PM CDT   Health Status   Allergies Verified? :   Yes   Medication History Verified? :   Yes   Immunizations Current :   No   Pre-Visit Planning Status :   Completed   Tobacco Use? :   Never smoker   Estela Crabtree CMA - 4/29/2019 12:42 PM CDT   Consents   Consent for Immunization Exchange :   Consent Granted   Consent for Immunizations to Providers :   Consent Granted   Estela Crabtree CMA - 4/29/2019 12:42 PM CDT   Meds / Allergies   (As Of: 4/29/2019 12:46:30 PM CDT)   Allergies (Active)   Omnicef  Estimated Onset Date:   Unspecified ; Reactions:   Hives ; Created By:   Isabel Estrada CMA; Reaction Status:   Active ; Category:   Drug ; Substance:   Omnicef ; Type:   Allergy ; Updated By:   Isabel Estrada CMA; Reviewed Date:   4/17/2019 12:32 PM CDT        Medication List   (As Of: 4/29/2019 12:46:30 PM CDT)   Prescription/Discharge Order    ondansetron  :   ondansetron ; Status:   Prescribed ; Ordered As Mnemonic:   ondansetron 4 mg oral tablet, disintegrating ; Simple Display Line:   4 mg, 1 tab(s), PO, q8 hrs, 10 tab(s), 0 Refill(s) ; Ordering Provider:   Deepthi Cormier MD; Catalog Code:   ondansetron ; Order Dt/Tm:   4/24/2019 1:11:23 PM            Home Meds    ethinyl estradiol-norgestimate  :   ethinyl estradiol-norgestimate ; Status:   Documented ;  Ordered As Mnemonic:   Sprintec 0.25 mg-35 mcg oral tablet ; Simple Display Line:   1 tab(s), Oral, daily, 0 Refill(s) ; Catalog Code:   ethinyl estradiol-norgestimate ; Order Dt/Tm:   9/10/2018 9:34:25 AM

## 2022-02-15 NOTE — PROGRESS NOTES
Patient:   MESERET HATCH            MRN: [id number]            FIN: [id number]               Age:   17 years     Sex:  Female     :  2000   Associated Diagnoses:   Vomiting   Author:   Marika Plaza      Visit Information      Date of Service: 09/10/2018 09:28 am  Performing Location: [facility]  Encounter#: [id number]      Primary Care Provider (PCP):  NONE ,       Referring Provider:  Marika Plaza    NPI# [id number]      Chief Complaint   9/10/2018 9:31 AM CDT    Patient presents for vomited couple times, sharp pain in L side, aches since Friday      History of Present Illness   Meseret Hatch is a very pleasant, otherwise healthy 18 y/o female who presents to clinic with 3 days of nausea, vomiting, headaches, joint and body aches. She notes that she ate out at a restaurant prior to her symtoms starting, no one else has similar symptoms. She had 6 total epiosdes of vomiting, none in the past 24 hours, and the first one was at the restaurant. Yesterday, she noticed that she was lightheaded while standing and shortness of breath with exertion.  She has been able to keep fluids and fluids down but has had a decreased appetite. She feels the joint pain mostly in her fingers.   Of note, she had her gallbladder removed 2018 and since then has had inconsistent bowel movements, intermittent abdominal pain, and weight loss vs weight gain. For the past week she has had left upper abdominal pain but notes that this is typical as she gets pain on both sides since her surgery.   LMP  during placebo pills. She is on oc's and she and partner always use condoms.      Review of Systems   Constitutional:  Fatigue.    Eye:  Negative.    Ear/Nose/Mouth/Throat:  Negative.    Respiratory:  Shortness of breath.    Cardiovascular:  Negative.    Gastrointestinal:  Nausea, Vomiting, No diarrhea, No constipation, No heartburn, No abdominal pain, No hematemesis.    Genitourinary:  Negative.     Musculoskeletal:  Negative except as documented in history of present illness.    Neurologic:  Headache.    Psychiatric:  Negative.       Health Status   Allergies:    Allergic Reactions (Selected)  Severity Not Documented  Omnicef (Hives)   Medications:  (Selected)   Documented Medications  Documented  Sprintec 0.25 mg-35 mcg oral tablet: 1 tab(s), Oral, daily, 0 Refill(s), Type: Maintenance      Histories   Past Medical History:    No active or resolved past medical history items have been selected or recorded.   Family History:    No family history items have been selected or recorded.   Procedure history:    No active procedure history items have been selected or recorded.   Social History:             No active social history items have been recorded.      Physical Examination   Vital Signs   9/10/2018 9:31 AM CDT Temperature Tympanic 97.5 DegF  LOW    Peripheral Pulse Rate 75 bpm    HR Method Electronic    Systolic Blood Pressure 100 mmHg    Diastolic Blood Pressure 60 mmHg    Mean Arterial Pressure 73 mmHg    BP Site Right arm    BP Method Manual      Measurements from flowsheet : Measurements   9/10/2018 9:31 AM CDT    Weight Measured - Standard                139.6 lb     General:  No acute distress.    Eye:  Pupils are equal, round and reactive to light, Extraocular movements are intact, Normal conjunctiva.    HENT:  Tympanic membranes are clear, Oral mucosa is moist, No pharyngeal erythema.    Neck:  Supple, Non-tender, Anterior cervical lymphadenopathy..    Respiratory:  Lungs are clear to auscultation.    Cardiovascular:  Normal rate, Regular rhythm, No murmur, Good pulses equal in all extremities.    Gastrointestinal:  Soft, Non-distended, Normal bowel sounds, Right lower quadrant tenderness to palpation. No rebound or guarding. , Negative psoas sign..    Genitourinary:  No costovertebral angle tenderness.    Musculoskeletal:  Normal range of motion, Normal strength.    Integumentary:  Warm, Dry, No  rash.    Neurologic:  Alert, Oriented, No focal deficits.    Psychiatric:  Appropriate mood & affect.       Review / Management   Results review:  CBC and chem 8 acceptable  she feels the zofran was helpful.       Impression and Plan   Diagnosis     Vomiting (TVE23-NO R11.10).     Course:  Improving.    Plan:  she will drink clear liquids for 24 hours and expect continued improvement  if worsening in anyway, rtc and explore further etiology.    Patient Instructions:       Counseled: Patient, Regarding diagnosis, Regarding treatment, Diet, Verbalized understanding.

## 2022-02-15 NOTE — NURSING NOTE
"Comprehensive Intake Entered On:  9/9/2020 12:02 PM CDT    Performed On:  9/9/2020 11:54 AM CDT by Aníbal Luna CMA               Summary   Chief Complaint :   Verbal consent given for a video visit.  Pt is c/o sore throat,runny nose and headache x 2 days.  Pt states she was \"tested for covid\" yesterday on [facility] and \"rapid 15 minute antigen test came back negative but the 3-5 day test is still pending\"   Menstrual Status :   Menarcheal   Aníbal Luna CMA - 9/9/2020 11:54 AM CDT   Health Status   Allergies Verified? :   Yes   Medication History Verified? :   Yes   Immunizations Current :   No   Medical History Verified? :   Yes   Pre-Visit Planning Status :   Not completed   Tobacco Use? :   Never smoker   Aníbal Luna CMA - 9/9/2020 11:54 AM CDT   Meds / Allergies   (As Of: 9/9/2020 12:02:33 PM CDT)   Allergies (Active)   Dust Mite  Estimated Onset Date:   Unspecified ; Created By:   Celsa Duff; Reaction Status:   Active ; Category:   Drug ; Substance:   Dust Mite ; Type:   Allergy ; Updated By:   Celsa Duff; Reviewed Date:   9/9/2020 11:58 AM CDT      Omnicef  Estimated Onset Date:   Unspecified ; Reactions:   Hives ; Created By:   Isabel Estrada CMA; Reaction Status:   Active ; Category:   Drug ; Substance:   Omnicef ; Type:   Allergy ; Updated By:   Isabel Estrada CMA; Reviewed Date:   9/9/2020 11:58 AM CDT        Medication List   (As Of: 9/9/2020 12:02:33 PM CDT)   Prescription/Discharge Order    fluconazole  :   fluconazole ; Status:   Completed ; Ordered As Mnemonic:   fluconazole 100 mg oral tablet ; Simple Display Line:   See Instructions, 1 tab(s) Oral every other day for 3 doses, 3 EA, 0 Refill(s) ; Ordering Provider:   Marika Plaza; Catalog Code:   fluconazole ; Order Dt/Tm:   11/12/2019 2:08:22 PM CST          nystatin topical  :   nystatin topical ; Status:   Completed ; Ordered As Mnemonic:   nystatin 100,000 units/g topical cream ; Simple Display Line: "   1 nickolas, Topical, tid, for 10 day(s), to perianal area and externally vaginal area, 15 gm, 0 Refill(s) ; Ordering Provider:   Marika Plaza; Catalog Code:   nystatin topical ; Order Dt/Tm:   11/12/2019 2:09:29 PM CST          ondansetron  :   ondansetron ; Status:   Completed ; Ordered As Mnemonic:   ondansetron 4 mg oral tablet, disintegrating ; Simple Display Line:   4 mg, 1 tab(s), Oral, q8 hrs, 10 tab(s), 1 Refill(s) ; Ordering Provider:   Deepthi Cormier MD; Catalog Code:   ondansetron ; Order Dt/Tm:   10/10/2019 2:29:21 PM CDT            Home Meds    fluticasone-salmeterol  :   fluticasone-salmeterol ; Status:   Completed ; Ordered As Mnemonic:   Advair HFA 45 mcg-21 mcg/inh inhalation aerosol ; Simple Display Line:   2 puff(s), Inhale, bid, 0 Refill(s) ; Catalog Code:   fluticasone-salmeterol ; Order Dt/Tm:   10/23/2019 11:24:30 AM CDT          loratadine  :   loratadine ; Status:   Completed ; Ordered As Mnemonic:   Claritin 10 mg oral tablet ; Simple Display Line:   See Instructions, Up to 4 times daily., 0 Refill(s) ; Catalog Code:   loratadine ; Order Dt/Tm:   10/10/2019 1:02:26 PM CDT          levothyroxine  :   levothyroxine ; Status:   Completed ; Ordered As Mnemonic:   levothyroxine 75 mcg (0.075 mg) oral tablet ; Simple Display Line:   75 mcg, 1 tab(s), Oral, daily, 0 Refill(s) ; Catalog Code:   levothyroxine ; Order Dt/Tm:   10/10/2019 1:02:01 PM CDT          levothyroxine  :   levothyroxine ; Status:   Documented ; Ordered As Mnemonic:   levothyroxine 125 mcg (0.125 mg) oral tablet ; Simple Display Line:   125 mcg, 1 tab(s), Oral, daily, 0 Refill(s) ; Catalog Code:   levothyroxine ; Order Dt/Tm:   9/9/2020 11:58:22 AM CDT          ethinyl estradiol-norgestimate  :   ethinyl estradiol-norgestimate ; Status:   Documented ; Ordered As Mnemonic:   Sprintec 0.25 mg-35 mcg oral tablet ; Simple Display Line:   1 tab(s), Oral, daily, 0 Refill(s) ; Catalog Code:   ethinyl estradiol-norgestimate ;  Order Dt/Tm:   9/10/2018 9:34:25 AM CDT            ID Risk Screen   Recent Travel History :   No recent travel   Family Member Travel History :   No recent travel   Other Exposure to Infectious Disease :   Unknown   Aníbal Luna CMA - 9/9/2020 11:54 AM CDT   Past Medical History   Past Medical History   (As Of: 9/9/2020 12:02:33 PM CDT)     Procedures / Surgeries        -    Procedure History   (As Of: 9/9/2020 12:02:33 PM CDT)     Procedure Dt/Tm:   2/21/2018 ; Anesthesia Minutes:   0 ; Procedure Name:   Laparoscopic cholecystectomy ; Procedure Minutes:   0 ; Last Reviewed Dt/Tm:   9/9/2020 11:59:38 AM CDT            Anesthesia Minutes:   0 ; Procedure Name:   Extraction of wisdom tooth ; Procedure Minutes:   0 ; Last Reviewed Dt/Tm:   9/9/2020 11:59:38 AM CDT            Social History   Social History   (As Of: 9/9/2020 12:02:33 PM CDT)   Tobacco:        Never (less than 100 in lifetime)   (Last Updated: 9/9/2020 11:59:30 AM CDT by Aníbal Luna CMA)          Substance Abuse:        Never   (Last Updated: 9/24/2018 10:31:33 AM CDT by Kaitlyn Sweet)          Employment/School:        Part time, Work/School description: Full Time student.   (Last Updated: 9/24/2018 10:31:46 AM CDT by Kaitlyn Sweet)          Home/Environment:        Marital status: Single.  Risks in environment: Unlocked guns, Stairs.   (Last Updated: 9/24/2018 10:31:55 AM CDT by Kaitlyn Sweet)          Nutrition/Health:        Type of diet: Regular.   (Last Updated: 9/24/2018 10:32:00 AM CDT by Kaitlyn Sweet)          Sexual:        Sexually active: Yes.  Identifies as female, Sexual orientation: Straight or heterosexual.  Uses condoms: Yes.  Contraceptive Use Details: Birth control pill.   (Last Updated: 9/24/2018 10:32:14 AM CDT by Kaitlyn Sweet)

## 2022-02-15 NOTE — NURSING NOTE
Comprehensive Intake Entered On:  12/30/2020 2:22 PM CST    Performed On:  12/30/2020 2:18 PM CST by Aníbal Luna CMA               Summary   Chief Complaint :   Verbal consent given for a video visit.  Pt is c/o cough,sore throat,chills,headache and runny nose x 2-3 days.   Menstrual Status :   Menarcheal   Aníbal Luna CMA - 12/30/2020 2:18 PM CST   Health Status   Allergies Verified? :   Yes   Immunizations Current :   No   Medical History Verified? :   Yes   Aníbal Luna CMA - 12/30/2020 2:18 PM CST   Meds / Allergies   (As Of: 12/30/2020 2:22:03 PM CST)   Allergies (Active)   Dust Mite  Estimated Onset Date:   Unspecified ; Created By:   Celsa Duff; Reaction Status:   Active ; Category:   Drug ; Substance:   Dust Mite ; Type:   Allergy ; Updated By:   Celsa Duff; Reviewed Date:   12/30/2020 2:20 PM CST      Omnicef  Estimated Onset Date:   Unspecified ; Reactions:   Hives ; Created By:   Isabel Estrada CMA; Reaction Status:   Active ; Category:   Drug ; Substance:   Omnicef ; Type:   Allergy ; Updated By:   Isabel Estrada CMA; Reviewed Date:   12/30/2020 2:20 PM CST        Medication List   (As Of: 12/30/2020 2:22:03 PM CST)   Prescription/Discharge Order    fluconazole  :   fluconazole ; Status:   Processing ; Ordered As Mnemonic:   fluconazole 150 mg oral tablet ; Ordering Provider:   Marika Plaza; Action Display:   Complete ; Catalog Code:   fluconazole ; Order Dt/Tm:   12/30/2020 2:19:58 PM CST            Home Meds    levothyroxine  :   levothyroxine ; Status:   Documented ; Ordered As Mnemonic:   levothyroxine 125 mcg (0.125 mg) oral tablet ; Simple Display Line:   125 mcg, 1 tab(s), Oral, daily, 0 Refill(s) ; Catalog Code:   levothyroxine ; Order Dt/Tm:   9/9/2020 11:58:22 AM CDT          ethinyl estradiol-norgestimate  :   ethinyl estradiol-norgestimate ; Status:   Documented ; Ordered As Mnemonic:   Sprintec 0.25 mg-35 mcg oral tablet ; Simple Display  Line:   1 tab(s), Oral, daily, 0 Refill(s) ; Catalog Code:   ethinyl estradiol-norgestimate ; Order Dt/Tm:   9/10/2018 9:34:25 AM CDT            ID Risk Screen   Recent Travel History :   Last travel within 21 days   Family Member Travel History :   Last travel within 21 days   Other Exposure to Infectious Disease :   Unknown   Aníbal Luna CMA - 12/30/2020 2:18 PM CST   Social History   Social History   (As Of: 12/30/2020 2:22:03 PM CST)   Tobacco:        Never (less than 100 in lifetime)   (Last Updated: 12/3/2020 12:46:29 PM CST by Morenita Sood LPN)          Electronic Cigarette/Vaping:        Electronic Cigarette Use: has vaped in the past; nothing recently.   (Last Updated: 12/3/2020 12:46:56 PM CST by Morenita Sood LPN)          Substance Abuse:        Never   (Last Updated: 9/24/2018 10:31:33 AM CDT by Kaitlyn Sweet)          Employment/School:        Part time, Work/School description: Full Time student-[employer] at [employer].   (Last Updated: 9/9/2020 12:02:48 PM CDT by Aníbal Luna CMA)          Home/Environment:        Marital status: Single.  Risks in environment: Unlocked guns, Stairs.   (Last Updated: 9/24/2018 10:31:55 AM CDT by Kaitlyn Sweet)          Nutrition/Health:        Type of diet: Regular.   (Last Updated: 9/24/2018 10:32:00 AM CDT by Kaitlyn Sweet)          Sexual:        Sexually active: Yes.  Identifies as female, Sexual orientation: Straight or heterosexual.  Uses condoms: Yes.  Contraceptive Use Details: Birth control pill.   (Last Updated: 9/24/2018 10:32:14 AM CDT by Kaitlyn Sweet)

## 2022-02-15 NOTE — TELEPHONE ENCOUNTER
---------------------  From: Aníbal Luna CMA ([facility] Message Pool ([id number]))   To: Appointment Pool ([id number]_[place]);     Sent: 12/30/2020 2:40:52 PM CST !  Subject: covid testing     Please add pt to covid schedule today per [facility].  Thank You Aníbal Luna CMa---------------------  From: Madiha Francois (Appointment Pool ([id number]_[place]))   To: [facility] Message Pool ([id number]_[place]);     Sent: 12/30/2020 2:57:10 PM CST  Subject: RE: covid testing     PT SCHEDULED

## 2022-02-15 NOTE — PROGRESS NOTES
Chief Complaint    c/o abdominal pain with vomiting. Started this morning. Stomach did feel off yesterday. Took some zofran without relief  History of Present Illness      18-year-old here with abdominal pain vomiting       Patient says she has had occasional abdominal pains over the years.  Letter having her gallbladder out.  She also will occasionally throw up but is only one time a month and is usually a single episode and then she goes about the rest the day okay.       This morning she woke up said she had a lot of cramping pain down lower in her abdomen and she threw up for 5 times.  She try to go to class but threw up again and thought the pain was a little worse so she came into the clinic to be seen.       She has not been able to eat or drink today she has had a few sips of water but always threw up shortly afterwards.  She denies any diarrhea she does not have any fever or chills       No rashes       No headache or visual changes  Review of Systems      See HPI.  All other review of systems negative.  Physical Exam   Vitals & Measurements    T: 98.9   F (Tympanic)  HR: 72(Peripheral)  BP: 118/64       She is alert little uncomfortable preferring to lie down.      Sclera white oromucosa slightly dry lips are dry      Neck is supple no adenopathy      Lungs are clear to auscultation      Heart regular rate       Abdomen is general tenderness which she flinches and has a little rebounding with light palpation of the right lower quadrant  Assessment/Plan       Abdominal pain (R10.9)         Patient's white count is elevated at 20.5 thousand        Pregnancy test is negative         Electrolytes normal amylase is normal pregnancy test is negative CT of the abdomen has been read radiologist as early appendicitis because of stranding appendiceal dilation with some appendiceal nodes         I have talked the patient she is a college student here but would like to go to her home hospital in [place] to be  evaluated by a surgeon is contacted them I think is quite safe for her to be driven by her boyfriend IVs in place she is received 1 L of normal saline here awaiting results she has not received anything for pain she says she can put up with it so far she last took some Zofran at 8:00 this morning         Ordered:          Amylase Level (Request), Priority: Urgent, Abdominal pain          Basic Metabolic Panel (Request), Priority: Urgent, Abdominal pain          CBC w/o Diff (Request), Priority: Urgent, Abdominal pain          CT Abdomen and Pelvis w/contrast (Request), Instructions: IV CONTRAST (no oral) stat-STAT, Abdominal pain          XR Abdomen Flat and Upright (Request), Abdominal pain                Orders:         Hepatic Function Panel* ([facility]), Specimen Type: Serum, Collection Date: 10/23/19 11:34:00 CDT         Lipase* ([facility]), Specimen Type: Serum, Collection Date: 10/23/19 11:34:00 CDT         POC HCG, URINE* ([facility]), Specimen Type: Urine, Collection Date: 10/23/19 11:34:00 CDT         POC URINALYSIS, UA* ([facility]), Specimen Type: Urine, Collection Date: 10/23/19 11:34:00 CDT         Review Orders for Potential Authorizations, 10/23/19 13:12:42 CDT         Review Orders for Potential Authorizations, 10/23/19 13:13:52 CDT         TSH* ([facility]), Specimen Type: Serum, Collection Date: 10/23/19 11:34:00 CDT  Patient Information     Name:OBI BLACKWOOD      Address:      [facility] [address] [id number]     Sex:Female     YOB: 2000     [phone]     Emergency Contact:JESUS BLACKWOOD     MRN:[id number]     FIN:[id number]     Location:[facility]     Date of Service:10/23/2019      Primary Care Physician:       NONE ,       Attending Physician:       Christian Sewell MD, [phone]  Problem List/Past Medical History    Ongoing     Abnormal brain MRI       Comments: convex areaon pituitary seen on brain MRI     Chronic abdominal  pain     Eczema     Hx of cholecystectomy     Mild intermittent asthma     Pituitary adenoma    Historical     No qualifying data  Procedure/Surgical History     Laparoscopic cholecystectomy (02/21/2018)     Extraction of wisdom tooth  Medications    Advair HFA 45 mcg-21 mcg/inh inhalation aerosol, 2 puff(s), Inhale, bid    Claritin 10 mg oral tablet, See Instructions    levothyroxine 75 mcg (0.075 mg) oral tablet, 75 mcg= 1 tab(s), Oral, daily    ondansetron 4 mg oral tablet, disintegrating, 4 mg= 1 tab(s), Oral, q8 hrs, 1 refills    Sprintec 0.25 mg-35 mcg oral tablet, 1 tab(s), Oral, daily  Allergies    Dust Mite    Omnicef (Hives)  Social History    Smoking Status - 10/23/2019     Never smoker     Employment/School      Part time, Work/School description: Full Time student., 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 Risks in environment: Unlocked guns, Stairs., 09/24/2018     Nutrition/Health      Type of diet: Regular., 09/24/2018     Sexual      Sexually active: Yes. Identifies as female, Sexual orientation: Straight or heterosexual. Uses condoms: Yes. Contraceptive Use Details: Birth control pill., 09/24/2018     Substance Abuse      Never, 09/24/2018     Tobacco      Rarely, 09/24/2018  Family History    Cancer: Uncle (M).    Depression: Mother.    Diabetes: Grandmother (P).    Hypertension: Grandfather (P).    Migraine: Mother.    Obese: Grandmother (P).  Immunizations      Vaccine Date Status      human papillomavirus vaccine 03/20/2018 Recorded      human papillomavirus vaccine 07/24/2017 Recorded      meningococcal conjugate vaccine 03/13/2017 Recorded      human papillomavirus vaccine 03/13/2017 Recorded      tetanus/diphth/pertuss (Tdap) adult/adol 10/07/2011 Recorded      influenza virus vaccine, inactivated 12/21/2009 Recorded      influenza virus vaccine, inactivated 10/31/2008 Recorded      varicella 08/29/2008 Recorded      DTaP 08/25/2005 Recorded      MMR  (measles/mumps/rubella) 08/25/2005 Recorded      IPV 08/25/2005 Recorded      DTaP 05/10/2001 Recorded      IPV 05/10/2001 Recorded      varicella 11/21/2000 Recorded      Hep B 11/21/2000 Recorded      MMR (measles/mumps/rubella) 11/21/2000 Recorded      DTaP 07/21/2000 Recorded      Hib 07/21/2000 Recorded      Hib (PRP-T) 07/21/2000 Recorded      DTaP 04/10/2000 Recorded      Hep B-Hib 04/10/2000 Recorded      IPV 04/10/2000 Recorded      DTaP 02/14/2000 Recorded      Hep B-Hib 02/14/2000 Recorded      IPV 02/14/2000 Recorded  Lab Results          Lab Results (Last 4 results within 90 days)          U HCG POC: NEGATIVE [NEGATIVE  - NEGATIVE] (10/23/19 12:14:00)          Sodium Level: 140 [135 mmol/L - 145 mmol/L] (10/23/19 12:13:00)          Sodium Level: 139 [135 mmol/L - 145 mmol/L] (10/10/19 13:23:00)          Potassium Level: 4.1 [3.5 mmol/L - 5 mmol/L] (10/23/19 12:13:00)          Potassium Level: 4.0 [3.5 mmol/L - 5 mmol/L] (10/10/19 13:23:00)          Chloride Level: 106 [98 mmol/L - 110 mmol/L] (10/23/19 12:13:00)          Chloride Level: 107 [98 mmol/L - 110 mmol/L] (10/10/19 13:23:00)          CO2 Level: 24 [21 mmol/L - 31 mmol/L] (10/23/19 12:13:00)          CO2 Level: 23 [21 mmol/L - 31 mmol/L] (10/10/19 13:23:00)          AGAP: 10 [5  - 18] (10/23/19 12:13:00)          AGAP: 9 [5  - 18] (10/10/19 13:23:00)          Glucose Level: 111 High [65 mg/dL - 100 mg/dL] (10/23/19 12:13:00)          Glucose Level: 106 High [65 mg/dL - 100 mg/dL] (10/10/19 13:23:00)          BUN: 12 [8 mg/dL - 25 mg/dL] (10/23/19 12:13:00)          BUN: 12 [8 mg/dL - 25 mg/dL] (10/10/19 13:23:00)          Creatinine Level: 0.86 [0.57 mg/dL - 1.11 mg/dL] (10/23/19 12:13:00)          Creatinine Level: 0.89 [0.57 mg/dL - 1.11 mg/dL] (10/10/19 13:23:00)          BUN/Creat Ratio: 14 [10  - 20] (10/23/19 12:13:00)          BUN/Creat Ratio: 13 [10  - 20] (10/10/19 13:23:00)          eGFR : >60 (10/23/19 12:13:00)           eGFR : >60 (10/10/19 13:23:00)          eGFR Non-: >60 (10/23/19 12:13:00)          eGFR Non-: >60 (10/10/19 13:23:00)          Calcium Level: 9.6 [8.5 mg/dL - 10.5 mg/dL] (10/23/19 12:13:00)          Calcium Level: 10.3 [8.5 mg/dL - 10.5 mg/dL] (10/10/19 13:23:00)          Amylase Level: 36 [25 IU/L - 125 IU/L] (10/23/19 12:13:00)          WBC: 20.8 High [4.5  - 11] (10/23/19 12:13:00)          WBC: 7.6 [4.5  - 11] (10/10/19 13:23:00)          RBC: 4.90 [4  - 5.2] (10/23/19 12:13:00)          RBC: 4.37 [4  - 5.2] (10/10/19 13:23:00)          Hgb: 13.8 [12 g/dL - 16 g/dL] (10/23/19 12:13:00)          Hgb: 12.3 [12 g/dL - 16 g/dL] (10/10/19 13:23:00)          Hct: 40.0 [33 % - 51 %] (10/23/19 12:13:00)          Hct: 35.7 [33 % - 51 %] (10/10/19 13:23:00)          MCV: 82 [80 fL - 100 fL] (10/23/19 12:13:00)          MCV: 82 [80 fL - 100 fL] (10/10/19 13:23:00)          MCH: 28.2 [26 pg - 34 pg] (10/23/19 12:13:00)          MCH: 28.1 [26 pg - 34 pg] (10/10/19 13:23:00)          MCHC: 34.5 [32 g/dL - 36 g/dL] (10/23/19 12:13:00)          MCHC: 34.5 [32 g/dL - 36 g/dL] (10/10/19 13:23:00)          RDW: 12.7 [11.5 % - 15.5 %] (10/23/19 12:13:00)          RDW: 12.5 [11.5 % - 15.5 %] (10/10/19 13:23:00)          Platelet: 302 [140  - 440] (10/23/19 12:13:00)          Platelet: 281 [140  - 440] (10/10/19 13:23:00)          MPV: 10.0 [6.5 fL - 11 fL] (10/23/19 12:13:00)          MPV: 9.7 [6.5 fL - 11 fL] (10/10/19 13:23:00)          Lymphocytes: 24.5 (10/10/19 13:23:00)          Abs Lymphocytes: 1.9 [0.9  - 2.9] (10/10/19 13:23:00)          Neutrophils: 64.1 (10/10/19 13:23:00)          Abs Neutrophils: 4.9 [1.7  - 7] (10/10/19 13:23:00)          Monocytes: 7.9 (10/10/19 13:23:00)          Abs Monocytes: 0.6 (10/10/19 13:23:00)          Eosinophils: 3.1 (10/10/19 13:23:00)          Abs Eosinophils: 0.2 (10/10/19 13:23:00)          Basophils: 0.4 (10/10/19 13:23:00)           Abs Basophils: 0.0 (10/10/19 13:23:00)          UA pH: 6 [5  - 8] (10/23/19 12:14:00)          UA Specific Gravity: 1.025 [1.001  - 1.035] (10/23/19 12:14:00)          UA Glucose: NEGATIVE [NEGATIVE  - NEGATIVE] (10/23/19 12:14:00)          UA Bilirubin: NEGATIVE [NEGATIVE  - NEGATIVE] (10/23/19 12:14:00)          UA Ketones: 2+ Abnormal [NEGATIVE  - NEGATIVE] (10/23/19 12:14:00)          Urine Occult Blood: TRACE Abnormal [NEGATIVE  - NEGATIVE] (10/23/19 12:14:00)          UA Protein: NEGATIVE [NEGATIVE  - NEGATIVE] (10/23/19 12:14:00)          UA Nitrite: NEGATIVE [NEGATIVE  - NEGATIVE] (10/23/19 12:14:00)          UA Leukocyte Esterase: NEGATIVE [NEGATIVE  - NEGATIVE] (10/23/19 12:14:00)          UA Epithelial Cells: Moderate [None  - Few] (10/23/19 12:14:00)          UA WBC: 3-5 [None  - 5] (10/23/19 12:14:00)          UA RBC: 0-2 [None  - 2] (10/23/19 12:14:00)          UA Bacteria: Moderate [None  - Few] (10/23/19 12:14:00)

## 2022-02-15 NOTE — NURSING NOTE
Comprehensive Intake Entered On:  12/3/2020 12:48 PM CST    Performed On:  12/3/2020 12:43 PM CST by Morenita Sood LPN               Summary   Chief Complaint :   vaginal discomfort, started Monday or Tuesday, seems to be fairly constant, denies burning with urination, a little more discharge than normal   Last Menstrual Period :   11/6/2020 CST   Menstrual Status :   Menarcheal   Weight Measured :   159.9 lb(Converted to: 159 lb 14 oz, 72.529 kg)    Systolic Blood Pressure :   124 mmHg   Diastolic Blood Pressure :   60 mmHg   Mean Arterial Pressure :   81 mmHg   Peripheral Pulse Rate :   88 bpm   BP Site :   Right arm   BP Method :   Manual   Temperature Tympanic :   98.2 DegF(Converted to: 36.8 DegC)    Oxygen Saturation :   99 %   Morenita Sood LPN - 12/3/2020 12:43 PM CST   Health Status   Allergies Verified? :   Yes   Medication History Verified? :   Yes   Immunizations Current :   No   Medical History Verified? :   No   Pre-Visit Planning Status :   Completed   Tobacco Use? :   Never smoker   Morenita Sood LPN - 12/3/2020 12:43 PM CST   Meds / Allergies   (As Of: 12/3/2020 12:48:57 PM CST)   Allergies (Active)   Dust Mite  Estimated Onset Date:   Unspecified ; Created By:   Celsa Duff; Reaction Status:   Active ; Category:   Drug ; Substance:   Dust Mite ; Type:   Allergy ; Updated By:   Celsa Duff; Reviewed Date:   9/9/2020 11:58 AM CDT      Omnicef  Estimated Onset Date:   Unspecified ; Reactions:   Hives ; Created By:   Isabel Estrada CMA; Reaction Status:   Active ; Category:   Drug ; Substance:   Omnicef ; Type:   Allergy ; Updated By:   Isabel Estrada CMA; Reviewed Date:   9/9/2020 11:58 AM CDT        Medication List   (As Of: 12/3/2020 12:48:57 PM CST)   Home Meds    levothyroxine  :   levothyroxine ; Status:   Documented ; Ordered As Mnemonic:   levothyroxine 125 mcg (0.125 mg) oral tablet ; Simple Display Line:   125 mcg, 1 tab(s), Oral, daily, 0  Refill(s) ; Catalog Code:   levothyroxine ; Order Dt/Tm:   9/9/2020 11:58:22 AM CDT          ethinyl estradiol-norgestimate  :   ethinyl estradiol-norgestimate ; Status:   Documented ; Ordered As Mnemonic:   Sprintec 0.25 mg-35 mcg oral tablet ; Simple Display Line:   1 tab(s), Oral, daily, 0 Refill(s) ; Catalog Code:   ethinyl estradiol-norgestimate ; Order Dt/Tm:   9/10/2018 9:34:25 AM CDT            ID Risk Screen   Recent Travel History :   No recent travel   Family Member Travel History :   No recent travel   Other Exposure to Infectious Disease :   Unknown   Morenita Sood LPN - 12/3/2020 12:43 PM CST   Social History   Social History   (As Of: 12/3/2020 12:48:57 PM CST)   Tobacco:        Never (less than 100 in lifetime)   (Last Updated: 12/3/2020 12:46:29 PM CST by Morenita Sood LPN)          Electronic Cigarette/Vaping:        Electronic Cigarette Use: has vaped in the past; nothing recently.   (Last Updated: 12/3/2020 12:46:56 PM CST by Morenita Sood LPN)          Substance Abuse:        Never   (Last Updated: 9/24/2018 10:31:33 AM CDT by Kaitlyn Sweet)          Employment/School:        Part time, Work/School description: Full Time student-[employer] at [employer].   (Last Updated: 9/9/2020 12:02:48 PM CDT by Aníbal Luna CMA)          Home/Environment:        Marital status: Single.  Risks in environment: Unlocked guns, Stairs.   (Last Updated: 9/24/2018 10:31:55 AM CDT by Kaitlyn Sweet)          Nutrition/Health:        Type of diet: Regular.   (Last Updated: 9/24/2018 10:32:00 AM CDT by Kaitlyn Sweet)          Sexual:        Sexually active: Yes.  Identifies as female, Sexual orientation: Straight or heterosexual.  Uses condoms: Yes.  Contraceptive Use Details: Birth control pill.   (Last Updated: 9/24/2018 10:32:14 AM CDT by Kaitlyn Sweet)

## 2022-02-15 NOTE — NURSING NOTE
Comprehensive Intake Entered On:  10/10/2019 1:03 PM CDT    Performed On:  10/10/2019 12:58 PM CDT by Celsa Duff               Summary   Chief Complaint :   Pt here today nausea and vomiting. Temp is  between 97-99.4   Menstrual Status :   Menarcheal   Weight Measured :   155.2 lb(Converted to: 155 lb 3 oz, 70.40 kg)    Height Measured :   62.75 in(Converted to: 5 ft 3 in, 159.38 cm)    Body Mass Index :   27.71 kg/m2   Body Surface Area :   1.76 m2   Systolic Blood Pressure :   92 mmHg   Diastolic Blood Pressure :   70 mmHg   Mean Arterial Pressure :   77 mmHg   Peripheral Pulse Rate :   86 bpm   BP Site :   Right arm   BP Method :   Manual   HR Method :   Electronic   Oxygen Saturation :   98 %   Celsa Duff - 10/10/2019 12:58 PM CDT   Health Status   Allergies Verified? :   Yes   Medication History Verified? :   Yes   Immunizations Current :   No   Medical History Verified? :   Yes   Pre-Visit Planning Status :   Completed   Celsa Duff - 10/10/2019 12:58 PM CDT   Consents   Consent for Immunization Exchange :   Consent Granted   Consent for Immunizations to Providers :   Consent Granted   Celsa Duff - 10/10/2019 12:58 PM CDT   Meds / Allergies   (As Of: 10/10/2019 1:03:06 PM CDT)   Allergies (Active)   Dust Mite  Estimated Onset Date:   Unspecified ; Created By:   Celsa Duff; Reaction Status:   Active ; Category:   Drug ; Substance:   Dust Mite ; Type:   Allergy ; Updated By:   Celsa Duff; Reviewed Date:   10/10/2019 1:02 PM CDT      Omnicef  Estimated Onset Date:   Unspecified ; Reactions:   Hives ; Created By:   Isabel Estrada CMA; Reaction Status:   Active ; Category:   Drug ; Substance:   Omnicef ; Type:   Allergy ; Updated By:   Isabel Estrada CMA; Reviewed Date:   10/10/2019 1:02 PM CDT        Medication List   (As Of: 10/10/2019 1:03:06 PM CDT)   Prescription/Discharge Order    ondansetron  :   ondansetron ;  Status:   Completed ; Ordered As Mnemonic:   ondansetron 4 mg oral tablet, disintegrating ; Simple Display Line:   4 mg, 1 tab(s), PO, q8 hrs, 10 tab(s), 0 Refill(s) ; Ordering Provider:   Deepthi Cormier MD; Catalog Code:   ondansetron ; Order Dt/Tm:   4/24/2019 1:11:23 PM CDT            Home Meds    fluticasone-salmeterol  :   fluticasone-salmeterol ; Status:   Documented ; Ordered As Mnemonic:   Advair Diskus 100 mcg-50 mcg inhalation powder ; Simple Display Line:   Inhale, bid, 0 Refill(s) ; Catalog Code:   fluticasone-salmeterol ; Order Dt/Tm:   10/10/2019 1:02:51 PM CDT          levothyroxine  :   levothyroxine ; Status:   Documented ; Ordered As Mnemonic:   levothyroxine 75 mcg (0.075 mg) oral tablet ; Simple Display Line:   75 mcg, 1 tab(s), Oral, daily, 0 Refill(s) ; Catalog Code:   levothyroxine ; Order Dt/Tm:   10/10/2019 1:02:01 PM CDT          loratadine  :   loratadine ; Status:   Documented ; Ordered As Mnemonic:   Claritin 10 mg oral tablet ; Simple Display Line:   See Instructions, Up to 4 times daily., 0 Refill(s) ; Catalog Code:   loratadine ; Order Dt/Tm:   10/10/2019 1:02:26 PM CDT          ethinyl estradiol-norgestimate  :   ethinyl estradiol-norgestimate ; Status:   Documented ; Ordered As Mnemonic:   Sprintec 0.25 mg-35 mcg oral tablet ; Simple Display Line:   1 tab(s), Oral, daily, 0 Refill(s) ; Catalog Code:   ethinyl estradiol-norgestimate ; Order Dt/Tm:   9/10/2018 9:34:25 AM CDT            More Vitals   Systolic Blood Pressure Supine :   112 mmHg   Diastolic Blood Pressure Supine :   74 mmHg   Systolic Blood Pressure Sitting :   112 mmHg   Diastolic Blood Pressure Sitting :   75 mmHg   Systolic Blood Pressure Standing :   121 mmHg   Diastolic Blood Pressure Standing :   76 mmHg   Pulse Rate Supine :   77 bpm   Pulse Rate Sitting :   85 bpm   Pulse Rate Standing :   86 bpm   Estela Crabtree CMA - 10/10/2019 1:53 PM CDT

## 2022-02-15 NOTE — TELEPHONE ENCOUNTER
---------------------  From: Deepthi Cormier MD   To: [facility] Message Pool ([id number] - [place]);     Sent: 5/28/2019 2:17:54 PM CDT  Subject: General Message         would you please ask HI to include these results to the [facility] referral at [place].    Results:  Date Result Name Ind Value Ref Range   5/21/2019 4:17 PM T4 Free  1.0 ng/dL (0.8 - 1.4)   5/21/2019 4:17 PM T3 Free ((L)) 2.5 pg/mL (3.0 - 4.7)   5/21/2019 4:17 PM TSH ((H)) 53.01 mIU/L    5/21/2019 4:17 PM Adrenocorticotropic Hormone (ACTH) ((H)) 51 pg/mL (6 - 50)   5/21/2019 4:17 PM Cortisol ((H)) 32.5 mcg/dL    5/21/2019 4:17 PM FSH  5.5 mIU/mL    5/21/2019 4:17 PM Growth Hormone  1.5 ng/mL ( - < OR = 10.1)   5/21/2019 4:17 PM Luteinizing Hormone (LH)  5.6 mIU/mL    5/21/2019 4:17 PM Prolactin Level  22.4 ng/mL---------------------  From: Estela Crabtree CMA ([facility] Message Pool ([id number]))   To: Allyn Diallo;     Sent: 5/28/2019 2:21:05 PM CDT  Subject: FW: General Message---------------------  From: Estela Crabtree CMA ([facility] Message Pool ([id number]))   To: Iva Marshall;     Sent: 5/28/2019 2:22:00 PM CDT  Subject: FW: General Message---------------------  From: Iva Marshall   To: [facility] Message Pool ([id number] [facility] [place]);     Sent: 5/28/2019 3:45:53 PM CDT  Subject: RE: General Message     I am looking into this and will send as soon as I get a fax number to send records to. thanks

## 2022-02-15 NOTE — NURSING NOTE
Comprehensive Intake Entered On:  4/24/2019 12:40 PM CDT    Performed On:  4/24/2019 12:36 PM CDT by Estela Crabtree CMA               Summary   Chief Complaint :   feels naseous after eating present for a few weeks.    Last Menstrual Period :   4/19/2019 CDT   Menstrual Status :   Menarcheal   Weight Measured :   158.6 lb(Converted to: 158 lb 10 oz, 71.94 kg)    Systolic Blood Pressure :   100 mmHg   Diastolic Blood Pressure :   60 mmHg   Mean Arterial Pressure :   73 mmHg   Peripheral Pulse Rate :   84 bpm   BP Site :   Right arm   BP Method :   Manual   HR Method :   Electronic   Temperature Tympanic :   98.5 DegF(Converted to: 36.9 DegC)    Oxygen Saturation :   98 %   Estela Crabtree CMA - 4/24/2019 12:36 PM CDT   Health Status   Allergies Verified? :   Yes   Medication History Verified? :   Yes   Immunizations Current :   No   Pre-Visit Planning Status :   Completed   Tobacco Use? :   Never smoker   Estela Crabtree CMA - 4/24/2019 12:36 PM CDT   Consents   Consent for Immunization Exchange :   Consent Granted   Consent for Immunizations to Providers :   Consent Granted   Estela Crabtree CMA - 4/24/2019 12:36 PM CDT   Meds / Allergies   (As Of: 4/24/2019 12:40:02 PM CDT)   Allergies (Active)   Omnicef  Estimated Onset Date:   Unspecified ; Reactions:   Hives ; Created By:   Isabel Estrada CMA; Reaction Status:   Active ; Category:   Drug ; Substance:   Omnicef ; Type:   Allergy ; Updated By:   Isabel Estrada CMA; Reviewed Date:   4/17/2019 12:32 PM CDT        Medication List   (As Of: 4/24/2019 12:40:02 PM CDT)   Home Meds    ethinyl estradiol-norgestimate  :   ethinyl estradiol-norgestimate ; Status:   Documented ; Ordered As Mnemonic:   Sprintec 0.25 mg-35 mcg oral tablet ; Simple Display Line:   1 tab(s), Oral, daily, 0 Refill(s) ; Catalog Code:   ethinyl estradiol-norgestimate ; Order Dt/Tm:   9/10/2018 9:34:25 AM

## 2022-02-15 NOTE — PROGRESS NOTES
Patient:   OBI BLACKWOOD            MRN: [id number]            FIN: [id number]               Age:   18 years     Sex:  Female     :  2000   Associated Diagnoses:   Vaginal discharge   Author:   Luanne Monique      Chief Complaint   2018 2:42 PM CST   Follow up with vaginitis-switched laundry soap itching and irritation improved for a few days yet now has returned and has white discharge, negative wet prep on 18, has not used any hydrocortisone cream      History of Present Illness   PPC for evaluation of vaginitis.  was seen at local reproductive health clinic about one month ago when symptoms first occured, negative G+C at that time.  seen n clinic 18 by [facility], wet prep negative and told it may be sensivity to laundry detergent which she changed and thought there was improvement for about 2d then symptoms worsened again.  she has hx of negative wet preps in past and has been treated with diflucan regardless and thought symptoms resolved with this treatment, because of this, she picked up miconazole intravaginal cream and treated herself yesterday and early this morning.  she is sexually active one partner at end of October and new partner since  (6d ago).  using non latex condoms, last intercourse 3d ago  shaves pubic region, uses olay body wash         Review of Systems   Constitutional:  Negative.    Ear/Nose/Mouth/Throat:  Negative.    Respiratory:  Negative.    Cardiovascular:  Negative.    Gastrointestinal:  Negative.    Genitourinary:  Negative.    Gynecologic:  Negative except as documented in history of present illness.    Hematology/Lymphatics:  Negative.    Endocrine:  Negative.    Immunologic:  Negative.    Neurologic:  Negative.    Psychiatric:  Negative.       Health Status   Allergies:    Allergic Reactions (Selected)  Severity Not Documented  Omnicef (Hives)   Medications:  (Selected)   Documented Medications  Documented  Sprintec 0.25 mg-35 mcg oral tablet: 1  tab(s), Oral, daily, 0 Refill(s), Type: Maintenance      Physical Examination   Vital Signs   11/23/2018 2:42 PM CST Temperature Tympanic 98.3 DegF    Peripheral Pulse Rate 96 bpm    Pulse Site Radial artery    Systolic Blood Pressure 122 mmHg    Diastolic Blood Pressure 68 mmHg    Mean Arterial Pressure 86 mmHg    BP Site Right arm    Oxygen Saturation 99 %      Eye:  Pupils are equal, round and reactive to light.    HENT:  Normocephalic.    Gastrointestinal:  Soft, Non-tender, Non-distended, Normal bowel sounds.    Genitourinary:  No costovertebral angle tenderness.         Groin/ inguinal region: Bilateral, Within normal limits.         Labia: Bilateral, Majora, Minora, Mass ( left majora at bottom there is a 3mm ingrown hair ).         Vagina: Discharge ( White ).         Cervix: Discharge.         Uterus: Within normal limits.         Ovaries: Bilateral, Within normal limits.         Adnexa: Bilateral, Within normal limits.    Musculoskeletal:  Normal range of motion.    Integumentary:  Warm, Dry, Pink.    Neurologic:  Alert, Oriented, Normal sensory.    Psychiatric:  Cooperative, Appropriate mood & affect, Normal judgment.       Review / Management   Results review:  Lab results   11/23/2018 3:07 PM CST Wet Prep Yeast None Seen    Wet Prep Trichomonas None Seen    Wet Prep Clue Cells None Seen   .       Impression and Plan   Diagnosis     Vaginal discharge ([id number] N89.8).     Patient Instructions:       Counseled: Patient, Regarding diagnosis, Regarding treatment, Regarding medications, Activity, Verbalized understanding.    Summary:  stop using anti-fungal  stop using body wash   stop shaving pubic region  change condom brand  treat vagina tenderly, no aggressive washes or treatments, this seems to be on infectious vaginal inflammation/irritation  if not improving in next 7-14d please RTC.

## 2022-02-15 NOTE — PROGRESS NOTES
Chief Complaint    f/u MRI. Frequent headaches  History of Present Illness      patient present to clinic today for follow up      Since our last visit her abdominal pain has significantly improved.  She has heard from [facility] and they are reviewing her case.  She has had episodes before of symptoms waxing and waning, this was just the longes tit had waxed since her cholecystectomy.  Here with her boyfriend.      Her headaches are worsening, MRI of brain showed a possible lesion on the pituitary.  she has also had some vision changes.      Review of systems is negative except as per HPI including:  no fevers, chills, sore throat, runny nose, nausea, vomiting, constipation, diarrhea, rash or new skin lesions, chest pain, palpitations, slurred speech, new paresthesia, shortness of breath or wheeze.      Exam:      General: alert and oriented ×3 no acute distress.      HEENT: pupils are equal round and reactive to light extraocular motion is intact. Normocephalic and atraumatic.       Hearing is grossly normal and there is no otorrhea.       Nares are patent there is no rhinorrhea.       Mucous membranes are moist and pink.      Chest: has bilateral rise with no increased work of breathing.      Cardiovascular: normal perfusion and brisk capillary refill.      Musculoskeletal: no gross focal abnormalities and normal gait.      Neuro: no gross focal abnormalities and memory seems intact.      Psychiatric: speech is clear and coherent and fluent. Patient dressed appropriately for the weather. Mood is appropriate and affect is full.                     Discussed with patient to return to clinic if symptoms worsen or do not improve  Physical Exam   Vitals & Measurements    T: 97.7   F (Tympanic)  HR: 74(Peripheral)  BP: 110/66  SpO2: 99%     WT: 155.4 lb   Assessment/Plan       Abnormal brain MRI (R90.89)         Ordered:          80917 office outpatient visit 15 minutes (Charge), Quantity: 1, Abnormal brain MRI   Frequent headaches  Chronic abdominal pain  Hx of cholecystectomy          MRI Pituitary w/ Contrast (Request), Priority: Routine, Frequent headaches  Abnormal brain MRI                Chronic abdominal pain (R10.9)         Ordered:          03915 office outpatient visit 15 minutes (Charge), Quantity: 1, Abnormal brain MRI  Frequent headaches  Chronic abdominal pain  Hx of cholecystectomy          XR Abdomen Flat and Upright (Request), Priority: STAT, Abdominal pain                Frequent headaches (R51)         Ordered:          05983 office outpatient visit 15 minutes (Charge), Quantity: 1, Abnormal brain MRI  Frequent headaches  Chronic abdominal pain  Hx of cholecystectomy          MRI Brain w/o Contrast (Request), Paresthesia  Headache          MRI Pituitary w/ Contrast (Request), Priority: Routine, Frequent headaches  Abnormal brain MRI                Hx of cholecystectomy (Z90.49)         Ordered:          18903 office outpatient visit 15 minutes (Charge), Quantity: 1, Abnormal brain MRI  Frequent headaches  Chronic abdominal pain  Hx of cholecystectomy                Orders:         Review Orders for Potential Authorizations, 05/10/19 14:46:05 CDT      patient and I discussed how much of a workup she would like to pursue.  We will start with getting the pituitary MRI and then based on her symptoms, if they progress, and these results we will decide what the next step to take will be.  May consider additonal lab, refer to optho      15 minutes spent with patient in direct face to face contact, > 50% of time spent counseling and coordinating care.   Patient Information     Name:OBI BLACKWOOD      Address:      [address] [place] [id number]     Sex:Female     YOB: 2000     [phone]     Emergency Contact:JESUS BLACKWOOD MARY     MRN:[id number]     FIN:[id number]     Location:[facility]     Date of Service:05/10/2019      Primary Care Physician:        NONE ,       Attending Physician:       [facility], [phone]  Problem List/Past Medical History    Ongoing     Abnormal brain MRI       Comments: convex areaon pituitary seen on brain MRI     Chronic abdominal pain     Eczema     Hx of cholecystectomy     Mild intermittent asthma    Historical     No qualifying data  Procedure/Surgical History     Laparoscopic cholecystectomy (2018)           Extraction of wisdom tooth        Medications     Sprintec 0.25 mg-35 mcg oral tablet: 1 tab(s), Oral, daily, 0 Refill(s).     ondansetron 4 mg oral tablet, disintegratin mg, 1 tab(s), PO, q8 hrs, 10 tab(s), 0 Refill(s).      Allergies    Omnicef (Hives)  Social History    Smoking Status - 05/10/2019     Never smoker     Employment and Education      Part time, Work/School description: Full Time student., 2018     Home and Environment      Marital status: Single. Risks in environment: Unlocked guns, Stairs., 2018     Nutrition and Health      Type of diet: Regular., 2018     Sexual      Sexually active: Yes. Identifies as female, Sexual orientation: Straight or heterosexual. Uses condoms: Yes. Contraceptive Use Details: Birth control pill., 2018     Substance Abuse      Never, 2018     Tobacco      Rarely, 2018  Family History    Cancer: Uncle (M).    Depression: Mother.    Diabetes: Grandmother (P).    Hypertension: Grandfather (P).    Migraine: Mother.    Obese: Grandmother (P).  Immunizations      Vaccine Date Status      human papillomavirus vaccine 2018 Recorded      human papillomavirus vaccine 2017 Recorded      meningococcal conjugate vaccine 2017 Recorded      human papillomavirus vaccine 2017 Recorded      tetanus/diphth/pertuss (Tdap) adult/adol 10/07/2011 Recorded      influenza virus vaccine, inactivated 2009 Recorded      influenza virus vaccine, inactivated 10/31/2008 Recorded      varicella 2008 Recorded       DTaP 08/25/2005 Recorded      MMR (measles/mumps/rubella) 08/25/2005 Recorded      IPV 08/25/2005 Recorded      DTaP 05/10/2001 Recorded      IPV 05/10/2001 Recorded      varicella 11/21/2000 Recorded      Hep B 11/21/2000 Recorded      MMR (measles/mumps/rubella) 11/21/2000 Recorded      DTaP 07/21/2000 Recorded      Hib 07/21/2000 Recorded      DTaP 04/10/2000 Recorded      Hep B-Hib 04/10/2000 Recorded      IPV 04/10/2000 Recorded      DTaP 02/14/2000 Recorded      Hep B-Hib 02/14/2000 Recorded      IPV 02/14/2000 Recorded  Lab Results       Lab Results (Last 4 results within 90 days)        U HCG POC: NEGATIVE [NEGATIVE  - NEGATIVE] (04/17/19 13:02:00)       Sodium Level: 139 mmol/L [135 mmol/L - 146 mmol/L] (04/24/19 13:36:00)       Potassium Level: 4.3 mmol/L [3.8 mmol/L - 5.1 mmol/L] (04/24/19 13:36:00)       Chloride Level: 104 mmol/L [98 mmol/L - 110 mmol/L] (04/24/19 13:36:00)       CO2 Level: 28 mmol/L [20 mmol/L - 32 mmol/L] (04/24/19 13:36:00)       Glucose Level: 99 mg/dL [65 mg/dL - 99 mg/dL] (04/24/19 13:36:00)       BUN: 16 mg/dL [7 mg/dL - 20 mg/dL] (04/24/19 13:36:00)       Creatinine Level: 0.92 mg/dL [0.5 mg/dL - 1 mg/dL] (04/24/19 13:36:00)       BUN/Creat Ratio: NOT APPLICABLE [6  - 22] (04/24/19 13:36:00)       eGFR: 90 mL/min/1.73m2 (04/24/19 13:36:00)       eGFR : 105 mL/min/1.73m2 (04/24/19 13:36:00)       Calcium Level: 9.5 mg/dL [8.9 mg/dL - 10.4 mg/dL] (04/24/19 13:36:00)       Bilirubin Total: 0.4 mg/dL [0.2 mg/dL - 1.1 mg/dL] (04/24/19 13:36:00)       Bilirubin Direct: 0.1 mg/dL (04/24/19 13:36:00)       Bilirubin Indirect: 0.3 [0.2  - 1.1] (04/24/19 13:36:00)       Alkaline Phosphatase: 48 unit/L [47 unit/L - 176 unit/L] (04/24/19 13:36:00)       AST/SGOT: 14 unit/L [12 unit/L - 32 unit/L] (04/24/19 13:36:00)       ALT/SGPT: 15 unit/L [5 unit/L - 32 unit/L] (04/24/19 13:36:00)       Protein Total: 6.9 gm/dL [6.3 gm/dL - 8.2 gm/dL] (04/24/19 13:36:00)       Albumin  Level: 4.4 gm/dL [3.6 gm/dL - 5.1 gm/dL] (04/24/19 13:36:00)       Globulin: 2.5 [2  - 3.8] (04/24/19 13:36:00)       A/G Ratio: 1.8 [1  - 2.5] (04/24/19 13:36:00)       Lipase Level: 26 unit/L [7 unit/L - 60 unit/L] (04/24/19 13:36:00)       WBC: 9.1 [3.8  - 10.8] (04/24/19 13:36:00)       RBC: 4.3 [3.8  - 5.1] (04/24/19 13:36:00)       Hgb: 12.3 gm/dL [11.7 gm/dL - 15.5 gm/dL] (04/24/19 13:36:00)       Hct: 36.2 % [35 % - 45 %] (04/24/19 13:36:00)       MCV: 84.2 fL [80 fL - 100 fL] (04/24/19 13:36:00)       MCH: 28.6 pg [27 pg - 33 pg] (04/24/19 13:36:00)       MCHC: 34 gm/dL [32 gm/dL - 36 gm/dL] (04/24/19 13:36:00)       RDW: 12.2 % [11 % - 15 %] (04/24/19 13:36:00)       Platelet: 320 [140  - 400] (04/24/19 13:36:00)       MPV: 10.4 fL [7.5 fL - 12.5 fL] (04/24/19 13:36:00)       UA pH: 5.5 [5  - 8] (04/17/19 13:02:00)       UA Specific Gravity: 1.025 [1.001  - 1.035] (04/17/19 13:02:00)       UA Glucose: NEGATIVE [NEGATIVE  - NEGATIVE] (04/17/19 13:02:00)       UA Bilirubin: NEGATIVE [NEGATIVE  - NEGATIVE] (04/17/19 13:02:00)       UA Ketones: NEGATIVE [NEGATIVE  - NEGATIVE] (04/17/19 13:02:00)       Urine Occult Blood: NEGATIVE [NEGATIVE  - NEGATIVE] (04/17/19 13:02:00)       UA Protein: NEGATIVE [NEGATIVE  - NEGATIVE] (04/17/19 13:02:00)       UA Nitrite: NEGATIVE [NEGATIVE  - NEGATIVE] (04/17/19 13:02:00)       UA Leukocyte Esterase: NEGATIVE [NEGATIVE  - NEGATIVE] (04/17/19 13:02:00)       Culture Urine: See comment (04/17/19 13:15:00)       Wet Prep Yeast: Present (02/28/19 13:22:00)       Wet Prep Trichomonas: None Seen (02/28/19 13:22:00)       Wet Prep Clue Cells: None Seen (02/28/19 13:22:00)

## 2022-02-15 NOTE — TELEPHONE ENCOUNTER
Patient Information     Name:OBI BLACKWOOD      Address:      [address] [place], [place] [address] [place] [phone]     Sex:Female     YOB: 2000     [phone]     Emergency Contact:JESUS BLACKWOOD     MRN:[id number]     FIN:[id number]     Location:[facility]     Date of Service:04/07/2021      Primary Care Physician:       NONE ,       Attending Physician:       Bobbi MORGAN, Justina HARRIS, [phone]   pt notified by telephone of negative GC/chlamydia test results.   No oral lesions; no gross abnormalities

## 2022-02-15 NOTE — NURSING NOTE
Comprehensive Intake Entered On:  10/23/2019 11:26 AM CDT    Performed On:  10/23/2019 11:23 AM CDT by Candy Alcantar CMA               Summary   Chief Complaint :   c/o abdominal pain with vomiting. Started this morning. Stomach did feel off yesterday. Took some zofran without relief   Menstrual Status :   Menarcheal   Ht/Wt Measurement Refused by Patient? :   No   Systolic Blood Pressure :   118 mmHg   Diastolic Blood Pressure :   64 mmHg   Mean Arterial Pressure :   82 mmHg   Peripheral Pulse Rate :   72 bpm   Temperature Tympanic :   98.9 DegF(Converted to: 37.2 DegC)    Candy Alcantar CMA - 10/23/2019 11:23 AM CDT   Health Status   Allergies Verified? :   Yes   Medication History Verified? :   Yes   Immunizations Current :   No   Pre-Visit Planning Status :   Completed   Tobacco Use? :   Never smoker   Candy Alcantar CMA - 10/23/2019 11:23 AM CDT   Consents   Consent for Immunization Exchange :   Consent Granted   Consent for Immunizations to Providers :   Consent Granted   Candy Alcantar CMA - 10/23/2019 11:23 AM CDT   Meds / Allergies   (As Of: 10/23/2019 11:26:43 AM CDT)   Allergies (Active)   Dust Mite  Estimated Onset Date:   Unspecified ; Created By:   Celsa Duff; Reaction Status:   Active ; Category:   Drug ; Substance:   Dust Mite ; Type:   Allergy ; Updated By:   Celsa Duff; Reviewed Date:   10/10/2019 1:02 PM CDT      Omnicef  Estimated Onset Date:   Unspecified ; Reactions:   Hives ; Created By:   Isabel Estrada CMA; Reaction Status:   Active ; Category:   Drug ; Substance:   Omnicef ; Type:   Allergy ; Updated By:   Isabel Estrada CMA; Reviewed Date:   10/10/2019 1:02 PM CDT        Medication List   (As Of: 10/23/2019 11:26:43 AM CDT)   Prescription/Discharge Order    ondansetron  :   ondansetron ; Status:   Prescribed ; Ordered As Mnemonic:   ondansetron 4 mg oral tablet, disintegrating ; Simple Display Line:   4 mg, 1 tab(s), Oral, q8  hrs, 10 tab(s), 1 Refill(s) ; Ordering Provider:   Deepthi Cormier MD; Catalog Code:   ondansetron ; Order Dt/Tm:   10/10/2019 2:29:21 PM CDT            Home Meds    ethinyl estradiol-norgestimate  :   ethinyl estradiol-norgestimate ; Status:   Documented ; Ordered As Mnemonic:   Sprintec 0.25 mg-35 mcg oral tablet ; Simple Display Line:   1 tab(s), Oral, daily, 0 Refill(s) ; Catalog Code:   ethinyl estradiol-norgestimate ; Order Dt/Tm:   9/10/2018 9:34:25 AM CDT          fluticasone-salmeterol  :   fluticasone-salmeterol ; Status:   Discontinued ; Ordered As Mnemonic:   Advair Diskus 100 mcg-50 mcg inhalation powder ; Simple Display Line:   Inhale, bid, 0 Refill(s) ; Catalog Code:   fluticasone-salmeterol ; Order Dt/Tm:   10/10/2019 1:02:51 PM CDT          fluticasone-salmeterol  :   fluticasone-salmeterol ; Status:   Documented ; Ordered As Mnemonic:   Advair HFA 45 mcg-21 mcg/inh inhalation aerosol ; Simple Display Line:   2 puff(s), Inhale, bid, 0 Refill(s) ; Catalog Code:   fluticasone-salmeterol ; Order Dt/Tm:   10/23/2019 11:24:30 AM CDT          levothyroxine  :   levothyroxine ; Status:   Documented ; Ordered As Mnemonic:   levothyroxine 75 mcg (0.075 mg) oral tablet ; Simple Display Line:   75 mcg, 1 tab(s), Oral, daily, 0 Refill(s) ; Catalog Code:   levothyroxine ; Order Dt/Tm:   10/10/2019 1:02:01 PM CDT          loratadine  :   loratadine ; Status:   Documented ; Ordered As Mnemonic:   Claritin 10 mg oral tablet ; Simple Display Line:   See Instructions, Up to 4 times daily., 0 Refill(s) ; Catalog Code:   loratadine ; Order Dt/Tm:   10/10/2019 1:02:26 PM CDT

## 2022-02-15 NOTE — TELEPHONE ENCOUNTER
---------------------  From: Aníbal Luna CMA ([facility] Message Pool ([id number]))   To: Appointment Pool ([id number]);     Sent: 1/11/2021 11:16:23 AM CST !  Subject: COVID testing     Please add pt to curbside schedule for Covid AND strep testing per [facility] CMA---------------------  From: Madiha Francois (Appointment Pool ([id number]))   To: [facility] Message Pool ([id number]);     Sent: 1/11/2021 11:22:11 AM CST  Subject: RE: COVID testing     pt scheduled for tomorrow

## 2022-02-15 NOTE — NURSING NOTE
Comprehensive Intake Entered On:  4/17/2019 12:32 PM CDT    Performed On:  4/17/2019 12:25 PM CDT by Pamela Chambers               Summary   Chief Complaint :   c/o ovarian cyst rupture 2 weeks ago, states her urine has been cloudy since. States they did a UA when she was seen and did a culture but believes culture came back normal. was seen in [place].    Last Menstrual Period :   3/19/2019 CDT   Menstrual Status :   Menarcheal   Weight Measured :   160.0 lb(Converted to: 160 lb 0 oz, 72.57 kg)    Height Measured :   62.75 in(Converted to: 5 ft 3 in, 159.38 cm)    Body Mass Index :   28.57 kg/m2   Body Surface Area :   1.79 m2   Systolic Blood Pressure :   122 mmHg   Diastolic Blood Pressure :   60 mmHg   Mean Arterial Pressure :   81 mmHg   Peripheral Pulse Rate :   80 bpm   BP Site :   Right arm   Pulse Site :   Radial artery   BP Method :   Manual   HR Method :   Manual   Temperature Tympanic :   98.8 DegF(Converted to: 37.1 DegC)    Pamela Chambers - 4/17/2019 12:25 PM CDT   Health Status   Allergies Verified? :   Yes   Medication History Verified? :   Yes   Immunizations Current :   No   Medical History Verified? :   Yes   Pre-Visit Planning Status :   Completed   Tobacco Use? :   Never smoker   Pamela Chambers - 4/17/2019 12:25 PM CDT   Consents   Consent for Immunization Exchange :   Consent Granted   Consent for Immunizations to Providers :   Consent Granted   Pamela Chambers - 4/17/2019 12:25 PM CDT   Meds / Allergies   (As Of: 4/17/2019 12:32:40 PM CDT)   Allergies (Active)   Omnicef  Estimated Onset Date:   Unspecified ; Reactions:   Hives ; Created By:   Isabel Estrada CMA; Reaction Status:   Active ; Category:   Drug ; Substance:   Omnicef ; Type:   Allergy ; Updated By:   Isabel Estrada CMA; Reviewed Date:   4/17/2019 12:32 PM CDT        Medication List   (As Of: 4/17/2019 12:32:40 PM CDT)   Prescription/Discharge Order    fluconazole  :   fluconazole ; Status:   Processing ; Ordered  As Mnemonic:   Diflucan 150 mg oral tablet ; Ordering Provider:   Luanne Monique; Action Display:   Complete ; Catalog Code:   fluconazole ; Order Dt/Tm:   4/17/2019 12:32:35 PM            Home Meds    ethinyl estradiol-norgestimate  :   ethinyl estradiol-norgestimate ; Status:   Documented ; Ordered As Mnemonic:   Sprintec 0.25 mg-35 mcg oral tablet ; Simple Display Line:   1 tab(s), Oral, daily, 0 Refill(s) ; Catalog Code:   ethinyl estradiol-norgestimate ; Order Dt/Tm:   9/10/2018 9:34:25 AM

## 2022-02-15 NOTE — PROGRESS NOTES
Chief Complaint    pain rlq has gotten worse, did vomit once last night  History of Present Illness      Chief complaint as above reviewed and confirmed with patient.  Pt presents to the clinic with concerns re: abdominal pain.  She was seen yesterday for nausea and vomiting and that note reviewed.  In short, pt developed nausea and emsis while out eating Mexican 3 days ago.   She felt somewhat better the next day but that night worsened again and had nausea, malaise, low appitite and a few bouts of emesis.  was seen yesterday and states prior to being seen did not have any abdominal pain.  When she was examined she recalled having RLQ abdominal pain and states that it has persisted and worsened though the course of yesterday and into last night.  She went to [facility] urgent care and they recommended that she be seen in the ED but she decided to wait until morning to be seen.  She had RLQ through the night that kept her up, nausea, anorexia and emesis x 1.  she did drink some sips of water this am and a little machelle star crunch snack in the dorm room this am.  no fevers but states she normaly runs 97 temperature and at home has been 98-99 which is higher than normal for her.  she does have some urgency and frequency of urination, mild R low back ache. no dysuria. no hematuria. no history of kidney stones. LMP last week.  normal.  uses OCPs and condoms.  no concern for STI  no vaginal itching, irritation or change of discharge.  denies concern for pregnancy.       Had nomal CBC yesterday and BMP.  Review of Systems      Review of systems is negative with the exception of those noted in HPI          Physical Exam   Vitals & Measurements    T: 98.3   F (Tympanic)  HR: 73(Peripheral)  BP: 117/75     WT: 138 lb       Pt is in no acute distress.  Appears well.      MM moist, skin turger good.      Lungs CTA      Heart RRR      Abdomen: BS active, soft, non-distended.  tender deep palpation RLQ. no rebound or peritoneal  signs. no masses or hsm.  mild R CVAT.         UA: unremarkable      UPT: negative       CBC with Differential : WNL      CT scan of abdomen and pelvis: mild amount of free fluid in pelvis, otherwise unremarkable, no obstruction, no appendicitis.  normal kidneys and ureters, no hydronephrosis                               Assessment/Plan       RLQ abdominal pain (R10.31)         reassured pt, this is likely viral syndrome.  may have had an ovarian cyst accounting for discomfort.  push fluids, rest, zofran for nausea.  fu for persistent or worsening sx.  Pt agreeable.         Orders:          CBC w/ Diff/Plt (Request), Priority: Urgent, RLQ abdominal pain          CT Abdomen and Pelvis w/contrast (Request), Priority: STAT, Instructions: IV CONTRAST (no oral) r/o appendicitis, RLQ abdominal pain          POC HCG, URINE* ([facility]), Specimen Type: Urine, Collection Date: 09/11/18 11:21:00 CDT          POC URINALYSIS, UA* ([facility]), Specimen Type: Urine, Collection Date: 09/11/18 11:21:00 CDT                Orders:         ondansetron, = 1 tab(s) ( 4 mg ), PO, q8 hrs, PRN: nausea, # 10 tab(s), 1 Refill(s), Type: Maintenance, Pharmacy: [email or web address] [id number], 1 tab(s) Oral q8 hrs,PRN:nausea, (Ordered)  Patient Information     Name:OBI BLACKWOOD      Address:      [address] [place] [id number]     Sex:Female     YOB: 2000     [phone]     Emergency Contact:JESUS BLACKWOOD     MRN:[id number]     FIN:[id number]     Location:[facility]     Date of Service:09/11/2018      Primary Care Physician:       NONE ,       Attending Physician:       Bobbi MORGAN, Justina HARRIS, [phone]  Problem List/Past Medical History    Ongoing     No qualifying data    Historical     No qualifying data  Medications     Sprintec 0.25 mg-35 mcg oral tablet: 1 tab(s), Oral, daily, 0 Refill(s).     Zofran 4 mg oral tablet: 4 mg, 1 tab(s), PO, q8 hrs, PRN: nausea, 10  tab(s), 1 Refill(s).          Allergies    Omnicef (Hives)  Social History    Smoking Status - 09/10/2018     Never smoker  Lab Results       Lab Results (Last 4 results within 90 days)        U HCG POC: NEGATIVE [21 mmol/L - 31 mmol/L] (09/11/18 11:44:00 CDT)       Sodium Level: 141 [135 mmol/L - 145 mmol/L] (09/10/18 10:24:00 CDT)       Potassium Level: 4.0 [3.5 mmol/L - 5 mmol/L] (09/10/18 10:24:00 CDT)       Chloride Level: 108 [98 mmol/L - 110 mmol/L] (09/10/18 10:24:00 CDT)       CO2 Level: 25 [21 mmol/L - 31 mmol/L] (09/10/18 10:24:00 CDT)       AGAP: 8 [5  - 18] (09/10/18 10:24:00 CDT)       Glucose Level: 97 [65 mg/dL - 100 mg/dL] (09/10/18 10:24:00 CDT)       BUN: 8 [8 mg/dL - 25 mg/dL] (09/10/18 10:24:00 CDT)       Creatinine Level: 0.87 [0.57 mg/dL - 1.11 mg/dL] (09/10/18 10:24:00 CDT)       BUN/Creat Ratio: 9 Low [10  - 20] (09/10/18 10:24:00 CDT)       eGFR : >60 [10  - 20] (09/10/18 10:24:00 CDT)       eGFR Non-: >60 [10  - 20] (09/10/18 10:24:00 CDT)       Calcium Level: 9.5 [8.5 mg/dL - 10.5 mg/dL] (09/10/18 10:24:00 CDT)       WBC: 8.4 [4.5  - 13] (09/11/18 11:47:00 CDT)       WBC: 6.0 [4.5  - 13] (09/10/18 10:24:00 CDT)       RBC: 4.34 [4.1  - 5.1] (09/11/18 11:47:00 CDT)       RBC: 4.30 [4.1  - 5.1] (09/10/18 10:24:00 CDT)       Hgb: 11.9 Low [12 g/dL - 16 g/dL] (09/11/18 11:47:00 CDT)       Hgb: 11.7 Low [12 g/dL - 16 g/dL] (09/10/18 10:24:00 CDT)       Hct: 35.4 [33 % - 51 %] (09/11/18 11:47:00 CDT)       Hct: 35.2 [33 % - 51 %] (09/10/18 10:24:00 CDT)       MCV: 82 [78 fL - 102 fL] (09/11/18 11:47:00 CDT)       MCV: 82 [78 fL - 102 fL] (09/10/18 10:24:00 CDT)       MCH: 27.4 [25 pg - 35 pg] (09/11/18 11:47:00 CDT)       MCH: 27.2 [25 pg - 35 pg] (09/10/18 10:24:00 CDT)       MCHC: 33.6 [32 g/dL - 36 g/dL] (09/11/18 11:47:00 CDT)       MCHC: 33.2 [32 g/dL - 36 g/dL] (09/10/18 10:24:00 CDT)       RDW: 13.1 [11.5 % - 15.5 %] (09/11/18 11:47:00 CDT)       RDW: 13.0  [11.5 % - 15.5 %] (09/10/18 10:24:00 CDT)       Platelet: 316 [140  - 440] (09/11/18 11:47:00 CDT)       Platelet: 282 [140  - 440] (09/10/18 10:24:00 CDT)       MPV: 10.0 [6.5 fL - 11 fL] (09/11/18 11:47:00 CDT)       MPV: 9.9 [6.5 fL - 11 fL] (09/10/18 10:24:00 CDT)       Lymphocytes: 27.1 [8.5 mg/dL - 10.5 mg/dL] (09/11/18 11:47:00 CDT)       Lymphocytes: 31.2 [8.5 mg/dL - 10.5 mg/dL] (09/10/18 10:24:00 CDT)       Abs Lymphocytes: 2.3 [1.2  - 6.5] (09/11/18 11:47:00 CDT)       Abs Lymphocytes: 1.9 [1.2  - 6.5] (09/10/18 10:24:00 CDT)       Neutrophils: 61.4 [1.2  - 6.5] (09/11/18 11:47:00 CDT)       Neutrophils: 54.0 [1.2  - 6.5] (09/10/18 10:24:00 CDT)       Abs Neutrophils: 5.2 [1.5  - 8] (09/11/18 11:47:00 CDT)       Abs Neutrophils: 3.3 [1.5  - 8] (09/10/18 10:24:00 CDT)       Monocytes: 8.6 [8.5 mg/dL - 10.5 mg/dL] (09/11/18 11:47:00 CDT)       Monocytes: 9.1 [8.5 mg/dL - 10.5 mg/dL] (09/10/18 10:24:00 CDT)       Abs Monocytes: 0.7 [1.2  - 6.5] (09/11/18 11:47:00 CDT)       Abs Monocytes: 0.6 [1.2  - 6.5] (09/10/18 10:24:00 CDT)       Eosinophils: 2.5 [8.5 mg/dL - 10.5 mg/dL] (09/11/18 11:47:00 CDT)       Eosinophils: 5.0 [8.5 mg/dL - 10.5 mg/dL] (09/10/18 10:24:00 CDT)       Abs Eosinophils: 0.2 [1.2  - 6.5] (09/11/18 11:47:00 CDT)       Abs Eosinophils: 0.3 [1.2  - 6.5] (09/10/18 10:24:00 CDT)       Basophils: 0.4 [8.5 mg/dL - 10.5 mg/dL] (09/11/18 11:47:00 CDT)       Basophils: 0.7 [8.5 mg/dL - 10.5 mg/dL] (09/10/18 10:24:00 CDT)       Abs Basophils: 0.0 [1.2  - 6.5] (09/11/18 11:47:00 CDT)       Abs Basophils: 0.0 [1.2  - 6.5] (09/10/18 10:24:00 CDT)       UA pH: 6 [5  - 8] (09/11/18 11:44:00 CDT)       UA Specific Gravity: 1.025 [1.001  - 1.035] (09/11/18 11:44:00 CDT)       UA Glucose: NEGATIVE [0.57 mg/dL - 1.11 mg/dL] (09/11/18 11:44:00 CDT)       UA Bilirubin: NEGATIVE [0.57 mg/dL - 1.11 mg/dL] (09/11/18 11:44:00 CDT)       UA Ketones: NEGATIVE [0.57 mg/dL - 1.11 mg/dL] (09/11/18 11:44:00 CDT)        Urine Occult Blood: NEGATIVE [0.57 mg/dL - 1.11 mg/dL] (09/11/18 11:44:00 CDT)       UA Protein: NEGATIVE [0.57 mg/dL - 1.11 mg/dL] (09/11/18 11:44:00 CDT)       UA Nitrite: NEGATIVE [0.57 mg/dL - 1.11 mg/dL] (09/11/18 11:44:00 CDT)       UA Leukocyte Esterase: NEGATIVE [0.57 mg/dL - 1.11 mg/dL] (09/11/18 11:44:00 CDT)

## 2022-02-15 NOTE — LETTER
(Inserted Image. Unable to display)                                                                                                [address] [facility] [address]                                                May 24, 2019Re: OBI BLACKWOOD2000Tatianna Lopez [facility] [address] [place], [place] [address]Hg: Dr. LopezThe following patient has been referred to your office/practice:  OBI BLACKWOOD Appointment:  Appointment is PendingPlease refer to the attached  clinical documentation for a summary of OBI's care.  Please do not hesitate to contact our office if any additional clinical questions arise.  All relevant records and transition of care documents should be mailed or faxed.Your assistance in providing continuity of care is appreciated. Sincerely, [facility] [place] [place] [address](P) [phone](F) [phone]

## 2022-02-15 NOTE — NURSING NOTE
Comprehensive Intake Entered On:  1/3/2019 1:03 PM CST    Performed On:  1/3/2019 12:59 PM CST by Lori Philip LPN               Summary   Chief Complaint :   Patient is here for f/u on concussion. States small improvement.    Menstrual Status :   Menarcheal   Weight Measured :   151.4 lb(Converted to: 151 lb 6 oz, 68.67 kg)    Systolic Blood Pressure :   124 mmHg   Diastolic Blood Pressure :   78 mmHg   Mean Arterial Pressure :   93 mmHg   Peripheral Pulse Rate :   92 bpm   BP Site :   Right arm   Pulse Site :   Radial artery   BP Method :   Manual   HR Method :   Manual   Temperature Tympanic :   98.5 DegF(Converted to: 36.9 DegC)    Lori Philip LPN - 1/3/2019 12:59 PM CST   Health Status   Allergies Verified? :   Yes   Medication History Verified? :   Yes   Immunizations Current :   No   Pre-Visit Planning Status :   Completed   Tobacco Use? :   Never smoker   Lori hPilip LPN - 1/3/2019 12:59 PM CST   Consents   Consent for Immunization Exchange :   Consent Granted   Consent for Immunizations to Providers :   Consent Granted   Lori Philip LPN - 1/3/2019 12:59 PM CST   Meds / Allergies   (As Of: 1/3/2019 1:03:36 PM CST)   Allergies (Active)   Omnicef  Estimated Onset Date:   Unspecified ; Reactions:   Hives ; Created By:   Isabel Estrada CMA; Reaction Status:   Active ; Category:   Drug ; Substance:   Omnicef ; Type:   Allergy ; Updated By:   Isabel Estrada CMA; Reviewed Date:   1/3/2019 1:01 PM CST        Medication List   (As Of: 1/3/2019 1:03:36 PM CST)   Prescription/Discharge Order    cyclobenzaprine  :   cyclobenzaprine ; Status:   Prescribed ; Ordered As Mnemonic:   cyclobenzaprine 10 mg oral tablet ; Simple Display Line:   10 mg, 1 tab(s), PO, TID, PRN: for spasm, 30 tab(s), 0 Refill(s) ; Ordering Provider:   Teo Hernandes PA-C; Catalog Code:   cyclobenzaprine ; Order Dt/Tm:   12/1/2018 9:29:22 AM            Home Meds    ethinyl estradiol-norgestimate  :   ethinyl estradiol-norgestimate ; Status:    Documented ; Ordered As Mnemonic:   Sprintec 0.25 mg-35 mcg oral tablet ; Simple Display Line:   1 tab(s), Oral, daily, 0 Refill(s) ; Catalog Code:   ethinyl estradiol-norgestimate ; Order Dt/Tm:   9/10/2018 9:34:25 AM

## 2022-02-15 NOTE — PROGRESS NOTES
Chief Complaint    Pt here today nausea and vomiting. Temp is  between 97-99.4  History of Present Illness      Patient reports that for the past 4 days she has had some nausea and vomiting.  Yesterday she had 2 loose watery brown stools.  She has been able to keep some fluids down.  She is getting a little bit lightheaded when she stands.  She thinks that she might have some decreased urine output.      Orthostats today in clinic are negative.  Her labs look great.      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      No melena, no hematochezia, no coffee-ground emesis,  Review of Systems      12 point review of systems is negative except as per HPI  Physical Exam   Vitals & Measurements    HR: 86(Peripheral)  BP: 92/70  BP: 112/75(Sitting)  BP: 121/76(Standing)  BP: 112/74(Supine)  SpO2: 98%     HT: 62.75 in  WT: 155.2 lb  BMI: 27.71       General alert and oriented x3 in mild to moderate distress secondary to nausea.  She appears pale and mildly ill-appearing.      HEENT pupils equally round reactive to light, mucous membranes are moist and pink, sclerae anicteric, conjunctiva is not injected,       Neck is supple there is no lymphadenopathy in her anterior posterior cervical chains        Chest is clear to auscultation bilaterally without wheezes rhonchi or rails and no increased work of breathing        Cardiovascular regular rate and rhythm no murmurs gallops or rubs normal tissue perfusion        Abdomen is soft there is normoactive bowel sounds nontender there is no rebound or guarding.  Assessment/Plan       Nausea (R11.0)         Explained to patient that she might have a viral gastroenteritis but that her labs and exam look very reassuring.  Would like her to use the ondansetron which has helped with her nausea little bit here in clinic today so that she can stay hydrated.  We will give  her a note for work but says that she can return to work in 1 week but sooner if her symptoms allow it.  Would like patient to return to clinic if her symptoms worsen or do not improve.         Ordered:          ondansetron, = 1 tab(s) ( 4 mg ), Oral, q8 hrs, # 10 tab(s), 1 Refill(s), Type: Maintenance, Pharmacy: [id number], 1 tab(s) Oral q8 hrs, (Ordered)          ondansetron, = 1 tab(s) ( 4 mg ), PO, q8 hrs, # 10 tab(s), 0 Refill(s), Type: Maintenance, Pharmacy: [facility] [id number], 1 tab(s) Oral q8 hrs, (Completed)          Basic Metabolic Panel (Request), Priority: Urgent, Nausea          CBC w/ Diff/Plt (Request), Priority: Urgent, Nausea           Patient Information     Name:OBI BLACKWOOD      Address:      [facility] [address] [id number]     Sex:Female     YOB: 2000     [phone]     Emergency Contact:JESUS BLACKWOOD     MRN:[id number]     FIN:[id number]     Location:[facility]     Date of Service:10/10/2019      Primary Care Physician:       NONE ,       Attending Physician:       Casa COLBERT [facility], [phone]  Problem List/Past Medical History    Ongoing     Abnormal brain MRI       Comments: convex areaon pituitary seen on brain MRI     Chronic abdominal pain     Eczema     Hx of cholecystectomy     Mild intermittent asthma     Pituitary adenoma    Historical     No qualifying data  Procedure/Surgical History     Laparoscopic cholecystectomy (02/21/2018)     Extraction of wisdom tooth  Medications    Advair Diskus 100 mcg-50 mcg inhalation powder, Inhale, bid    Claritin 10 mg oral tablet, See Instructions    levothyroxine 75 mcg (0.075 mg) oral tablet, 75 mcg= 1 tab(s), Oral, daily    ondansetron 4 mg oral tablet, disintegrating, 4 mg= 1 tab(s), Oral, q8 hrs, 1 refills    Sprintec 0.25 mg-35 mcg oral tablet, 1 tab(s), Oral, daily  Allergies    Dust Mite    Omnicef (Hives)  Social History     Smoking Status - 05/10/2019     Never smoker     Employment/School      Part time, Work/School description: Full Time student., 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 Risks in environment: Unlocked guns, Stairs., 09/24/2018     Nutrition/Health      Type of diet: Regular., 09/24/2018     Sexual      Sexually active: Yes. Identifies as female, Sexual orientation: Straight or heterosexual. Uses condoms: Yes. Contraceptive Use Details: Birth control pill., 09/24/2018     Substance Abuse      Never, 09/24/2018     Tobacco      Rarely, 09/24/2018  Family History    Cancer: Uncle (M).    Depression: Mother.    Diabetes: Grandmother (P).    Hypertension: Grandfather (P).    Migraine: Mother.    Obese: Grandmother (P).  Immunizations      Vaccine Date Status      human papillomavirus vaccine 03/20/2018 Recorded      human papillomavirus vaccine 07/24/2017 Recorded      meningococcal conjugate vaccine 03/13/2017 Recorded      human papillomavirus vaccine 03/13/2017 Recorded      tetanus/diphth/pertuss (Tdap) adult/adol 10/07/2011 Recorded      influenza virus vaccine, inactivated 12/21/2009 Recorded      influenza virus vaccine, inactivated 10/31/2008 Recorded      varicella 08/29/2008 Recorded      DTaP 08/25/2005 Recorded      MMR (measles/mumps/rubella) 08/25/2005 Recorded      IPV 08/25/2005 Recorded      DTaP 05/10/2001 Recorded      IPV 05/10/2001 Recorded      varicella 11/21/2000 Recorded      Hep B 11/21/2000 Recorded      MMR (measles/mumps/rubella) 11/21/2000 Recorded      DTaP 07/21/2000 Recorded      Hib 07/21/2000 Recorded      DTaP 04/10/2000 Recorded      Hep B-Hib 04/10/2000 Recorded      IPV 04/10/2000 Recorded      DTaP 02/14/2000 Recorded      Hep B-Hib 02/14/2000 Recorded      IPV 02/14/2000 Recorded  Lab Results          Lab Results (Last 4 results within 90 days)           Sodium Level: 139 [135 mmol/L - 145 mmol/L] (10/10/19 13:23:00)          Potassium Level: 4.0 [3.5 mmol/L - 5  mmol/L] (10/10/19 13:23:00)          Chloride Level: 107 [98 mmol/L - 110 mmol/L] (10/10/19 13:23:00)          CO2 Level: 23 [21 mmol/L - 31 mmol/L] (10/10/19 13:23:00)          AGAP: 9 [5  - 18] (10/10/19 13:23:00)          Glucose Level: 106 High [65 mg/dL - 100 mg/dL] (10/10/19 13:23:00)          BUN: 12 [8 mg/dL - 25 mg/dL] (10/10/19 13:23:00)          Creatinine Level: 0.89 [0.57 mg/dL - 1.11 mg/dL] (10/10/19 13:23:00)          BUN/Creat Ratio: 13 [10  - 20] (10/10/19 13:23:00)          eGFR : >60 (10/10/19 13:23:00)          eGFR Non-: >60 (10/10/19 13:23:00)          Calcium Level: 10.3 [8.5 mg/dL - 10.5 mg/dL] (10/10/19 13:23:00)          WBC: 7.6 [4.5  - 11] (10/10/19 13:23:00)          RBC: 4.37 [4  - 5.2] (10/10/19 13:23:00)          Hgb: 12.3 [12 g/dL - 16 g/dL] (10/10/19 13:23:00)          Hct: 35.7 [33 % - 51 %] (10/10/19 13:23:00)          MCV: 82 [80 fL - 100 fL] (10/10/19 13:23:00)          MCH: 28.1 [26 pg - 34 pg] (10/10/19 13:23:00)          MCHC: 34.5 [32 g/dL - 36 g/dL] (10/10/19 13:23:00)          RDW: 12.5 [11.5 % - 15.5 %] (10/10/19 13:23:00)          Platelet: 281 [140  - 440] (10/10/19 13:23:00)          MPV: 9.7 [6.5 fL - 11 fL] (10/10/19 13:23:00)          Lymphocytes: 24.5 (10/10/19 13:23:00)          Abs Lymphocytes: 1.9 [0.9  - 2.9] (10/10/19 13:23:00)          Neutrophils: 64.1 (10/10/19 13:23:00)          Abs Neutrophils: 4.9 [1.7  - 7] (10/10/19 13:23:00)          Monocytes: 7.9 (10/10/19 13:23:00)          Abs Monocytes: 0.6 (10/10/19 13:23:00)          Eosinophils: 3.1 (10/10/19 13:23:00)          Abs Eosinophils: 0.2 (10/10/19 13:23:00)          Basophils: 0.4 (10/10/19 13:23:00)          Abs Basophils: 0.0 (10/10/19 13:23:00)

## 2022-02-15 NOTE — NURSING NOTE
I called pt and informed her that rapid strep test done at [facility] today was negative per [facility].  I informed her that strep cx results will take 48-72 hrs and covid results will take 24-48 hrs.  Pt voiced understanding of this message.  [phone]  Aníbal Luna CMA

## 2022-02-15 NOTE — PROGRESS NOTES
Patient:   OBI BLACKWOOD            MRN: [id number]            FIN: [id number]               Age:   18 years     Sex:  Female     :  2000   Associated Diagnoses:   Vaginal discomfort; Vaginitis   Author:   Luanne Monique      Chief Complaint   2019 12:58 PM CST   c/o vaginal irritation after switching tampon brands Sat/Sun      History of Present Illness   PPC for vaginitis symptoms, itching for past 72 hours after changing tampon brands  is sexually active, monogamous relationship for 5 months, both had negative STD testing prior to starting relationship  no vaginal lesions, on Sprintec.  not diabetic      Review of Systems   Constitutional:  Negative.    Ear/Nose/Mouth/Throat:  Negative.    Respiratory:  Negative.    Cardiovascular:  Negative.    Gastrointestinal:  Negative.    Genitourinary:  Negative.    Gynecologic:  Negative except as documented in history of present illness.    Hematology/Lymphatics:  Negative.    Endocrine:  Negative.    Immunologic:  Negative.    Neurologic:  Negative.    Psychiatric:  Negative.       Health Status   Allergies:    Allergic Reactions (Selected)  Severity Not Documented  Omnicef (Hives)   Medications:  (Selected)   Documented Medications  Documented  Sprintec 0.25 mg-35 mcg oral tablet: 1 tab(s), Oral, daily, 0 Refill(s), Type: Maintenance      Physical Examination   Last Menstrual Period: 2019   Vital Signs   2019 12:58 PM CST Temperature Tympanic 98.2 DegF    Peripheral Pulse Rate 68 bpm    Pulse Site Radial artery    HR Method Manual    Systolic Blood Pressure 118 mmHg    Diastolic Blood Pressure 70 mmHg    Mean Arterial Pressure 86 mmHg    BP Site Right arm    BP Method Manual      Eye:  Pupils are equal, round and reactive to light.    HENT:  Normocephalic.    Gastrointestinal:  Soft, Non-tender, Non-distended, Normal bowel sounds.    Genitourinary:  No costovertebral angle tenderness.         Groin/ inguinal region: Bilateral, Within normal  limits.         Labia: Bilateral, Majora, Minora, Within normal limits.         Vagina: Discharge ( White ).         Cervix: Discharge.         Uterus: Within normal limits.         Ovaries: Bilateral, Within normal limits.         Adnexa: Bilateral, Within normal limits.    Musculoskeletal:  Normal range of motion.    Integumentary:  Warm, Dry, Pink.    Neurologic:  Alert, Oriented.    Psychiatric:  Cooperative.       Review / Management   Results review:  Lab results   2/28/2019 1:22 PM CST Wet Prep Yeast Present    Wet Prep Trichomonas None Seen    Wet Prep Clue Cells None Seen   .       Impression and Plan   Diagnosis     Vaginal discomfort (CVS51-DD N94.9).     Vaginitis (CNY06-YZ N76.0).     Patient Instructions:       Counseled: Patient, Regarding diagnosis, Regarding treatment, Regarding medications, Activity, Verbalized understanding.    Summary:  discussed treatment and potential side effects of medication.    Orders     Orders (Selected)   Prescriptions  Prescribed  Diflucan 150 mg oral tablet: = 1 tab(s) ( 150 mg ), PO, Once, # 1 tab(s), 0 Refill(s), Type: Soft Stop, [id number], 1 tab(s) Oral once.

## 2022-02-15 NOTE — NURSING NOTE
Comprehensive Intake Entered On:  2021 11:11 AM CDT    Performed On:  2021 11:05 AM CDT by Isabel Estrada CMA               Summary   Chief Complaint :   Nini presents for follow-up BV a couple week- increase discharge color is pink/gray some chunks, itchiness in genitle area x ongoing with some improvement noted than got worse about a week ago.   Last Menstrual Period :   2021 CDT   Menstrual Status :   Menarcheal   Weight Measured :   166.4 lb(Converted to: 166 lb 6 oz, 75.478 kg)    Height Measured :   63 in(Converted to: 5 ft 3 in, 160.02 cm)    Body Mass Index :   29.47 kg/m2 (HI)    Body Surface Area :   1.83 m2   Systolic Blood Pressure :   112 mmHg   Diastolic Blood Pressure :   66 mmHg   Mean Arterial Pressure :   81 mmHg   Peripheral Pulse Rate :   94 bpm   BP Site :   Right arm   BP Method :   Manual   HR Method :   Electronic   Temperature Tympanic :   97.6 DegF(Converted to: 36.4 DegC)  (LOW)    Oxygen Saturation :   98 %   Isabel Estrada CMA - 2021 11:05 AM CDT   Health Status   Allergies Verified? :   Yes   Medication History Verified? :   Yes   Immunizations Current :   No   Tobacco Use? :   Never smoker   Isabel Estrada CMA - 2021 11:05 AM CDT   Demographics   Last Name :   MERCED   First Name :   OBI   Responsible Party Date of Birth () :   2000 CST   Contact Relationship to Patient (other) :   Patient   Isabel Estrada CMA - 2021 11:05 AM CDT   Providers Grid   Provider Name :    Srinivasan Beltre              Provider Specialty :    Dentist                Isabel Estrada CMA - 2021 11:05 AM CDT         Ancillary Services Grid   Name :    [facility]              Type of Service :    Other: opthalmologist                Isabel Estrada CMA - 2021 11:05 AM CDT         Consents   Consent for Immunization Exchange :   Consent Granted   Consent for Immunizations to Providers :   Consent Granted   Isabel Estrada CMA - 2021 11:05 AM  CDT   Meds / Allergies   (As Of: 4/30/2021 11:11:51 AM CDT)   Allergies (Active)   Dust Mite  Estimated Onset Date:   Unspecified ; Created By:   Celsa Duff; Reaction Status:   Active ; Category:   Drug ; Substance:   Dust Mite ; Type:   Allergy ; Updated By:   Celsa Duff; Reviewed Date:   4/30/2021 11:09 AM CDT      Omnicef  Estimated Onset Date:   Unspecified ; Reactions:   Hives ; Created By:   Isabel Estrada CMA; Reaction Status:   Active ; Category:   Drug ; Substance:   Omnicef ; Type:   Allergy ; Updated By:   Isabel Estrada CMA; Reviewed Date:   4/30/2021 11:09 AM CDT        Medication List   (As Of: 4/30/2021 11:11:51 AM CDT)   Prescription/Discharge Order    metroNIDAZOLE topical  :   metroNIDAZOLE topical ; Status:   Prescribed ; Ordered As Mnemonic:   metroNIDAZOLE 0.75% vaginal gel with applicator ; Simple Display Line:   1 nickolas, VAG, hs, for 5 day(s), 70 gm, 0 Refill(s) ; Ordering Provider:   Justina Martines PA-C; Catalog Code:   metroNIDAZOLE topical ; Order Dt/Tm:   4/7/2021 5:06:22 PM CDT            Home Meds    iron polysaccharide  :   iron polysaccharide ; Status:   Documented ; Ordered As Mnemonic:   iron polysaccharide (as elemental iron) 200 mg oral capsule ; Simple Display Line:   200 mg, 1 cap(s), Oral, daily, 0 Refill(s) ; Catalog Code:   iron polysaccharide ; Order Dt/Tm:   4/7/2021 4:11:08 PM CDT          levothyroxine  :   levothyroxine ; Status:   Documented ; Ordered As Mnemonic:   levothyroxine 125 mcg (0.125 mg) oral tablet ; Simple Display Line:   125 mcg, 1 tab(s), Oral, daily, 0 Refill(s) ; Catalog Code:   levothyroxine ; Order Dt/Tm:   9/9/2020 11:58:22 AM CDT            ID Risk Screen   Recent Travel History :   No recent travel   Family Member Travel History :   No recent travel   Other Exposure to Infectious Disease :   Unknown   COVID-19 Testing Status :   No positive COVID-19 test   Isabel Estrada CMA - 4/30/2021 11:05 AM FILOMENA

## 2022-02-15 NOTE — PROGRESS NOTES
Patient:   OBI BLACKWOOD            MRN: [id number]            FIN: [id number]               Age:   20 years     Sex:  Female     :  2000   Associated Diagnoses:   Exposure to SARS-associated coronavirus   Author:   Cecilio MORGAN, Teo MCDANIEL      Visit Information      Date of Service: 2020 01:36 pm  Performing Location: [facility]  Encounter#: [id number]      Primary Care Provider (PCP):  NONE ,    Visit type:  Video Visit via [employer] or [employer].    Participants in room during visit:  1_   Location of patient:  _home  Location of provider:  _ (Clinic office   Video Start Time:  _1433  Video End Time:   _1438    Today's visit was conducted via video conference due to the COVID-19 pandemic.  The patient's consent to proceed with a video visit has been obtained and documented.      Chief Complaint   2020 2:18 PM CST   Verbal consent given for a video visit.  Pt is c/o cough,sore throat,chills,headache and runny nose x 2-3 days.        History of Present Illness   Patient is a _20 year old female_ who is being evaluated via a billable video visit.  3 day hx of cough, sore throat, chills, headache, runny nose  she would like to get tested for Covid, her father is on chemo      Review of Systems   Constitutional:  Chills, No fever.    Ear/Nose/Mouth/Throat:  Nasal congestion, Sore throat.    Respiratory:  Cough.    Neurologic:  Headache.       Health Status   Allergies:    Allergic Reactions (Selected)  Severity Not Documented  Dust Mite (No reactions were documented)  Omnicef (Hives)   Medications:  (Selected)   Documented Medications  Documented  Sprintec 0.25 mg-35 mcg oral tablet: 1 tab(s), Oral, daily, 0 Refill(s), Type: Maintenance  levothyroxine 125 mcg (0.125 mg) oral tablet: = 1 tab(s) ( 125 mcg ), Oral, daily, 0 Refill(s), Type: Maintenance   Problem list:    All Problems  Chronic abdominal pain / SNOMED CT 884601225 / Confirmed  Abnormal brain MRI / SNOMED CT 5387641812 /  Confirmed  Mild intermittent asthma / SNOMED CT 4142281770 / Confirmed  Hx of cholecystectomy / SNOMED CT 2749701374 / Confirmed  Pituitary adenoma / SNOMED CT 660732492 / Confirmed  Eczema / SNOMED CT 70333998 / Confirmed      Histories   Past Medical History:    No active or resolved past medical history items have been selected or recorded.   Family History:    Hypertension  Grandfather (P)  Migraine  Mother  Depression  Mother  Diabetes  Grandmother (P)  Cancer  Uncle (M)  Obese  Grandmother (P)     Procedure history:    Laparoscopic cholecystectomy (52964675) on 2/21/2018 at 17 Years.  Extraction of wisdom tooth (988114902).   Social History:        Electronic Cigarette/Vaping Assessment            Electronic Cigarette Use: has vaped in the past; nothing recently.      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      Home and Environment Assessment            Marital status: Single.  Risks in environment: Unlocked guns, Stairs.      Nutrition and Health Assessment            Type of diet: Regular.      Sexual Assessment            Sexually active: Yes.  Identifies as female, Sexual orientation: Straight or heterosexual.  Uses condoms:               Yes.  Contraceptive Use Details: Birth control pill.        Physical Examination   General:  No acute distress.    Respiratory:  Respirations are non-labored.    Psychiatric:  Cooperative, Appropriate mood & affect, Normal judgment.       Impression and Plan   Diagnosis     Exposure to SARS-associated coronavirus (DXY71-JT Z20.828).     Summary:  Patient is referred for [facility] COVID-19 testing and is instructed of the following:  Patient should remain isolated until results of test return and given that tests are not 100% accurate, would be safest to assume that they are  contagious with COVID-19 until their symptoms have fully resolved. Isolation is recommended for at least 10 days from the onset of symptoms and for 3 days after resolution of fevers and productive cough. This means patient should not go to work or any public areas. In addition, it is recommended at home that they separate themselves from other people and from animals as much as possible, including using a separate bathroom. If they do need to be around others, a facemask is recommended. Frequent hand hygiene and cleaning of high touch surfaces is also recommended.   Symptoms can last for several weeks. For patients with COVID-19, they can sometimes start to improve and then get worse again. If symptoms worsen at any time, including significant shortness of breath, low oxygen levels, high fevers that cannot be controlled, or concerns for dehydration, they should seek medical care. If going to the ER, calling 911, or seeking care at the clinic, they are reminded to notify staff that they have been tested for COVID-19.  Patient also is informed that testing will be done in their car at a scheduled time. Test will be sent to an outside commercial lab and billed by that lab. [employer] cannot confirm to patient how billing will be handled by their insurance company.    Patient is also informed that testing for COVID-19 must be reported to the public health department along with contact information for the patient.   Patient information is given to scheduling staff to get patient scheduled for testing. Patient will receive further instructions from scheduling staff.  Patient is encouraged to call back at any time with questions or concerns.  .    Orders     Orders   Requests (Lab):  SARS-CoV-2 RNA (COVID-19), Qualitative NAAT (Request) (Order): Exposure to SARS-associated coronavirus.     Orders   Charges (Evaluation and Management):  71912 office outpatient visit 15 minutes (Charge) (Order): Quantity: 1, Exposure to  SARS-associated coronavirus.   <<-----Click on this checkbox to enter Procedure Laparoscopy, with retroperitoneal exploration  12/06/2018  & conversion to open RP exploration with pelvic bone Bx  Active  AGAINES

## 2022-02-15 NOTE — NURSING NOTE
Comprehensive Intake Entered On:  2/28/2019 1:02 PM CST    Performed On:  2/28/2019 12:58 PM CST by Katie Hickman CMA               Summary   Chief Complaint :   c/o vaginal irritation after switching tampon brands Sat/Sun   Last Menstrual Period :   2/18/2019 CST   Menstrual Status :   Menarcheal   Weight Measured :   155.4 lb(Converted to: 155 lb 6 oz, 70.49 kg)    Height Measured :   62.75 in(Converted to: 5 ft 3 in, 159.38 cm)    Body Mass Index :   27.74 kg/m2   Body Surface Area :   1.76 m2   Systolic Blood Pressure :   118 mmHg   Diastolic Blood Pressure :   70 mmHg   Mean Arterial Pressure :   86 mmHg   Peripheral Pulse Rate :   68 bpm   BP Site :   Right arm   Pulse Site :   Radial artery   BP Method :   Manual   HR Method :   Manual   Temperature Tympanic :   98.2 DegF(Converted to: 36.8 DegC)    Katie Hickman CMA - 2/28/2019 12:58 PM CST   Health Status   Allergies Verified? :   Yes   Medication History Verified? :   Yes   Immunizations Current :   No   Medical History Verified? :   No   Pre-Visit Planning Status :   Completed   Tobacco Use? :   Never smoker   Katie Hickman CMA - 2/28/2019 12:58 PM CST   Meds / Allergies   (As Of: 2/28/2019 1:02:53 PM CST)   Allergies (Active)   Omnicef  Estimated Onset Date:   Unspecified ; Reactions:   Hives ; Created By:   Isabel Estrada CMA; Reaction Status:   Active ; Category:   Drug ; Substance:   Omnicef ; Type:   Allergy ; Updated By:   Isabel Estrada CMA; Reviewed Date:   2/28/2019 1:00 PM CST        Medication List   (As Of: 2/28/2019 1:02:53 PM CST)   Prescription/Discharge Order    cyclobenzaprine  :   cyclobenzaprine ; Status:   Processing ; Ordered As Mnemonic:   cyclobenzaprine 10 mg oral tablet ; Ordering Provider:   Steven Malik MD; Action Display:   Complete ; Catalog Code:   cyclobenzaprine ; Order Dt/Tm:   2/28/2019 1:01:00 PM            Home Meds    ethinyl estradiol-norgestimate  :   ethinyl estradiol-norgestimate ; Status:   Documented ;  Ordered As Mnemonic:   Sprintec 0.25 mg-35 mcg oral tablet ; Simple Display Line:   1 tab(s), Oral, daily, 0 Refill(s) ; Catalog Code:   ethinyl estradiol-norgestimate ; Order Dt/Tm:   9/10/2018 9:34:25 AM

## 2022-02-15 NOTE — PROGRESS NOTES
Patient:   MESERET BLACKWOOD            MRN: [id number]            FIN: [id number]               Age:   18 years     Sex:  Female     :  2000   Associated Diagnoses:   Perianal rash   Author:   Marika Plaza      Visit Information      Date of Service: 2019 01:40 pm  Performing Location: [facility]  Encounter#: [id number]      Primary Care Provider (PCP):  NONE ,       Referring Provider:  Marika Plaza    NPI# [id number]      Chief Complaint   2019 1:45 PM CST   Vaginal pain and itching for the past week.        History of Present Illness   Meseret underwent an appendectomy in [place] on Oct 23rd.  She tells me there were some complications and she was on a number of different antibiotics.  She was constipated for 1 week, started using stool softeners then had diarrhea.  Everything has healed ukp and stools are now regular but she has a rash around the anus and vaginal opening and itchiness. no OTC creams or  meds used. No fevers. She is with a steady sexual partner, both have 'been tested' for infections.      Health Status   Allergies:    Allergic Reactions (Selected)  Severity Not Documented  Dust Mite (No reactions were documented)  Omnicef (Hives)   Medications:  (Selected)   Prescriptions  Prescribed  ondansetron 4 mg oral tablet, disintegrating: = 1 tab(s) ( 4 mg ), Oral, q8 hrs, # 10 tab(s), 1 Refill(s), Type: Maintenance, Pharmacy: [facility] #[id number], 1 tab(s) Oral q8 hrs  Documented Medications  Documented  Advair HFA 45 mcg-21 mcg/inh inhalation aerosol: 2 puff(s), Inhale, bid, 0 Refill(s), Type: Maintenance  Claritin 10 mg oral tablet: See Instructions, Instructions: Up to 4 times daily., 0 Refill(s), Type: Maintenance  Sprintec 0.25 mg-35 mcg oral tablet: 1 tab(s), Oral, daily, 0 Refill(s), Type: Maintenance  levothyroxine 75 mcg (0.075 mg) oral tablet: = 1 tab(s) ( 75 mcg ), Oral, daily, 0 Refill(s), Type: Maintenance   Problem list:    All  Problems  Abnormal brain MRI / SNOMED CT 5856803348 / Confirmed  convex areaon pituitary seen on brain MRI  Chronic abdominal pain / SNOMED CT 024689130 / Confirmed  Eczema / SNOMED CT 72923920 / Confirmed  Hx of cholecystectomy / SNOMED CT 9326823439 / Confirmed  Mild intermittent asthma / SNOMED CT 8490733067 / Confirmed  Pituitary adenoma / SNOMED CT 590453864 / Confirmed      Histories   Past Medical History:    No active or resolved past medical history items have been selected or recorded.   Family History:    Hypertension  Grandfather (P)  Migraine  Mother  Depression  Mother  Diabetes  Grandmother (P)  Cancer  Uncle (M)  Obese  Grandmother (P)     Procedure history:    Laparoscopic cholecystectomy (SNOMED CT 93800757) performed by Oumar Mahmood MD on 2/21/2018 at 17 Years.  Extraction of wisdom tooth (SNOMED CT 793841703).   Social History:        Tobacco Assessment            Rarely      Substance Abuse Assessment            Never      Employment and Education Assessment            Part time, Work/School description: Full Time student.      Home and Environment Assessment            Marital status: Single.  Risks in environment: Unlocked guns, Stairs.      Nutrition and Health Assessment            Type of diet: Regular.      Sexual Assessment            Sexually active: Yes.  Identifies as female, Sexual orientation: Straight or heterosexual.  Uses condoms:               Yes.  Contraceptive Use Details: Birth control pill.        Physical Examination   Vital Signs   11/12/2019 1:45 PM CST Temperature Tympanic 98.2 DegF    Peripheral Pulse Rate 74 bpm    Pulse Site Radial artery    HR Method Manual    Systolic Blood Pressure 110 mmHg    Diastolic Blood Pressure 62 mmHg    Mean Arterial Pressure 78 mmHg    BP Site Right arm    BP Method Manual      Measurements from flowsheet : Measurements   11/12/2019 1:45 PM CST Height Measured - Standard 62.75 in    Weight Measured - Standard 151 lb    BSA 1.74  m2    Body Mass Index 26.96 kg/m2    Body Mass Index Percentile 86.68      General:  Alert and oriented, No acute distress, perianal area mildly erythematous, mild introital erythema  no pelvic internal exam done, no spec exam.       Impression and Plan   Diagnosis     Perianal rash ([id number] R21).     Patient Instructions:       Counseled: Patient, Regarding diagnosis, Regarding treatment, Regarding medications, Verbalized understanding, Counseled on symptomatic management. Return to clinic for re evaluation if worsening, simply not improving, or failure to resolve.   .    Orders     Orders (Selected)   Prescriptions  Prescribed  fluconazole 100 mg oral tablet: See Instructions, Instructions: 1 tab(s) Oral every other day for 3 doses, # 3 EA, 0 Refill(s), Type: Maintenance, Pharmacy: [facility] #[id number], 1 tab(s) Oral every other day for 3 doses  nystatin 100,000 units/g topical cream: 1 nickolas, Topical, tid, Instructions: to perianal area and externally vaginal area, # 15 gm, 0 Refill(s), Type: Maintenance, Pharmacy: [facility] #[id number], 1 nickolas Topical tid,x10 day(s),Instr:to perianal area and externally vaginal area.

## 2022-02-15 NOTE — LETTER
(Inserted Image. Unable to display)   December 07, 2020      OBI BLACKWOOD      [address] [place] [address] [phone]        Dear OBI,    Thank you for selecting [facility] for your healthcare needs.  Below you will find the results of the recent tests done at our clinic.      These tests are negative.      Result Name Current Result Reference Range   Chlamydia RNA  NOT DETECTED 12/3/2020 NOT DETECTED -    Neisseria gonorrhoeae RNA  NOT DETECTED 12/3/2020 NOT DETECTED -        Please contact me or my assistant at [phone] if you have any questions or concerns.     Sincerely,        ESA Monique-NP  Family Nurse Practitioner      What do your labs mean?  Below is a glossary of commonly ordered labs:  LDL   Bad Cholesterol   HDL   Good Cholesterol  AST/ALT   Liver Function   Cr/Creatinine   Kidney Function  Microalbumin   Kidney Function  BUN   Kidney Function  PSA   Prostate    TSH   Thyroid Hormone  HgbA1c   Diabetes Test   Hgb (Hemoglobin)   Red Blood Cells  WBC   White Blood Cell Count

## 2022-02-15 NOTE — NURSING NOTE
Comprehensive Intake Entered On:  5/10/2019 2:27 PM CDT    Performed On:  5/10/2019 2:12 PM CDT by Estela Crabtree CMA               Summary   Chief Complaint :   f/u MRI. Frequent headaches    Menstrual Status :   Menarcheal   Weight Measured :   155.4 lb(Converted to: 155 lb 6 oz, 70.49 kg)    Systolic Blood Pressure :   110 mmHg   Diastolic Blood Pressure :   66 mmHg   Mean Arterial Pressure :   81 mmHg   Peripheral Pulse Rate :   74 bpm   Temperature Tympanic :   97.7 DegF(Converted to: 36.5 DegC)  (LOW)    Oxygen Saturation :   99 %   Estela Crabtree CMA - 5/10/2019 2:12 PM CDT   Health Status   Allergies Verified? :   Yes   Medication History Verified? :   Yes   Immunizations Current :   No   Pre-Visit Planning Status :   Completed   Tobacco Use? :   Never smoker   Estela Crabtree CMA - 5/10/2019 2:12 PM CDT   Consents   Consent for Immunization Exchange :   Consent Granted   Consent for Immunizations to Providers :   Consent Granted   Estela Crabtree CMA - 5/10/2019 2:12 PM CDT   Meds / Allergies   (As Of: 5/10/2019 2:27:17 PM CDT)   Allergies (Active)   Omnicef  Estimated Onset Date:   Unspecified ; Reactions:   Hives ; Created By:   Isabel Estrada CMA; Reaction Status:   Active ; Category:   Drug ; Substance:   Omnicef ; Type:   Allergy ; Updated By:   Isabel Estrada CMA; Reviewed Date:   4/17/2019 12:32 PM CDT        Medication List   (As Of: 5/10/2019 2:27:17 PM CDT)   Prescription/Discharge Order    ondansetron  :   ondansetron ; Status:   Prescribed ; Ordered As Mnemonic:   ondansetron 4 mg oral tablet, disintegrating ; Simple Display Line:   4 mg, 1 tab(s), PO, q8 hrs, 10 tab(s), 0 Refill(s) ; Ordering Provider:   Deepthi Cormier MD; Catalog Code:   ondansetron ; Order Dt/Tm:   4/24/2019 1:11:23 PM            Home Meds    ethinyl estradiol-norgestimate  :   ethinyl estradiol-norgestimate ; Status:   Documented ; Ordered As Mnemonic:   Sprintec 0.25 mg-35 mcg oral tablet ; Simple Display Line:   1 tab(s),  Oral, daily, 0 Refill(s) ; Catalog Code:   ethinyl estradiol-norgestimate ; Order Dt/Tm:   9/10/2018 9:34:25 AM

## 2022-02-15 NOTE — TELEPHONE ENCOUNTER
"---------------------  From: Kristopher Jaquez   To: Cecilio MORGAN, Teo MCDANIEL;     Sent: 1/13/2021 3:27:31 PM CST  Subject: Covid results     Called pt about her negative COVID results. Pt states that she is feeling \"better than before.\" Pt had no questions or concerns at this time.  "

## 2022-03-02 VITALS
WEIGHT: 156 LBS | HEIGHT: 63 IN | DIASTOLIC BLOOD PRESSURE: 64 MMHG | BODY MASS INDEX: 27.64 KG/M2 | SYSTOLIC BLOOD PRESSURE: 118 MMHG | HEART RATE: 76 BPM | HEIGHT: 63 IN | BODY MASS INDEX: 27.63 KG/M2

## 2022-03-02 NOTE — TELEPHONE ENCOUNTER
---------------------  From: Livier Davis CMA (Phone Messages Pool ([id number]_[facility]))   To: Doretha Martinez MD;     Sent: 1/25/2022 4:28:02 PM CST  Subject: covid results     Called patient to notify of positive COVID test result.    Let patient know that public health will be notified as mandated by the state and patient may be contacted by public health directly as staffing allows.    Patient was offered a virtual visit to discuss any concerns. This is strongly encouraged for anyone who might qualify for further treatment such as monoclonal antibody treatment. Pt states that only has a runny nose and is fatigued    Patient declined a virtual visit    Patient was informed of the following    1. Patient should begin isolation. No work, no school, no leaving home except to seek medical attention. Should also separate from household contacts as much as possible including using a separate bathroom if able. Isolation lasts 10 days after positive test for asymptomatic patients or 10 days since symptoms started AND 24 hours after all symptoms resolve for patients with symptoms.    2. Unvaccinated household contacts should begin quarantine and get tested    3. Asymptomatic fully vaccinated household contacts do not need to quarantine if they have no symptoms, but should consider testing 5-7 days after initial exposure    4. Patient was encouraged to notify close contacts, specifically those that they had direct physical contact with or those people that were within 6 feet for at least 15 minutes in a 24 hour period in the 3 days prior to onset of symptomsnoted.

## 2022-03-02 NOTE — NURSING NOTE
Comprehensive Intake Entered On:  1/14/2022 12:01 PM CST    Performed On:  1/14/2022 11:56 AM CST by Alia Martin LPN               Summary   Chief Complaint :   concerns with vaginal discharge, would like to check for BV   Menstrual Status :   Menarcheal   Weight Measured :   156 lb(Converted to: 156 lb 0 oz, 70.760 kg)    Height Measured :   63 in(Converted to: 5 ft 3 in, 160.02 cm)    Body Mass Index :   27.63 kg/m2 (HI)    Body Surface Area :   1.77 m2   Systolic Blood Pressure :   118 mmHg   Diastolic Blood Pressure :   64 mmHg   Mean Arterial Pressure :   82 mmHg   Peripheral Pulse Rate :   76 bpm   BP Site :   Right arm   Pulse Site :   Radial artery   BP Method :   Manual   HR Method :   Manual   Alia Martin LPN - 1/14/2022 11:56 AM CST   Health Status   Allergies Verified? :   Yes   Medication History Verified? :   Yes   Immunizations Current :   No   Pre-Visit Planning Status :   Completed   Tobacco Use? :   Never smoker   Alia Martin LPN - 1/14/2022 11:56 AM CST   Consents   Consent for Immunization Exchange :   Consent Granted   Consent for Immunizations to Providers :   Consent Granted   Alia Martin LPN - 1/14/2022 11:56 AM CST   Meds / Allergies   (As Of: 1/14/2022 12:01:08 PM CST)   Allergies (Active)   Dust Mite  Estimated Onset Date:   Unspecified ; Created By:   Celsa Duff; Reaction Status:   Active ; Category:   Drug ; Substance:   Dust Mite ; Type:   Allergy ; Updated By:   Celsa Duff; Reviewed Date:   1/14/2022 11:59 AM CST      Omnicef  Estimated Onset Date:   Unspecified ; Reactions:   Hives ; Created By:   Isabel Estrada CMA; Reaction Status:   Active ; Category:   Drug ; Substance:   Omnicef ; Type:   Allergy ; Updated By:   Isabel Estrada CMA; Reviewed Date:   1/14/2022 11:59 AM CST        Medication List   (As Of: 1/14/2022 12:01:08 PM CST)   Prescription/Discharge Order    metroNIDAZOLE topical  :   metroNIDAZOLE topical ;  Status:   Processing ; Ordered As Mnemonic:   metroNIDAZOLE 0.75% vaginal gel with applicator ; Ordering Provider:   Justina Martines PA-C Action Display:   Complete ; Catalog Code:   metroNIDAZOLE topical ; Order Dt/Tm:   1/14/2022 11:59:55 AM CST            Home Meds    iron polysaccharide  :   iron polysaccharide ; Status:   Documented ; Ordered As Mnemonic:   iron polysaccharide (as elemental iron) 200 mg oral capsule ; Simple Display Line:   200 mg, 1 cap(s), Oral, daily, 0 Refill(s) ; Catalog Code:   iron polysaccharide ; Order Dt/Tm:   4/7/2021 4:11:08 PM CDT          levothyroxine  :   levothyroxine ; Status:   Documented ; Ordered As Mnemonic:   levothyroxine 125 mcg (0.125 mg) oral tablet ; Simple Display Line:   125 mcg, 1 tab(s), Oral, daily, 0 Refill(s) ; Catalog Code:   levothyroxine ; Order Dt/Tm:   9/9/2020 11:58:22 AM CDT            Social History   Social History   (As Of: 1/14/2022 12:01:08 PM CST)   Tobacco:        Never (less than 100 in lifetime)   (Last Updated: 1/14/2022 11:59:27 AM CST by Alia Martin LPN)          Electronic Cigarette/Vaping:        Electronic Cigarette Use: has vaped in the past; nothing recently.   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   (Last Updated: 9/9/2020 12:02:48 PM CDT by Aníbal Luna CMA)          Home/Environment:        Marital status: Single.  Risks in environment: Unlocked guns, Stairs.   (Last Updated: 9/24/2018 10:31:55 AM CDT by Kaitlyn Sweet)          Nutrition/Health:        Type of diet: Regular.   (Last Updated: 9/24/2018 10:32:00 AM CDT by Kaitlyn Sweet)          Sexual:        Sexually active: Yes.  Identifies as female, Sexual orientation: Straight or heterosexual.  Uses condoms: Yes.  Contraceptive Use  Details: Birth control pill.   (Last Updated: 9/24/2018 10:32:14 AM CDT by Kaitlyn Sweet)

## 2022-03-02 NOTE — PROGRESS NOTES
Chief Complaint    concerns with vaginal discharge, would like to check for BV  History of Present Illness      Chief complaint as above reviewed and confirmed with patient.  Pt presents to the clinic with concerns re: vaginal discharge, irritation, feels like previous yeast infection but has had bV as well.  would like STI screening as well. no abdomen pain, back pain, dysuria, fevers, chills, nausea, vomiting.       defers any HIV/RPR/Hep C testing at this time.   Review of Systems      Review of systems is negative with the exception of those noted in HPI   Physical Exam   Vitals & Measurements    HR: 76 (Peripheral)  BP: 118/64     HT: 63 in  WT: 156 lb  BMI: 27.63       self swab done       Wet prep unremarkable   Assessment/Plan       Vaginal discharge (N89.8)         reassured of negative Wet prep.  await GC/chlamydia.  pt requests Diflucan to have if GC/Chlamydia negative and sx worsening as she has a hx.  I agreed but would like her to give it a few days to see if GC/chlamydia negative and if worsening.         Ordered:          Chlamydia/Neisseria gonorrhoeae RNA, TMA* ([facility]), Specimen Type: Swab, Collection Date: 01/14/22 12:24:00 CST          Wet Prep Vaginal (Request), Priority: STAT, Vaginal discharge                Orders:         fluconazole, = 1 tab(s) ( 150 mg ), Oral, once, # 1 tab(s), 0 Refill(s), Type: Soft Stop, Pharmacy: [facility] #[id number], 1 tab(s) Oral once, 63, in, 01/14/22 11:56:00 CST, Height Measured, 156, lb, 01/14/22 11:56:00 CST, Weight Measured, (Ordered)         metroNIDAZOLE topical, 1 nickolas, VAG, hs, # 70 gm, 0 Refill(s), Type: Soft Stop, Pharmacy: [facility] #[id number], please omit oral abx previously sent., 1 nickolas Vaginal hs,x5 day(s), 63, in, 04/07/21 16:08:00 CDT, Height Measured, 165.2, lb, 04/07/21 16:08:00 CDT, Weight M..., (Completed)  Patient Information     Name:OBI BLACKWOOD      Address:      [address] [place] [id number]     Sex:Female      YOB: 2000     [phone]     Emergency Contact:JESUS BLACKWOOD     MRN:[id number]     FIN:[id number]     Location:[facility]     Date of Service:01/14/2022      Primary Care Physician:       NONE ,       Attending Physician:       Bobbi MORGAN, Justina HARRIS, [phone]  Problem List/Past Medical History    Ongoing     Abnormal brain MRI       Comments: convex areaon pituitary seen on brain MRI     Chronic abdominal pain     Eczema     Hx of cholecystectomy     Mild intermittent asthma     Pituitary adenoma    Historical     No qualifying data  Procedure/Surgical History     Laparoscopic cholecystectomy (02/21/2018)     Extraction of wisdom tooth  Medications    Diflucan 150 mg oral tablet, 150 mg= 1 tab(s), Oral, once    iron polysaccharide (as elemental iron) 200 mg oral capsule, 200 mg= 1 cap(s), Oral, daily    levothyroxine 125 mcg (0.125 mg) oral tablet, 125 mcg= 1 tab(s), Oral, daily  Allergies    Dust Mite    Omnicef (Hives)  Social History    Smoking Status     Never smoker     Electronic Cigarette/Vaping      Electronic Cigarette Use: has vaped in the past; nothing recently.     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     Home/Environment      Marital status: Single. Risks in environment: Unlocked guns, Stairs.     Nutrition/Health      Type of diet: Regular.     Sexual      Sexually active: Yes. Identifies as female, Sexual orientation: Straight or heterosexual. Uses condoms: Yes. Contraceptive Use Details: Birth control pill.     Substance Abuse      Never     Tobacco      Never (less than 100 in lifetime)  Family History    Cancer: Uncle (M).    Depression: Mother.    Diabetes: Grandmother (P).    Hypertension: Grandfather (P).    Migraine: Mother.    Obese: Grandmother (P).  Immunizations       Scheduled Immunizations       Dose Date(s)       Hep B       11/21/2000       Hep B-Hib       04/10/2000, 02/14/2000       Hib        07/21/2000       Hib (PRP-T)       07/21/2000       human papillomavirus vaccine       03/20/2018, 03/13/2017, 07/24/2017       influenza virus vaccine, inactivated       12/21/2009, 10/31/2008       IPV       02/14/2000, 04/10/2000, 05/10/2001, 08/25/2005       meningococcal conjugate vaccine       03/13/2017       MMR (measles/mumps/rubella)       11/21/2000, 08/25/2005       tetanus/diphth/pertuss (Tdap) adult/adol       10/07/2011       varicella       11/21/2000, 08/29/2008       Other Immunizations               influenza virus vaccine, inactivated       02/26/2021       DTaP       02/14/2000, 04/10/2000, 07/21/2000, 05/10/2001, 08/25/2005

## 2022-03-02 NOTE — PROGRESS NOTES
Chief Complaint    consent for telephone visit to discuss covid sx... started on Sat 1/22... cough, ST, HA, Fatigue, runny nose  History of Present Illness       Patient is a 21-year-old female on telemedicine visit today to       Call time 4:29 PM through 4:36 PM       Patient at home in [facility]       Physician in clinic in [facility]       Patient developed a sore throat and rhinitis on January 22, 2 days ago.  She had first thought it was allergies but now has developed headache, cough, fatigue and myalgias.       She denies fevers, chills, shortness of breath, abdominal pain, nausea, diarrhea.  No change in her ability to taste or smell.       She is vaccinated to Covid       She is vaccinated for the flu and also had the flu a few weeks ago       She works at [employer]  Review of Systems       Negative except as listed in HPI  Physical Exam   Vitals & Measurements    HT: 63 in        Telemedicine visit       In general she is alert, oriented, no acute distress       Breathing not labored  Assessment/Plan       Cough (R05.9)          We will have her come by to be swabbed for Covid by yumi         Advised that it takes about 2 days for results to get back         Isolate at home until results available         Over-the-counter medications as needed         Patient verbalized understanding         Ordered:          SARS-CoV-2 RNA (COVID-19), Qualitative NAAT (Request), Sore throat  Cough                Sore throat (J02.9)         Ordered:          SARS-CoV-2 RNA (COVID-19), Qualitative NAAT (Request), Sore throat  Cough           Patient Information     Name:BOI BLACKWOOD      Address:      [address] [place] [id number]     Sex:Female     YOB: 2000     [phone]     Emergency Contact:JESUS BLACKWOOD     MRN:[id number]     FIN:[id number]     Location:[facility]     Date of Service:01/24/2022      Primary Care Physician:       NONE ,        Attending Physician:       Doretha Martinez MD, [phone]  Problem List/Past Medical History    Ongoing     Abnormal brain MRI       Comments: convex areaon pituitary seen on brain MRI     Chronic abdominal pain     Eczema     Hx of cholecystectomy     Mild intermittent asthma     Pituitary adenoma    Historical     No qualifying data  Procedure/Surgical History     Laparoscopic cholecystectomy (02/21/2018)     Extraction of wisdom tooth  Medications    Diflucan 150 mg oral tablet, 150 mg= 1 tab(s), Oral, once    iron polysaccharide (as elemental iron) 200 mg oral capsule, 200 mg= 1 cap(s), Oral, daily    levothyroxine 125 mcg (0.125 mg) oral tablet, 125 mcg= 1 tab(s), Oral, daily  Allergies    Dust Mite    Omnicef (Hives)  Social History    Smoking Status     Never smoker     Electronic Cigarette/Vaping      Electronic Cigarette Use: has vaped in the past; nothing recently.     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     Home/Environment      Marital status: Single. Risks in environment: Unlocked guns, Stairs.     Nutrition/Health      Type of diet: Regular.     Sexual      Sexually active: Yes. Identifies as female, Sexual orientation: Straight or heterosexual. Uses condoms: Yes. Contraceptive Use Details: Birth control pill.     Substance Abuse      Never     Tobacco      Never (less than 100 in lifetime)  Family History    Cancer: Uncle (M).    Depression: Mother.    Diabetes: Grandmother (P).    Hypertension: Grandfather (P).    Migraine: Mother.    Obese: Grandmother (P).  Lab Results       Lab Results (Last 4 results within 90 days)        Chlam/N. gonorrhea Comments: See comment (01/14/22 12:35:00)       Chlamydia RNA: NOT DETECTED (01/14/22 12:35:00)       Neisseria gonorrhoeae RNA: NOT DETECTED (01/14/22 12:35:00)  Immunizations       Scheduled Immunizations       Dose Date(s)       Hep B       11/21/2000       Hep B-Hib       04/10/2000,  02/14/2000       Hib       07/21/2000       Hib (PRP-T)       07/21/2000       human papillomavirus vaccine       03/20/2018, 03/13/2017, 07/24/2017       influenza virus vaccine, inactivated       12/21/2009, 10/31/2008       IPV       02/14/2000, 04/10/2000, 05/10/2001, 08/25/2005       meningococcal conjugate vaccine       03/13/2017       MMR (measles/mumps/rubella)       11/21/2000, 08/25/2005       tetanus/diphth/pertuss (Tdap) adult/adol       10/07/2011       varicella       11/21/2000, 08/29/2008       Other Immunizations               influenza virus vaccine, inactivated       02/26/2021       DTaP       02/14/2000, 04/10/2000, 07/21/2000, 05/10/2001, 08/25/2005

## 2022-03-02 NOTE — NURSING NOTE
Comprehensive Intake Entered On:  1/24/2022 4:24 PM CST    Performed On:  1/24/2022 4:23 PM CST by Livier Davis CMA               Summary   Chief Complaint :   consent for telephone visit to discuss covid sx... started on Sat 1/22... cough, ST, HA, Fatigue, runny nose   Menstrual Status :   Menarcheal   Height Measured :   63 in(Converted to: 5 ft 3 in, 160.02 cm)    Livier Davis CMA - 1/24/2022 4:23 PM CST   Health Status   Allergies Verified? :   Yes   Medication History Verified? :   Yes   Immunizations Current :   No   Medical History Verified? :   Yes   Tobacco Use? :   Never smoker   Livier Davis CMA - 1/24/2022 4:23 PM CST   Consents   Consent for Immunization Exchange :   Consent Granted   Consent for Immunizations to Providers :   Consent Granted   Livier Davis CMA - 1/24/2022 4:23 PM CST   Meds / Allergies   (As Of: 1/24/2022 4:24:29 PM CST)   Allergies (Active)   Dust Mite  Estimated Onset Date:   Unspecified ; Created By:   Celsa Duff; Reaction Status:   Active ; Category:   Drug ; Substance:   Dust Mite ; Type:   Allergy ; Updated By:   Celsa Duff; Reviewed Date:   1/24/2022 4:24 PM CST      Omnicef  Estimated Onset Date:   Unspecified ; Reactions:   Hives ; Created By:   Isabel Estrada CMA; Reaction Status:   Active ; Category:   Drug ; Substance:   Omnicef ; Type:   Allergy ; Updated By:   Isabel Estrada CMA; Reviewed Date:   1/24/2022 4:24 PM CST        Medication List   (As Of: 1/24/2022 4:24:29 PM CST)   Prescription/Discharge Order    fluconazole  :   fluconazole ; Status:   Prescribed ; Ordered As Mnemonic:   Diflucan 150 mg oral tablet ; Simple Display Line:   150 mg, 1 tab(s), Oral, once, 1 tab(s), 0 Refill(s) ; Ordering Provider:   Justina Martines PA-C; Catalog Code:   fluconazole ; Order Dt/Tm:   1/14/2022 12:56:17 PM CST            Home Meds    iron polysaccharide  :   iron polysaccharide ; Status:   Documented ; Ordered As Mnemonic:    iron polysaccharide (as elemental iron) 200 mg oral capsule ; Simple Display Line:   200 mg, 1 cap(s), Oral, daily, 0 Refill(s) ; Catalog Code:   iron polysaccharide ; Order Dt/Tm:   4/7/2021 4:11:08 PM CDT          levothyroxine  :   levothyroxine ; Status:   Documented ; Ordered As Mnemonic:   levothyroxine 125 mcg (0.125 mg) oral tablet ; Simple Display Line:   125 mcg, 1 tab(s), Oral, daily, 0 Refill(s) ; Catalog Code:   levothyroxine ; Order Dt/Tm:   9/9/2020 11:58:22 AM CDT

## 2022-07-01 ENCOUNTER — OFFICE VISIT (OUTPATIENT)
Dept: FAMILY MEDICINE | Facility: CLINIC | Age: 22
End: 2022-07-01
Payer: COMMERCIAL

## 2022-07-01 VITALS
HEIGHT: 63 IN | SYSTOLIC BLOOD PRESSURE: 122 MMHG | HEART RATE: 79 BPM | DIASTOLIC BLOOD PRESSURE: 78 MMHG | BODY MASS INDEX: 27.46 KG/M2 | WEIGHT: 155 LBS

## 2022-07-01 DIAGNOSIS — Z23 ENCOUNTER FOR IMMUNIZATION: ICD-10-CM

## 2022-07-01 DIAGNOSIS — K59.00 CONSTIPATION, UNSPECIFIED CONSTIPATION TYPE: Primary | ICD-10-CM

## 2022-07-01 LAB
ALBUMIN SERPL BCG-MCNC: 4.3 G/DL (ref 3.5–5.2)
ALP SERPL-CCNC: 55 U/L (ref 35–104)
ALT SERPL W P-5'-P-CCNC: 13 U/L (ref 10–35)
AMYLASE SERPL-CCNC: 34 U/L (ref 28–100)
ANION GAP SERPL CALCULATED.3IONS-SCNC: 7 MMOL/L (ref 7–15)
AST SERPL W P-5'-P-CCNC: 14 U/L (ref 10–35)
BILIRUB SERPL-MCNC: 0.5 MG/DL
BUN SERPL-MCNC: 13.7 MG/DL (ref 6–20)
CALCIUM SERPL-MCNC: 9 MG/DL (ref 8.6–10)
CHLORIDE SERPL-SCNC: 106 MMOL/L (ref 98–107)
CREAT SERPL-MCNC: 0.76 MG/DL (ref 0.51–0.95)
DEPRECATED HCO3 PLAS-SCNC: 25 MMOL/L (ref 22–29)
ERYTHROCYTE [DISTWIDTH] IN BLOOD BY AUTOMATED COUNT: 11.9 % (ref 10–15)
GFR SERPL CREATININE-BSD FRML MDRD: >90 ML/MIN/1.73M2
GLUCOSE SERPL-MCNC: 104 MG/DL (ref 70–99)
HCT VFR BLD AUTO: 40.4 % (ref 35–47)
HGB BLD-MCNC: 13.4 G/DL (ref 11.7–15.7)
LIPASE SERPL-CCNC: 26 U/L (ref 13–60)
MCH RBC QN AUTO: 29.3 PG (ref 26.5–33)
MCHC RBC AUTO-ENTMCNC: 33.2 G/DL (ref 31.5–36.5)
MCV RBC AUTO: 88 FL (ref 78–100)
PLATELET # BLD AUTO: 258 10E3/UL (ref 150–450)
POTASSIUM SERPL-SCNC: 4.1 MMOL/L (ref 3.4–5.3)
PROT SERPL-MCNC: 6.7 G/DL (ref 6.4–8.3)
RBC # BLD AUTO: 4.57 10E6/UL (ref 3.8–5.2)
SODIUM SERPL-SCNC: 138 MMOL/L (ref 136–145)
TSH SERPL DL<=0.005 MIU/L-ACNC: 1.56 UIU/ML (ref 0.3–4.2)
WBC # BLD AUTO: 7.4 10E3/UL (ref 4–11)

## 2022-07-01 PROCEDURE — 84443 ASSAY THYROID STIM HORMONE: CPT | Performed by: NURSE PRACTITIONER

## 2022-07-01 PROCEDURE — 85027 COMPLETE CBC AUTOMATED: CPT | Performed by: NURSE PRACTITIONER

## 2022-07-01 PROCEDURE — 83690 ASSAY OF LIPASE: CPT | Performed by: NURSE PRACTITIONER

## 2022-07-01 PROCEDURE — 90471 IMMUNIZATION ADMIN: CPT | Performed by: NURSE PRACTITIONER

## 2022-07-01 PROCEDURE — 36415 COLL VENOUS BLD VENIPUNCTURE: CPT | Performed by: NURSE PRACTITIONER

## 2022-07-01 PROCEDURE — 82150 ASSAY OF AMYLASE: CPT | Performed by: NURSE PRACTITIONER

## 2022-07-01 PROCEDURE — 99214 OFFICE O/P EST MOD 30 MIN: CPT | Mod: 25 | Performed by: NURSE PRACTITIONER

## 2022-07-01 PROCEDURE — 80053 COMPREHEN METABOLIC PANEL: CPT | Performed by: NURSE PRACTITIONER

## 2022-07-01 PROCEDURE — 90715 TDAP VACCINE 7 YRS/> IM: CPT | Performed by: NURSE PRACTITIONER

## 2022-07-01 RX ORDER — LEVOTHYROXINE SODIUM 125 UG/1
1 TABLET ORAL DAILY
COMMUNITY
Start: 2020-07-24 | End: 2022-12-05

## 2022-07-01 NOTE — PROGRESS NOTES
"  Assessment & Plan     (K59.00) Constipation, unspecified constipation type  (primary encounter diagnosis)  Comment: We will do some preliminary labs and have her start MiraLAX each evening.  Certainly if the stools become too frequent or loose she can pull back on the MiraLAX.  Counseled on adequate fiber and fluid intake.  It would be reasonable to trial some omeprazole in case some of her pain is really acid rather than fullness due to constipation.  Given duration of symptoms I will have her see GI as well  Plan: Adult GI  Referral - Consult Only, CBC        with platelets, TSH with free T4 reflex,         Comprehensive metabolic panel, Amylase, Lipase            (Z23) Encounter for immunization  Comment:   Plan: TDAP VACCINE (Adacel, Boostrix)                       BMI:   Estimated body mass index is 27.46 kg/m  as calculated from the following:    Height as of this encounter: 1.6 m (5' 3\").    Weight as of this encounter: 70.3 kg (155 lb).           No follow-ups on file.    Marika Varghese NP  [facility]    Missy Carl is a 21 year old, presenting for the following health issues:    States she has history for many years of constipation since she had her gallbladder out in her middle school years.  She might have 1 or 2 stools a week.  She will need to use mag citrate and then she will have a large stool in the stool for a few more days but she continues to have generalized abdominal pain.  Sometimes the pain is left-sided or right-sided but sometimes it is epigastric.  There is never nausea or vomiting.  She did have some weight loss.  It is unclear if certain foods make this worse.  She has used MiraLAX and multiple over-the-counter remedies but she is never used anything routinely to help prevent constipation only to treat it.  She does have hypothyroidism and we will check TSH as well in relationship to this  Constipation (Worse the last few months. " ")      History of Present Illness       Reason for visit:  Chronic constipation and abdominal pain  Symptom onset:  More than a month  Symptoms include:  Constipation and pain  Symptom intensity:  Severe  Symptom progression:  Worsening  Had these symptoms before:  Yes  Has tried/received treatment for these symptoms:  Yes  Previous treatment was successful:  No  What makes it worse:  No  What makes it better:  Mg citrate helped for a day    She eats 0-1 servings of fruits and vegetables daily.She consumes 0 sweetened beverage(s) daily.She exercises with enough effort to increase her heart rate 30 to 60 minutes per day.  She exercises with enough effort to increase her heart rate 4 days per week. She is missing 1 dose(s) of medications per week.  She is not taking prescribed medications regularly due to remembering to take.             Review of Systems         Objective    /78 (BP Location: Right arm, Patient Position: Chair, Cuff Size: Adult Regular)   Pulse 79   Ht 1.6 m (5' 3\")   Wt 70.3 kg (155 lb)   BMI 27.46 kg/m    Body mass index is 27.46 kg/m .  Physical Exam   Heart regular rate rhythm lungs clear  Abdomen with active bowel tones no  Abdomen soft in all quadrants with generalized tenderness in all quadrants in the epigastric area                      .  ..  "

## 2022-07-11 ENCOUNTER — ANCILLARY PROCEDURE (OUTPATIENT)
Dept: GENERAL RADIOLOGY | Facility: CLINIC | Age: 22
End: 2022-07-11
Attending: INTERNAL MEDICINE
Payer: COMMERCIAL

## 2022-07-11 ENCOUNTER — OFFICE VISIT (OUTPATIENT)
Dept: GASTROENTEROLOGY | Facility: CLINIC | Age: 22
End: 2022-07-11
Attending: NURSE PRACTITIONER
Payer: COMMERCIAL

## 2022-07-11 VITALS
TEMPERATURE: 97.1 F | OXYGEN SATURATION: 97 % | HEIGHT: 63 IN | SYSTOLIC BLOOD PRESSURE: 119 MMHG | DIASTOLIC BLOOD PRESSURE: 78 MMHG | HEART RATE: 65 BPM | WEIGHT: 154.5 LBS | BODY MASS INDEX: 27.38 KG/M2

## 2022-07-11 DIAGNOSIS — Z11.4 SCREENING FOR HIV (HUMAN IMMUNODEFICIENCY VIRUS): ICD-10-CM

## 2022-07-11 DIAGNOSIS — K59.00 CONSTIPATION, UNSPECIFIED CONSTIPATION TYPE: Primary | ICD-10-CM

## 2022-07-11 DIAGNOSIS — Z11.59 NEED FOR HEPATITIS C SCREENING TEST: ICD-10-CM

## 2022-07-11 DIAGNOSIS — K59.00 CONSTIPATION, UNSPECIFIED CONSTIPATION TYPE: ICD-10-CM

## 2022-07-11 DIAGNOSIS — K58.1 IRRITABLE BOWEL SYNDROME WITH CONSTIPATION: ICD-10-CM

## 2022-07-11 LAB
CRP SERPL-MCNC: <2.9 MG/L (ref 0–8)
ERYTHROCYTE [SEDIMENTATION RATE] IN BLOOD BY WESTERGREN METHOD: 7 MM/HR (ref 0–20)
FERRITIN SERPL-MCNC: 114 NG/ML (ref 12–150)
IRON SATN MFR SERPL: 15 % (ref 15–46)
IRON SERPL-MCNC: 47 UG/DL (ref 35–180)
TIBC SERPL-MCNC: 304 UG/DL (ref 240–430)

## 2022-07-11 PROCEDURE — 83550 IRON BINDING TEST: CPT

## 2022-07-11 PROCEDURE — 85652 RBC SED RATE AUTOMATED: CPT

## 2022-07-11 PROCEDURE — 82784 ASSAY IGA/IGD/IGG/IGM EACH: CPT

## 2022-07-11 PROCEDURE — 82728 ASSAY OF FERRITIN: CPT

## 2022-07-11 PROCEDURE — 86364 TISS TRNSGLTMNASE EA IG CLAS: CPT

## 2022-07-11 PROCEDURE — 36415 COLL VENOUS BLD VENIPUNCTURE: CPT

## 2022-07-11 PROCEDURE — 87389 HIV-1 AG W/HIV-1&-2 AB AG IA: CPT

## 2022-07-11 PROCEDURE — 99204 OFFICE O/P NEW MOD 45 MIN: CPT | Performed by: INTERNAL MEDICINE

## 2022-07-11 PROCEDURE — 74019 RADEX ABDOMEN 2 VIEWS: CPT | Mod: GC | Performed by: RADIOLOGY

## 2022-07-11 PROCEDURE — 86803 HEPATITIS C AB TEST: CPT

## 2022-07-11 PROCEDURE — 86140 C-REACTIVE PROTEIN: CPT

## 2022-07-11 RX ORDER — POLYETHYLENE GLYCOL 3350 17 G/17G
1 POWDER, FOR SOLUTION ORAL DAILY
COMMUNITY
End: 2023-01-04

## 2022-07-11 ASSESSMENT — PAIN SCALES - GENERAL: PAINLEVEL: MILD PAIN (3)

## 2022-07-11 NOTE — PATIENT INSTRUCTIONS
Continue your miralax daily.      Please have an abdominal x-ray done and blood work and submit a stool sample.

## 2022-07-11 NOTE — LETTER
7/11/2022         RE: Meseret Hatch  [address] [facility] [address]        Dear Colleague,    Thank you for referring your patient, Meseret Hatch, to the [facility]. Please see a copy of my visit note below.    GI CLINIC VISIT    CC/REFERRING MD:  Marika Varghese  REASON FOR CONSULTATION:   Marika Varghese for   Chief Complaint   Patient presents with     New Patient     New consult for constipation.         HPI   Meseret presents today to discuss constipation and abdominal pain.  Has been a longstanding issue but more prominent over the last few months. Pain is now daily.  Generally has 1-2 BMs a week which she reports as pebble sized. Did try taking magnesium citrate which resulted in multiple BMs - has now been on daily miralax - having 3-4 BMs a day - mostly softer/liquid although has passed some solid stools. Pain has unfortunately persisted despite having BMs.    Has a history of cholecystectomy for similar symptoms.  Also has a history of an appendectomy.    Occasional NSAID use (1-2 times a week).  Alcohol ~ 1 time a week. No tobacco    Is on iron pills and has been for sometime.  I see a one time hemoglobin of 10.7 but have otherwise been normal.  Was attributed to heavy menses but has an IUD now and no longer menstruates.     ROS:    No fevers or chills  No weight loss  No blurry vision, double vision or change in vision  No sore throat  No lymphadenopathy  No headache, paraesthesias, or weakness in a limb  No shortness of breath or wheezing  No chest pain or pressure  No arthralgias or myalgias  No rashes or skin changes  No odynophagia or dysphagia  No BRBPR, hematochezia, melena  No dysuria, frequency or urgency  No hot/cold intolerance or polyria  No anxiety or depression    PERTINENT PAST MEDICAL HISTORY:  Hypothyroidism  Fibromyalgia    PREVIOUS SURGERIES:  Past Surgical History:   Procedure Laterality Date     APPENDECTOMY N/A 2019     CHOLECYSTECTOMY OPEN N/A 2018        PREVIOUS ENDOSCOPY:    None  ALLERGIES:     Allergies   Allergen Reactions     Cefdinir Hives     Tolerates Amoxicillin  Tolerates Amoxicillin       Cats Itching     Dust Mites      Other reaction(s): *Unknown       PERTINENT MEDICATIONS:    Current Outpatient Medications:      levothyroxine (SYNTHROID/LEVOTHROID) 125 MCG tablet, Take 1 tablet by mouth daily, Disp: , Rfl:      omeprazole (PRILOSEC) 20 MG DR capsule, Take 20 mg by mouth daily, Disp: , Rfl:      polyethylene glycol (MIRALAX) 17 GM/Dose powder, Take 1 capful by mouth daily, Disp: , Rfl:      Polysaccharide Iron Complex 434.8 (200 Fe) MG CAPS, Take 200 mg by mouth, Disp: , Rfl:     SOCIAL HISTORY:  Social History     Socioeconomic History     Marital status: Single     Spouse name: Not on file     Number of children: Not on file     Years of education: Not on file     Highest education level: Not on file   Occupational History     Not on file   Tobacco Use     Smoking status: Never Smoker     Smokeless tobacco: Never Used   Vaping Use     Vaping Use: Former   Substance and Sexual Activity     Alcohol use: Not on file     Drug use: Not on file     Sexual activity: Not on file   Other Topics Concern     Not on file   Social History Narrative     Not on file     Social Determinants of Health     Financial Resource Strain: Not on file   Food Insecurity: Not on file   Transportation Needs: Not on file   Physical Activity: Not on file   Stress: Not on file   Social Connections: Not on file   Intimate Partner Violence: Not on file   Housing Stability: Not on file       FAMILY HISTORY:    Father with sarcoidosis and NMDA receptor encephalitis    Past/family/social history reviewed and no changes    PHYSICAL EXAMINATION:  Constitutional: aaox3, cooperative, pleasant, not dyspneic/diaphoretic, no acute distress  Vitals reviewed: /78 (BP Location: Left arm, Patient Position: Sitting, Cuff Size: Adult Regular)   Pulse 65   Temp 97.1  F (36.2  C)  "(Oral)   Ht 1.6 m (5' 3\")   Wt 70.1 kg (154 lb 8 oz)   SpO2 97%   BMI 27.37 kg/m    Wt:   Wt Readings from Last 2 Encounters:   07/11/22 70.1 kg (154 lb 8 oz)   07/01/22 70.3 kg (155 lb)      Eyes: Sclera anicteric/injected  Ears/nose/mouth/throat: Normal oropharynx without ulcers or exudate, mucus membranes moist, hearing intact  Neck: supple, thyroid normal size  CV: No edema  Respiratory: Unlabored breathing  Lymph: No axillary, submandibular, supraclavicular or inguinal lymphadenopathy  Abd:  Nondistended, no hepatosplenomegaly, nontender, no peritoneal signs  Skin: warm, perfused, no jaundice  Psych: Normal affect  MSK: Normal gait      PERTINENT STUDIES:  Most recent CBC:  Recent Labs   Lab Test 07/01/22  1146 10/23/19  1213   WBC 7.4 20.8*   HGB 13.4 13.8   HCT 40.4 40.0    302     Most recent hepatic panel:  Recent Labs   Lab Test 07/01/22  1146 10/23/19  1203   ALT 13 10   AST 14 12     Most recent creatinine:  Recent Labs   Lab Test 07/01/22  1146 10/23/19  1213   CR 0.76 0.86     CT 2019 - normal    ASSESSMENT/PLAN:    # abdominal pain, constipation - likely IBS-C - has been having BMs since starting miralax about a week ago but unfortunately pain has persisted.  Will check AXR to assess stool burden - further recommendations pending those results.  Will also check celiac serologies and inflammatory markers.  Will hold off on cross sectional imaging as she has had this completed in the past for similar symptoms but if persistent symptoms, may consider.     # history of TREY - attributed to heavy menses in the past but now with IUD.  Will check iron studies - if normal - can stop iron as maybe exacerbating her constipation    # hypothyroidism - recent TSH normal    # s/p cholecystectomy    # fibromyalgia    RTC 6 months or sooner if needed.      Afshan Chatman, DO          Again, thank you for allowing me to participate in the care of your patient.        Sincerely,        Afshan Chatman, " DO

## 2022-07-11 NOTE — PROGRESS NOTES
GI CLINIC VISIT    CC/REFERRING MD:  Marika Varghese  REASON FOR CONSULTATION:   Marika Varghese for   Chief Complaint   Patient presents with     New Patient     New consult for constipation.         NIKITA Carl presents today to discuss constipation and abdominal pain.  Has been a longstanding issue but more prominent over the last few months. Pain is now daily.  Generally has 1-2 BMs a week which she reports as pebble sized. Did try taking magnesium citrate which resulted in multiple BMs - has now been on daily miralax - having 3-4 BMs a day - mostly softer/liquid although has passed some solid stools. Pain has unfortunately persisted despite having BMs.    Has a history of cholecystectomy for similar symptoms.  Also has a history of an appendectomy.    Occasional NSAID use (1-2 times a week).  Alcohol ~ 1 time a week. No tobacco    Is on iron pills and has been for sometime.  I see a one time hemoglobin of 10.7 but have otherwise been normal.  Was attributed to heavy menses but has an IUD now and no longer menstruates.     ROS:    No fevers or chills  No weight loss  No blurry vision, double vision or change in vision  No sore throat  No lymphadenopathy  No headache, paraesthesias, or weakness in a limb  No shortness of breath or wheezing  No chest pain or pressure  No arthralgias or myalgias  No rashes or skin changes  No odynophagia or dysphagia  No BRBPR, hematochezia, melena  No dysuria, frequency or urgency  No hot/cold intolerance or polyria  No anxiety or depression    PERTINENT PAST MEDICAL HISTORY:  Hypothyroidism  Fibromyalgia    PREVIOUS SURGERIES:  Past Surgical History:   Procedure Laterality Date     APPENDECTOMY N/A 2019     CHOLECYSTECTOMY OPEN N/A 2018       PREVIOUS ENDOSCOPY:    None  ALLERGIES:     Allergies   Allergen Reactions     Cefdinir Hives     Tolerates Amoxicillin  Tolerates Amoxicillin       Cats Itching     Dust Mites      Other reaction(s): *Unknown       PERTINENT  "MEDICATIONS:    Current Outpatient Medications:      levothyroxine (SYNTHROID/LEVOTHROID) 125 MCG tablet, Take 1 tablet by mouth daily, Disp: , Rfl:      omeprazole (PRILOSEC) 20 MG DR capsule, Take 20 mg by mouth daily, Disp: , Rfl:      polyethylene glycol (MIRALAX) 17 GM/Dose powder, Take 1 capful by mouth daily, Disp: , Rfl:      Polysaccharide Iron Complex 434.8 (200 Fe) MG CAPS, Take 200 mg by mouth, Disp: , Rfl:     SOCIAL HISTORY:  Social History     Socioeconomic History     Marital status: Single     Spouse name: Not on file     Number of children: Not on file     Years of education: Not on file     Highest education level: Not on file   Occupational History     Not on file   Tobacco Use     Smoking status: Never Smoker     Smokeless tobacco: Never Used   Vaping Use     Vaping Use: Former   Substance and Sexual Activity     Alcohol use: Not on file     Drug use: Not on file     Sexual activity: Not on file   Other Topics Concern     Not on file   Social History Narrative     Not on file     Social Determinants of Health     Financial Resource Strain: Not on file   Food Insecurity: Not on file   Transportation Needs: Not on file   Physical Activity: Not on file   Stress: Not on file   Social Connections: Not on file   Intimate Partner Violence: Not on file   Housing Stability: Not on file       FAMILY HISTORY:    Father with sarcoidosis and NMDA receptor encephalitis    Past/family/social history reviewed and no changes    PHYSICAL EXAMINATION:  Constitutional: aaox3, cooperative, pleasant, not dyspneic/diaphoretic, no acute distress  Vitals reviewed: /78 (BP Location: Left arm, Patient Position: Sitting, Cuff Size: Adult Regular)   Pulse 65   Temp 97.1  F (36.2  C) (Oral)   Ht 1.6 m (5' 3\")   Wt 70.1 kg (154 lb 8 oz)   SpO2 97%   BMI 27.37 kg/m    Wt:   Wt Readings from Last 2 Encounters:   07/11/22 70.1 kg (154 lb 8 oz)   07/01/22 70.3 kg (155 lb)      Eyes: Sclera " anicteric/injected  Ears/nose/mouth/throat: Normal oropharynx without ulcers or exudate, mucus membranes moist, hearing intact  Neck: supple, thyroid normal size  CV: No edema  Respiratory: Unlabored breathing  Lymph: No axillary, submandibular, supraclavicular or inguinal lymphadenopathy  Abd:  Nondistended, no hepatosplenomegaly, nontender, no peritoneal signs  Skin: warm, perfused, no jaundice  Psych: Normal affect  MSK: Normal gait      PERTINENT STUDIES:  Most recent CBC:  Recent Labs   Lab Test 07/01/22  1146 10/23/19  1213   WBC 7.4 20.8*   HGB 13.4 13.8   HCT 40.4 40.0    302     Most recent hepatic panel:  Recent Labs   Lab Test 07/01/22  1146 10/23/19  1203   ALT 13 10   AST 14 12     Most recent creatinine:  Recent Labs   Lab Test 07/01/22  1146 10/23/19  1213   CR 0.76 0.86     CT 2019 - normal    ASSESSMENT/PLAN:    # abdominal pain, constipation - likely IBS-C - has been having BMs since starting miralax about a week ago but unfortunately pain has persisted.  Will check AXR to assess stool burden - further recommendations pending those results.  Will also check celiac serologies and inflammatory markers.  Will hold off on cross sectional imaging as she has had this completed in the past for similar symptoms but if persistent symptoms, may consider.     # history of TREY - attributed to heavy menses in the past but now with IUD.  Will check iron studies - if normal - can stop iron as maybe exacerbating her constipation    # hypothyroidism - recent TSH normal    # s/p cholecystectomy    # fibromyalgia    RTC 6 months or sooner if needed.      Afshan Chatman DO

## 2022-07-11 NOTE — NURSING NOTE
"Meseret Hatch's goals for this visit include:   Chief Complaint   Patient presents with     New Patient     New consult for constipation.     She requests these members of her care team be copied on today's visit information: PCP    PCP: Marika Varghese NP    Referring Provider:  Marika Varghese NP  [address] [place] [id number]        Vitals:    07/11/22 0902   BP: 119/78   BP Location: Left arm   Patient Position: Sitting   Cuff Size: Adult Regular   Pulse: 65   Temp: 97.1  F (36.2  C)   TempSrc: Oral   SpO2: 97%   Weight: 70.1 kg (154 lb 8 oz)   Height: 1.6 m (5' 3\")       Body mass index is 27.37 kg/m .    Do you need any medication refills at today's visit? No        Yolanda Rausch CMA    "

## 2022-07-12 LAB
IGA SERPL-MCNC: 187 MG/DL (ref 84–499)
TTG IGA SER-ACNC: 0.3 U/ML
TTG IGG SER-ACNC: 0.8 U/ML

## 2022-07-14 ENCOUNTER — OFFICE VISIT (OUTPATIENT)
Dept: FAMILY MEDICINE | Facility: CLINIC | Age: 22
End: 2022-07-14
Payer: COMMERCIAL

## 2022-07-14 VITALS
HEIGHT: 63 IN | BODY MASS INDEX: 27.11 KG/M2 | DIASTOLIC BLOOD PRESSURE: 83 MMHG | WEIGHT: 153 LBS | SYSTOLIC BLOOD PRESSURE: 136 MMHG | HEART RATE: 69 BPM

## 2022-07-14 DIAGNOSIS — Z11.4 SCREENING FOR HIV (HUMAN IMMUNODEFICIENCY VIRUS): ICD-10-CM

## 2022-07-14 DIAGNOSIS — Z11.59 NEED FOR HEPATITIS C SCREENING TEST: ICD-10-CM

## 2022-07-14 DIAGNOSIS — N93.9 VAGINAL SPOTTING: Primary | ICD-10-CM

## 2022-07-14 DIAGNOSIS — Z12.4 CERVICAL CANCER SCREENING: ICD-10-CM

## 2022-07-14 DIAGNOSIS — Z30.431 IUD CHECK UP: ICD-10-CM

## 2022-07-14 LAB
CLUE CELLS: NORMAL
HCV AB SERPL QL IA: NONREACTIVE
HIV 1+2 AB+HIV1 P24 AG SERPL QL IA: NONREACTIVE
TRICHOMONAS, WET PREP: NORMAL
WBC'S/HIGH POWER FIELD, WET PREP: NORMAL
YEAST, WET PREP: NORMAL

## 2022-07-14 PROCEDURE — 87491 CHLMYD TRACH DNA AMP PROBE: CPT | Performed by: NURSE PRACTITIONER

## 2022-07-14 PROCEDURE — 99213 OFFICE O/P EST LOW 20 MIN: CPT | Performed by: NURSE PRACTITIONER

## 2022-07-14 PROCEDURE — 87591 N.GONORRHOEAE DNA AMP PROB: CPT | Performed by: NURSE PRACTITIONER

## 2022-07-14 PROCEDURE — 87210 SMEAR WET MOUNT SALINE/INK: CPT | Mod: QW | Performed by: NURSE PRACTITIONER

## 2022-07-14 NOTE — PROGRESS NOTES
"  Assessment & Plan     (N93.9) Vaginal spotting  (primary encounter diagnosis)  Comment: Likely normal with progesterone IUD but will check for infections  Plan: Wet preparation, Chlamydia trachomatis PCR,         Neisseria gonorrhoeae PCR            (Z11.4) Screening for HIV (human immunodeficiency virus)  Comment: Agreeable to adding on HIV and hep C testing from blood work last week  Plan: REVIEW OF HEALTH MAINTENANCE PROTOCOL ORDERS,         HIV Antigen Antibody Combo            (Z11.59) Need for hepatitis C screening test  Comment:   Plan: REVIEW OF HEALTH MAINTENANCE PROTOCOL ORDERS,         Hepatitis C Screen Reflex to HCV RNA Quant and         Genotype            ((Z30.431) IUD check up  Comment: IUD appears in normal position string visible  Plan: Wet preparation, Chlamydia trachomatis PCR,         Neisseria gonorrhoeae PCR                       BMI:   Estimated body mass index is 27.1 kg/m  as calculated from the following:    Height as of this encounter: 1.6 m (5' 3\").    Weight as of this encounter: 69.4 kg (153 lb).           No follow-ups on file.    Marika Varghese NP  [facility]    Missy Carl is a 21 year old, presenting for the following health issues:    Has had Mirena IUD for little over a year.  Rarely has bleeding or spotting.  She has had 2 periods in the last week now and wants to make sure everything was normal.  She is not having cramping.  She has had a new partner in the last 3 months and is agreeable to STI testing as well.  She has a history of bacterial vaginosis.  She really does not have any vaginal discharge or itching and no pain with intercourse  Contraception (IUD string check )      HPI     Medication Followup of Mirena    Taking Medication as prescribed: yes    Side Effects:  None    Medication Helping Symptoms:  yes        Review of Systems         Objective    /83 (BP Location: Right arm, Patient Position: Sitting, Cuff Size: Adult " "Regular)   Pulse 69   Ht 1.6 m (5' 3\")   Wt 69.4 kg (153 lb)   BMI 27.10 kg/m    Body mass index is 27.1 kg/m .  Physical Exam   Alert and oriented  Pelvic exam reveals no inguinal lymphadenopathy  Old with normal rugated  Cervix is clear with no concerning discharge.  IUD strings visible approximately 3 cm from cervical os and bimanual reveals no cervical motion tenderness no adnexal tenderness or masses                      .  ..  "

## 2022-07-15 LAB
C TRACH DNA SPEC QL NAA+PROBE: NEGATIVE
N GONORRHOEA DNA SPEC QL NAA+PROBE: NEGATIVE

## 2022-07-16 ENCOUNTER — HEALTH MAINTENANCE LETTER (OUTPATIENT)
Age: 22
End: 2022-07-16

## 2022-09-18 ENCOUNTER — HEALTH MAINTENANCE LETTER (OUTPATIENT)
Age: 22
End: 2022-09-18

## 2022-10-21 PROBLEM — Z90.49 HISTORY OF CHOLECYSTECTOMY: Status: ACTIVE | Noted: 2022-10-21

## 2022-10-21 PROBLEM — E03.9 PRIMARY HYPOTHYROIDISM: Status: ACTIVE | Noted: 2019-09-10

## 2022-10-21 PROBLEM — D35.2 PITUITARY ADENOMA (H): Status: ACTIVE | Noted: 2019-07-31

## 2022-10-21 PROBLEM — R90.89 ABNORMAL BRAIN MRI: Status: ACTIVE | Noted: 2019-07-31

## 2022-12-05 DIAGNOSIS — E03.9 PRIMARY HYPOTHYROIDISM: Primary | ICD-10-CM

## 2022-12-05 RX ORDER — LEVOTHYROXINE SODIUM 125 UG/1
125 TABLET ORAL DAILY
Qty: 30 TABLET | Refills: 0 | Status: SHIPPED | OUTPATIENT
Start: 2022-12-05 | End: 2023-01-05

## 2022-12-05 NOTE — TELEPHONE ENCOUNTER
Medication Question or Refill        What medication are you calling about (include dose and sig)?:   levothyroxine (SYNTHROID/LEVOTHROID) 125 MCG tablet ()  1 tablet, DAILY          Summary: Take 1 tablet by mouth daily   Dose, Route, Frequency: 1 tablet, Oral, DAILY  Start: 2020  End: 2022              Controlled Substance Agreement on file:   CSA -- Patient Level:    CSA: None found at the patient level.       Who prescribed the medication?:  Abimael    Do you need a refill? Yes:     When did you use the medication last?   8 days ago    Patient offered an appointment? Yes:        Do you have any questions or concerns?  Yes: Patient is out Please refill today    [facility] Pharmacy[facility]   [id number] [address] [place] [address]  [address] [facility] [address]  Phone: [phone] Fax: [phone]      Could we send this information to you in [facility] or would you prefer to receive a phone call?:   Patient would prefer a phone call   Okay to leave a detailed message?: Yes at Cell number on file:    Telephone Information:   Mobile [phone]

## 2022-12-05 NOTE — TELEPHONE ENCOUNTER
"    Last Written Prescription Date:  Patient reported  Last office visit provider:  7/14/22    Requested Prescriptions   Pending Prescriptions Disp Refills     levothyroxine (SYNTHROID/LEVOTHROID) 125 MCG tablet 30 tablet 0     Sig: Take 1 tablet (125 mcg) by mouth daily       Thyroid Protocol Passed - 12/5/2022 10:44 AM        Passed - Patient is 12 years or older        Passed - Recent (12 mo) or future (30 days) visit within the authorizing provider's specialty     Patient has had an office visit with the authorizing provider or a provider within the authorizing providers department within the previous 12 mos or has a future within next 30 days. See \"Patient Info\" tab in inbasket, or \"Choose Columns\" in Meds & Orders section of the refill encounter.              Passed - Medication is active on med list        Passed - Normal TSH on file in past 12 months     Recent Labs   Lab Test 07/01/22  1146   TSH 1.56              Passed - No active pregnancy on record     If patient is pregnant or has had a positive pregnancy test, please check TSH.          Passed - No positive pregnancy test in past 12 months     If patient is pregnant or has had a positive pregnancy test, please check TSH.               MALDONADO GONZALES RN 12/05/22 12:16 PM    "

## 2023-01-04 ENCOUNTER — OFFICE VISIT (OUTPATIENT)
Dept: FAMILY MEDICINE | Facility: CLINIC | Age: 23
End: 2023-01-04
Payer: MEDICAID

## 2023-01-04 ENCOUNTER — MYC MEDICAL ADVICE (OUTPATIENT)
Dept: FAMILY MEDICINE | Facility: CLINIC | Age: 23
End: 2023-01-04

## 2023-01-04 VITALS
BODY MASS INDEX: 27.54 KG/M2 | TEMPERATURE: 97.7 F | SYSTOLIC BLOOD PRESSURE: 116 MMHG | WEIGHT: 155.4 LBS | OXYGEN SATURATION: 99 % | HEIGHT: 63 IN | DIASTOLIC BLOOD PRESSURE: 72 MMHG | HEART RATE: 84 BPM

## 2023-01-04 DIAGNOSIS — Z23 NEED FOR COVID-19 VACCINE: ICD-10-CM

## 2023-01-04 DIAGNOSIS — Z23 FLU VACCINE NEED: ICD-10-CM

## 2023-01-04 DIAGNOSIS — E03.9 PRIMARY HYPOTHYROIDISM: Primary | ICD-10-CM

## 2023-01-04 DIAGNOSIS — J45.20 MILD INTERMITTENT ASTHMA WITHOUT COMPLICATION: ICD-10-CM

## 2023-01-04 PROBLEM — Z91.09 HOUSE DUST MITE ALLERGY: Status: ACTIVE | Noted: 2019-12-20

## 2023-01-04 LAB
T4 FREE SERPL-MCNC: 1.39 NG/DL (ref 0.9–1.7)
TSH SERPL DL<=0.005 MIU/L-ACNC: 5.48 UIU/ML (ref 0.3–4.2)

## 2023-01-04 PROCEDURE — 84443 ASSAY THYROID STIM HORMONE: CPT | Performed by: NURSE PRACTITIONER

## 2023-01-04 PROCEDURE — 0134A COVID-19 VACCINE BIVALENT BOOSTER 18+ (MODERNA): CPT | Performed by: NURSE PRACTITIONER

## 2023-01-04 PROCEDURE — 99214 OFFICE O/P EST MOD 30 MIN: CPT | Mod: 25 | Performed by: NURSE PRACTITIONER

## 2023-01-04 PROCEDURE — 90471 IMMUNIZATION ADMIN: CPT | Performed by: NURSE PRACTITIONER

## 2023-01-04 PROCEDURE — 90686 IIV4 VACC NO PRSV 0.5 ML IM: CPT | Performed by: NURSE PRACTITIONER

## 2023-01-04 PROCEDURE — 84439 ASSAY OF FREE THYROXINE: CPT | Performed by: NURSE PRACTITIONER

## 2023-01-04 PROCEDURE — 91313 COVID-19 VACCINE BIVALENT BOOSTER 18+ (MODERNA): CPT | Performed by: NURSE PRACTITIONER

## 2023-01-04 PROCEDURE — 36415 COLL VENOUS BLD VENIPUNCTURE: CPT | Performed by: NURSE PRACTITIONER

## 2023-01-04 RX ORDER — INHALER, ASSIST DEVICES
SPACER (EA) MISCELLANEOUS
Qty: 1 EACH | Refills: 1 | Status: SHIPPED | OUTPATIENT
Start: 2023-01-04

## 2023-01-04 RX ORDER — ALBUTEROL SULFATE 90 UG/1
2 AEROSOL, METERED RESPIRATORY (INHALATION) EVERY 6 HOURS PRN
Qty: 18 G | Refills: 11 | Status: SHIPPED | OUTPATIENT
Start: 2023-01-04 | End: 2023-12-13

## 2023-01-04 ASSESSMENT — ASTHMA QUESTIONNAIRES
QUESTION_4 LAST FOUR WEEKS HOW OFTEN HAVE YOU USED YOUR RESCUE INHALER OR NEBULIZER MEDICATION (SUCH AS ALBUTEROL): NOT AT ALL
QUESTION_2 LAST FOUR WEEKS HOW OFTEN HAVE YOU HAD SHORTNESS OF BREATH: ONCE OR TWICE A WEEK
QUESTION_1 LAST FOUR WEEKS HOW MUCH OF THE TIME DID YOUR ASTHMA KEEP YOU FROM GETTING AS MUCH DONE AT WORK, SCHOOL OR AT HOME: NONE OF THE TIME
ACT_TOTALSCORE: 22
QUESTION_5 LAST FOUR WEEKS HOW WOULD YOU RATE YOUR ASTHMA CONTROL: SOMEWHAT CONTROLLED
QUESTION_3 LAST FOUR WEEKS HOW OFTEN DID YOUR ASTHMA SYMPTOMS (WHEEZING, COUGHING, SHORTNESS OF BREATH, CHEST TIGHTNESS OR PAIN) WAKE YOU UP AT NIGHT OR EARLIER THAN USUAL IN THE MORNING: NOT AT ALL
ACT_TOTALSCORE: 22

## 2023-01-04 NOTE — PROGRESS NOTES
"  Assessment & Plan     (E03.9) Primary hypothyroidism  (primary encounter diagnosis)  Comment: [facility].  Hypothyroidism diagnosed in 2019 and has remained on the same dose of levothyroxine.  She took her last dose yesterday.  Would like to do labs today and get a refill for 1 year.  Feels well  Plan: TSH with free T4 reflex            (J45.20) Mild intermittent asthma without complication  Comment: Has noticed since she had COVID infection about a year ago that she now gets short of breath and has an asthma attack when she exercises.  Prior to that she had not needed an albuterol inhaler for many years.  She would like an inhaler to have on hand for exercise now.  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    Weight as of this encounter: 70.5 kg (155 lb 6.4 oz).           No follow-ups on file.    Marika Varghese NP  [facility]    Missy Carl is a 22 year old presenting for the following health issues: needs a refill on thyroid medication. Last TSH 7/1/22  Thyroid Problem and Refill Request      History of Present Illness       Hypothyroidism:     " "Since last visit, patient describes the following symptoms::  Constipation    She eats 0-1 servings of fruits and vegetables daily.She consumes 0 sweetened beverage(s) daily.She exercises with enough effort to increase her heart rate 30 to 60 minutes per day.  She exercises with enough effort to increase her heart rate 5 days per week.   She is taking medications regularly.     ACT Total Scores 1/4/2023   ACT TOTAL SCORE (Goal Greater than or Equal to 20) 22   In the past 12 months, how many times did you visit the emergency room for your asthma without being admitted to the hospital? 0   In the past 12 months, how many times were you hospitalized overnight because of your asthma? 0           Review of Systems         Objective    /72 (BP Location: Right arm, Patient Position: Sitting)   Pulse 84   Temp 97.7  F (36.5  C)   Ht 1.6 m (5' 3\")   Wt 70.5 kg (155 lb 6.4 oz)   SpO2 99%   BMI 27.53 kg/m    Body mass index is 27.53 kg/m .  Physical Exam   GENERAL: healthy, alert and no distress  EYES: Eyes grossly normal to inspection, PERRL and conjunctivae and sclerae normal  NECK: no adenopathy, no asymmetry, masses, or scars and thyroid normal to palpation  RESP: lungs clear to auscultation - no rales, rhonchi or wheezes  CV: regular rate and rhythm, normal S1 S2, no S3 or S4, no murmur, click or rub, no peripheral edema and peripheral pulses strong  MS: no gross musculoskeletal defects noted, no edema                    "

## 2023-01-04 NOTE — LETTER
My Asthma Action Plan    Name: Meseret Hatch   YOB: 2000  Date: 1/4/2023   My doctor: Marika Varghese NP   My clinic: [facility]        My Rescue Medicine:   Albuterol inhaler (Proair/Ventolin/Proventil HFA)  2-4 puffs EVERY 4 HOURS as needed. Use a spacer if recommended by your provider.   My Asthma Severity:   Intermittent / Exercise Induced  Know your asthma triggers: exercise or sports             GREEN ZONE   Good Control    I feel good    No cough or wheeze    Can work, sleep and play without asthma symptoms       Take your asthma control medicine every day.     1. If exercise triggers your asthma, take your rescue medication    15 minutes before exercise or sports, and    During exercise if you have asthma symptoms  2. Spacer to use with inhaler: If you have a spacer, make sure to use it with your inhaler             YELLOW ZONE Getting Worse  I have ANY of these:    I do not feel good    Cough or wheeze    Chest feels tight    Wake up at night   1. Keep taking your Green Zone medications  2. Start taking your rescue medicine:    every 20 minutes for up to 1 hour. Then every 4 hours for 24-48 hours.  3. If you stay in the Yellow Zone for more than 12-24 hours, contact your doctor.  4. If you do not return to the Green Zone in 12-24 hours or you get worse, start taking your oral steroid medicine if prescribed by your provider.           RED ZONE Medical Alert - Get Help  I have ANY of these:    I feel awful    Medicine is not helping    Breathing getting harder    Trouble walking or talking    Nose opens wide to breathe       1. Take your rescue medicine NOW  2. If your provider has prescribed an oral steroid medicine, start taking it NOW  3. Call your doctor NOW  4. If you are still in the Red Zone after 20 minutes and you have not reached your doctor:    Take your rescue medicine again and    Call 911 or go to the emergency room right away    See your regular  doctor within 2 weeks of an Emergency Room or Urgent Care visit for follow-up treatment.          Annual Reminders:  Meet with Asthma Educator,  Flu Shot in the Fall, consider Pneumonia Vaccination for patients with asthma (aged 19 and older).    Pharmacy: [id number] [address] AT [facility] & [address]    Electronically signed by Marika Varghese NP   Date: 01/04/23                    Asthma Triggers  How To Control Things That Make Your Asthma Worse    Triggers are things that make your asthma worse.  Look at the list below to help you find your triggers and   what you can do about them. You can help prevent asthma flare-ups by staying away from your triggers.      Trigger                                                          What you can do   Cigarette Smoke  Tobacco smoke can make asthma worse. Do not allow smoking in your home, car or around you.  Be sure no one smokes at a child s day care or school.  If you smoke, ask your health care provider for ways to help you quit.  Ask family members to quit too.  Ask your health care provider for a referral to Quit Plan to help you quit smoking, or call 2-658-551-PLAN.     Colds, Flu, Bronchitis  These are common triggers of asthma. Wash your hands often.  Don t touch your eyes, nose or mouth.  Get a flu shot every year.     Dust Mites  These are tiny bugs that live in cloth or carpet. They are too small to see. Wash sheets and blankets in hot water every week.   Encase pillows and mattress in dust mite proof covers.  Avoid having carpet if you can. If you have carpet, vacuum weekly.   Use a dust mask and HEPA vacuum.   Pollen and Outdoor Mold  Some people are allergic to trees, grass, or weed pollen, or molds. Try to keep your windows closed.  Limit time out doors when pollen count is high.   Ask you health care provider about taking medicine during allergy season.     Animal Dander  Some people are allergic to skin flakes, urine  or saliva from pets with fur or feathers. Keep pets with fur or feathers out of your home.    If you can t keep the pet outdoors, then keep the pet out of your bedroom.  Keep the bedroom door closed.  Keep pets off cloth furniture and away from stuffed toys.     Mice, Rats, and Cockroaches  Some people are allergic to the waste from these pests.   Cover food and garbage.  Clean up spills and food crumbs.  Store grease in the refrigerator.   Keep food out of the bedroom.   Indoor Mold  This can be a trigger if your home has high moisture. Fix leaking faucets, pipes, or other sources of water.   Clean moldy surfaces.  Dehumidify basement if it is damp and smelly.   Smoke, Strong Odors, and Sprays  These can reduce air quality. Stay away from strong odors and sprays, such as perfume, powder, hair spray, paints, smoke incense, paint, cleaning products, candles and new carpet.   Exercise or Sports  Some people with asthma have this trigger. Be active!  Ask your doctor about taking medicine before sports or exercise to prevent symptoms.    Warm up for 5-10 minutes before and after sports or exercise.     Other Triggers of Asthma  Cold air:  Cover your nose and mouth with a scarf.  Sometimes laughing or crying can be a trigger.  Some medicines and food can trigger asthma.

## 2023-01-05 RX ORDER — LEVOTHYROXINE SODIUM 125 UG/1
125 TABLET ORAL DAILY
Qty: 90 TABLET | Refills: 3 | Status: SHIPPED | OUTPATIENT
Start: 2023-01-05 | End: 2023-12-14

## 2023-06-30 ENCOUNTER — NURSE TRIAGE (OUTPATIENT)
Dept: NURSING | Facility: CLINIC | Age: 23
End: 2023-06-30
Payer: COMMERCIAL

## 2023-06-30 NOTE — TELEPHONE ENCOUNTER
Patient calling.     IUD concerns.    She reports that about a month ago, she was getting random jolts of pain in her lower abdomen.  She states that the pain last for a few seconds at a time over the course for a few hours. Initially had been occurring inconsistently.    Now, she reports that the pain is getting more regular rated 4/10, but when she gets the jolts of pain it is 9/10. She does not have any pain right now.  She has also noticed that when she tightens her abs, she feels discomfort.     Also has been spotting that started 5-6 days ago, some mild dizziness at times but it does not interfere with her normal activities.    Disposition: See in office today or tomorrow. Care advice given. Warm transferred to scheduling.    Samantha Manriquez RN on 6/30/2023 at 9:31 AM    Reason for Disposition    MILD abdominal pain (e.g., does not interfere with normal activities) that comes and goes (cramps) and present > 48 hours    Additional Information    Negative: Shock suspected (e.g., cold/pale/clammy skin, too weak to stand, low BP, rapid pulse)    Negative: Sounds like a life-threatening emergency to the triager    Negative: SEVERE abdominal pain (e.g., excruciating) and present > 1 hour    Negative: SEVERE vaginal bleeding (e.g., soaking 2 pads or tampons per hour and present 2 or more hours; 1 menstrual cup every 2 hours)    Negative: SEVERE dizziness (e.g., unable to stand, requires support to walk, feels like passing out now)    Negative: Patient sounds very sick or weak to the triager    Negative: MODERATE vaginal bleeding (e.g., soaking 1 pad or tampon per hour and present > 6 hours; 1 menstrual cup every 6 hours)    Negative: Vaginal bleeding is WORSE than normal (e.g., heavier) and dizziness or lightheadedness    Negative: Constant abdominal pain and present > 2 hours    Negative: Caller has URGENT question and triager unable to answer question    Protocols used: CONTRACEPTION - IUD SYMPTOMS AND  [id number]

## 2023-07-30 ENCOUNTER — HEALTH MAINTENANCE LETTER (OUTPATIENT)
Age: 23
End: 2023-07-30

## 2023-09-20 ENCOUNTER — OFFICE VISIT (OUTPATIENT)
Dept: FAMILY MEDICINE | Facility: CLINIC | Age: 23
End: 2023-09-20
Payer: COMMERCIAL

## 2023-09-20 VITALS
DIASTOLIC BLOOD PRESSURE: 76 MMHG | OXYGEN SATURATION: 98 % | HEART RATE: 84 BPM | HEIGHT: 63 IN | WEIGHT: 160.8 LBS | SYSTOLIC BLOOD PRESSURE: 120 MMHG | TEMPERATURE: 98.4 F | BODY MASS INDEX: 28.49 KG/M2

## 2023-09-20 DIAGNOSIS — R41.840 ATTENTION AND CONCENTRATION DEFICIT: Primary | ICD-10-CM

## 2023-09-20 DIAGNOSIS — Z23 FLU VACCINE NEED: ICD-10-CM

## 2023-09-20 PROCEDURE — 90471 IMMUNIZATION ADMIN: CPT | Performed by: NURSE PRACTITIONER

## 2023-09-20 PROCEDURE — 99213 OFFICE O/P EST LOW 20 MIN: CPT | Mod: 25 | Performed by: NURSE PRACTITIONER

## 2023-09-20 PROCEDURE — 90686 IIV4 VACC NO PRSV 0.5 ML IM: CPT | Performed by: NURSE PRACTITIONER

## 2023-09-20 ASSESSMENT — ASTHMA QUESTIONNAIRES: ACT_TOTALSCORE: 23

## 2023-09-20 NOTE — PROGRESS NOTES
"  Assessment & Plan     (O97.219) Attention and concentration deficit  (primary encounter diagnosis)  Comment: no hx of formal ADD dx despite her efforts a few years back to be evaluated. She has done well in school till virtual school and now she can't stay on focus for that. Would like order for formal testing. No ADD med use in the past. No current mental health dx  Plan:     (Z23) Flu vaccine need  Comment:   Plan: Adult Mental Health  Referral                     BMI:   Estimated body mass index is 28.48 kg/m  as calculated from the following:    Height as of this encounter: 1.6 m (5' 3\").    Weight as of this encounter: 72.9 kg (160 lb 12.8 oz).           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  Will start at [employer] in person in the spring.for nursing  A.D.H.D and Attention Deficit Disorder        9/20/2023     9:00 AM   Additional Questions   Roomed by collin bonds   Accompanied by self         A.D.H.MIRNA    History of Present Illness       Reason for visit:  Discuss ADHD concerns  Symptom onset:  More than a month  Symptom intensity:  Moderate  Symptom progression:  Worsening  Had these symptoms before:  Yes  Has tried/received treatment for these symptoms:  No    She eats 2-3 servings of fruits and vegetables daily.She consumes 0 sweetened beverage(s) daily.She exercises with enough effort to increase her heart rate 20 to 29 minutes per day.  She exercises with enough effort to increase her heart rate 4 days per week. She is missing 1 dose(s) of medications per week.  She is not " "taking prescribed medications regularly due to remembering to take.               1/4/2023     7:42 AM 9/20/2023     8:52 AM   ACT Total Scores   ACT TOTAL SCORE (Goal Greater than or Equal to 20) 22 23   In the past 12 months, how many times did you visit the emergency room for your asthma without being admitted to the hospital? 0 0   In the past 12 months, how many times were you hospitalized overnight because of your asthma? 0 0      Review of Systems         Objective    /76 (BP Location: Right arm, Patient Position: Sitting)   Pulse 84   Temp 98.4  F (36.9  C)   Ht 1.6 m (5' 3\")   Wt 72.9 kg (160 lb 12.8 oz)   LMP  (LMP Unknown)   SpO2 98%   BMI 28.48 kg/m    Body mass index is 28.48 kg/m .  Physical Exam   A) NAD, good eye contact and clear speech                        "

## 2023-09-25 ENCOUNTER — TELEPHONE (OUTPATIENT)
Dept: FAMILY MEDICINE | Facility: CLINIC | Age: 23
End: 2023-09-25
Payer: COMMERCIAL

## 2023-09-25 DIAGNOSIS — R41.840 ATTENTION AND CONCENTRATION DEFICIT: Primary | ICD-10-CM

## 2023-09-25 NOTE — TELEPHONE ENCOUNTER
General Call    Contacts         Type Contact Phone/Fax    09/25/2023 11:50 AM CDT Phone (Incoming) Meseret Hatch (Self) [phone] (M)          Reason for Call:  Patient called because the neuropsych provider that she was referred to is not currently taking new appointments; patient is requesting assistance finding a different provider that is taking appts.    Could we send this information to you in [employer] or would you prefer to receive a phone call?:   Patient would prefer a phone call     Okay to leave a detailed message?: Yes at Cell number on file:    Telephone Information:   Mobile [phone]

## 2023-09-25 NOTE — TELEPHONE ENCOUNTER
Thank you for reaching out!  The patient has an open access plan and can be seen by a provider of their choice.  Please enter the referral into [facility].    Thank you,     [facility] Referrals Coordinator

## 2023-09-26 ENCOUNTER — TELEPHONE (OUTPATIENT)
Dept: NEUROPSYCHOLOGY | Facility: CLINIC | Age: 23
End: 2023-09-26
Payer: COMMERCIAL

## 2023-09-26 NOTE — CONFIDENTIAL NOTE
Neuropsych referral declined- ref prov notified.      Unfortunately, at this time our clinic does not see patients for diagnostic questions related to learning disabilities, ADHD, and/or autism spectrum disorders. In short, we do not currently perform assessments solely for the retrospective diagnosis of developmental conditions in adults.     Possible referral options for your patient could include:    - [facility] - with locations throughout the [place] area: [phone].    - [facility] - with locations throughout the [place] area: [phone].    Other resources:    - [employer] - you can search for providers in your community who offer ADHD assessments.    - Contact your insurance company for additional options.    China Hudson   Psychometrist

## 2023-09-27 NOTE — PROGRESS NOTES
Patient notified to expect a call within the next day or two to help her with scheduling at a [place] location.

## 2023-09-27 NOTE — PROGRESS NOTES
We were having trouble finding a place to do neuro psych testing so I placed another referral and asked Nadiya To find a place located fairly close to [place]. She has faxed the order to [facility] located in [place]. , please let patient know to expect a call from them, thank you.

## 2023-11-22 ENCOUNTER — TELEPHONE (OUTPATIENT)
Dept: FAMILY MEDICINE | Facility: CLINIC | Age: 23
End: 2023-11-22
Payer: COMMERCIAL

## 2023-11-22 NOTE — TELEPHONE ENCOUNTER
General Call    Contacts         Type Contact Phone/Fax    11/22/2023 03:40 PM CST Phone (Incoming) Meseret Hatch (Self) [phone] (M)          Reason for Call:  [facility] is full for 2024.     What are your questions or concerns:  Patient has not heard from the second referral place as discussed with Provider Marika Varghese and was wondering why she has not yet received a call. Writer gave patient the number for her to call as documented on referral if patient does not hear back, but patient declined to do this stating she wanted to leave a message for Mariak Varghese    Date of last appointment with provider: 9.20.23-OV/discuss ADHD-Marika Varghese    Could we send this information to you in [facility] or would you prefer to receive a phone call?:   Patient would prefer a phone call   Okay to leave a detailed message?: Yes at Cell number on file:    Telephone Information:   Mobile [phone]

## 2023-11-24 NOTE — TELEPHONE ENCOUNTER
Spoke to patient regarding below and Marika's response.         Patient states she didn't receive the number for the second referral place. Writer provided patient with the number for [facility] [phone] per Neuropsych note from 9/26/23. Patient states that she will call them to see how their schedule looks since [facility] is full for 2024.     Routing to referral team for them to call patient.

## 2023-12-13 ENCOUNTER — OFFICE VISIT (OUTPATIENT)
Dept: FAMILY MEDICINE | Facility: CLINIC | Age: 23
End: 2023-12-13
Payer: COMMERCIAL

## 2023-12-13 VITALS
DIASTOLIC BLOOD PRESSURE: 64 MMHG | HEART RATE: 77 BPM | TEMPERATURE: 97.8 F | HEIGHT: 63 IN | WEIGHT: 156.9 LBS | SYSTOLIC BLOOD PRESSURE: 116 MMHG | BODY MASS INDEX: 27.8 KG/M2 | OXYGEN SATURATION: 98 %

## 2023-12-13 DIAGNOSIS — Z11.1 SCREENING EXAMINATION FOR PULMONARY TUBERCULOSIS: Primary | ICD-10-CM

## 2023-12-13 DIAGNOSIS — J45.20 MILD INTERMITTENT ASTHMA WITHOUT COMPLICATION: ICD-10-CM

## 2023-12-13 DIAGNOSIS — Z23 NEED FOR VACCINATION: ICD-10-CM

## 2023-12-13 DIAGNOSIS — Z02.0 SCHOOL PHYSICAL EXAM: ICD-10-CM

## 2023-12-13 DIAGNOSIS — E03.9 PRIMARY HYPOTHYROIDISM: ICD-10-CM

## 2023-12-13 PROBLEM — Z97.5 IUD (INTRAUTERINE DEVICE) IN PLACE: Chronic | Status: ACTIVE | Noted: 2023-10-19

## 2023-12-13 PROCEDURE — 91320 SARSCV2 VAC 30MCG TRS-SUC IM: CPT | Performed by: NURSE PRACTITIONER

## 2023-12-13 PROCEDURE — 90480 ADMN SARSCOV2 VAC 1/ONLY CMP: CPT | Performed by: NURSE PRACTITIONER

## 2023-12-13 PROCEDURE — 84443 ASSAY THYROID STIM HORMONE: CPT | Performed by: NURSE PRACTITIONER

## 2023-12-13 PROCEDURE — 99213 OFFICE O/P EST LOW 20 MIN: CPT | Performed by: NURSE PRACTITIONER

## 2023-12-13 PROCEDURE — 36415 COLL VENOUS BLD VENIPUNCTURE: CPT | Performed by: NURSE PRACTITIONER

## 2023-12-13 PROCEDURE — 86580 TB INTRADERMAL TEST: CPT | Performed by: NURSE PRACTITIONER

## 2023-12-13 RX ORDER — ALBUTEROL SULFATE 90 UG/1
2 AEROSOL, METERED RESPIRATORY (INHALATION) EVERY 4 HOURS PRN
Qty: 18 G | Refills: 11 | Status: SHIPPED | OUTPATIENT
Start: 2023-12-13

## 2023-12-13 ASSESSMENT — ENCOUNTER SYMPTOMS
SORE THROAT: 0
DIARRHEA: 0
PARESTHESIAS: 0
JOINT SWELLING: 0
DYSURIA: 0
EYE PAIN: 0
FREQUENCY: 0
ARTHRALGIAS: 0
NERVOUS/ANXIOUS: 0
COUGH: 0
SHORTNESS OF BREATH: 0
HEARTBURN: 0
NAUSEA: 1
WEAKNESS: 0
HEMATOCHEZIA: 0
HEMATURIA: 0
CONSTIPATION: 1
DIZZINESS: 0
PALPITATIONS: 0
ABDOMINAL PAIN: 0
CHILLS: 0
FEVER: 0
MYALGIAS: 0
HEADACHES: 0

## 2023-12-13 NOTE — PROGRESS NOTES
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[employer]    Needs 2 step mantoux, has been negative in the past    Would like tsh as levothyroxine refill needed, running low    Needs COVID booster    Had pap June 2021, normal, not due yet  Asthma well controlled  Physical        12/13/2023     2:50 PM   Additional Questions   Roomed by collin bonds   Accompanied by self     Healthy Habits:     Getting at least 3 servings of Calcium per day:  Yes    Bi-annual eye exam:  Yes    Dental care twice a year:  NO    Sleep apnea or symptoms of sleep apnea:  Daytime drowsiness    Diet:  Regular (no restrictions)    Frequency of exercise:  1 day/week    Duration of exercise:  15-30 minutes    Taking medications regularly:  Yes    Medication side effects:  None    Additional concerns today:  No            Have you ever done Advance Care Planning? (For example, a Health Directive, POLST, or a discussion with a medical provider or your loved ones about your wishes): No, advance care planning information given to patient to review.  Advanced care planning was discussed at today's visit.    Social History     Tobacco Use    Smoking status: Never     Passive exposure: Never    Smokeless tobacco: Never   Substance Use Topics    Alcohol use: Yes     Comment: \"minimual\"             12/13/2023     2:43 PM   Alcohol Use   Prescreen: >3 drinks/day or >7 drinks/week? No           1/4/2023     7:42 AM 9/20/2023     8:52 AM   ACT Total Scores   ACT TOTAL SCORE (Goal Greater than or Equal to 20) 22 23   In the past 12 months, how many times did you visit the emergency room for your asthma without being admitted to the hospital? 0 0   In the past 12 months, how many times were you hospitalized overnight because of your asthma? 0 0      Reviewed orders with patient.  Reviewed health " "maintenance and updated orders accordingly - Yes  Lab work is in process    Breast Cancer Screening:        History of abnormal Pap smear: NO - age 21-29 PAP every 3 years recommended     Reviewed and updated as needed this visit by clinical staff   Tobacco  Allergies  Meds              Reviewed and updated as needed this visit by Provider                     Review of Systems   Constitutional:  Negative for chills and fever.   HENT:  Negative for congestion, ear pain, hearing loss and sore throat.    Eyes:  Negative for pain and visual disturbance.   Respiratory:  Negative for cough and shortness of breath.    Cardiovascular:  Negative for chest pain, palpitations and peripheral edema.   Gastrointestinal:  Positive for constipation and nausea. Negative for abdominal pain, diarrhea, heartburn and hematochezia.   Genitourinary:  Negative for dysuria, frequency, genital sores, hematuria and urgency.   Musculoskeletal:  Negative for arthralgias, joint swelling and myalgias.   Skin:  Negative for rash.   Neurological:  Negative for dizziness, weakness, headaches and paresthesias.   Psychiatric/Behavioral:  Negative for mood changes. The patient is not nervous/anxious.           OBJECTIVE:   /64 (BP Location: Right arm, Patient Position: Sitting)   Pulse 77   Temp 97.8  F (36.6  C)   Ht 1.594 m (5' 2.75\")   Wt 71.2 kg (156 lb 14.4 oz)   LMP  (LMP Unknown)   SpO2 98%   Breastfeeding No   BMI 28.02 kg/m    Physical Exam  GENERAL: healthy, alert and no distress  EYES: Eyes grossly normal to inspection, PERRL and conjunctivae and sclerae normal  HENT: ear canals and TM's normal, nose and mouth without ulcers or lesions  NECK: no adenopathy, no asymmetry, masses, or scars and thyroid normal to palpation  RESP: lungs clear to auscultation - no rales, rhonchi or wheezes  CV: regular rate and rhythm, normal S1 S2, no S3 or S4, no murmur, click or rub, no peripheral edema and peripheral pulses strong  ABDOMEN: " "soft, nontender, no hepatosplenomegaly, no masses and bowel sounds normal  MS: no gross musculoskeletal defects noted, no edema  PSYCH: mentation appears normal, affect normal/bright        ASSESSMENT/PLAN:     (Z23) Need for vaccination  Comment:   Plan: COVID-19 12+ (2023-24) (PFIZER)            (Z02.0) School physical exam  Comment:   Plan:   Mantoux and vaccines today as required by school physical             COUNSELING:  Reviewed preventive health counseling, as reflected in patient instructions       mantoux      BMI:   Estimated body mass index is 28.02 kg/m  as calculated from the following:    Height as of this encounter: 1.594 m (5' 2.75\").    Weight as of this encounter: 71.2 kg (156 lb 14.4 oz).         She reports that she has never smoked. She has never been exposed to tobacco smoke. She has never used smokeless tobacco.          Marika Varghese NP  [facility]  "

## 2023-12-14 LAB — TSH SERPL DL<=0.005 MIU/L-ACNC: 2.97 UIU/ML (ref 0.3–4.2)

## 2023-12-14 RX ORDER — LEVOTHYROXINE SODIUM 125 UG/1
125 TABLET ORAL DAILY
Qty: 90 TABLET | Refills: 3 | Status: SHIPPED | OUTPATIENT
Start: 2023-12-14 | End: 2023-12-18

## 2023-12-15 ENCOUNTER — ALLIED HEALTH/NURSE VISIT (OUTPATIENT)
Dept: FAMILY MEDICINE | Facility: CLINIC | Age: 23
End: 2023-12-15
Payer: COMMERCIAL

## 2023-12-15 DIAGNOSIS — Z11.1 SCREENING EXAMINATION FOR PULMONARY TUBERCULOSIS: Primary | ICD-10-CM

## 2023-12-15 LAB
PPDINDURATION: 0 MM (ref 0–4.99)
PPDREDNESS: NORMAL

## 2023-12-15 PROCEDURE — 99207 PR NO CHARGE NURSE ONLY: CPT

## 2023-12-15 NOTE — PROGRESS NOTES
Patient is here today for a Mantoux (TST) test results.    Did patient return to clinic 48-72 hours from Mantoux (TST) placement: Yes -     PPD Induration   Date Value Ref Range Status   12/15/2023 0 0 - 4.99 mm Final     PPD Redness   Date Value Ref Range Status   12/15/2023 Not Present  Final           Induration Size? Induration <5mm - Enter results in Enter/Edit Activity. Route results to ordering provider.     Patient needs form signed? Yes. Follow clinic form process.     Patient reports having previously had the BCG Vaccine: No    Does patient need a two step?  Yes - placed order according to standing order or notified LP for need for next TST.  Instructed patient when to return to the clinic.

## 2023-12-18 RX ORDER — LEVOTHYROXINE SODIUM 125 UG/1
125 TABLET ORAL DAILY
Qty: 90 TABLET | Refills: 3 | Status: SHIPPED | OUTPATIENT
Start: 2023-12-18

## 2023-12-25 ENCOUNTER — MYC MEDICAL ADVICE (OUTPATIENT)
Dept: FAMILY MEDICINE | Facility: CLINIC | Age: 23
End: 2023-12-25
Payer: COMMERCIAL

## 2023-12-25 DIAGNOSIS — N89.8 VAGINAL ITCHING: Primary | ICD-10-CM

## 2023-12-26 ENCOUNTER — ALLIED HEALTH/NURSE VISIT (OUTPATIENT)
Dept: FAMILY MEDICINE | Facility: CLINIC | Age: 23
End: 2023-12-26
Payer: COMMERCIAL

## 2023-12-26 DIAGNOSIS — Z11.1 VISIT FOR MANTOUX TEST: Primary | ICD-10-CM

## 2023-12-26 PROCEDURE — 86580 TB INTRADERMAL TEST: CPT

## 2023-12-26 PROCEDURE — 99207 PR NO CHARGE NURSE ONLY: CPT

## 2023-12-29 ENCOUNTER — ALLIED HEALTH/NURSE VISIT (OUTPATIENT)
Dept: FAMILY MEDICINE | Facility: CLINIC | Age: 23
End: 2023-12-29
Payer: COMMERCIAL

## 2023-12-29 ENCOUNTER — LAB (OUTPATIENT)
Dept: LAB | Facility: CLINIC | Age: 23
End: 2023-12-29
Payer: COMMERCIAL

## 2023-12-29 DIAGNOSIS — N89.8 VAGINAL ITCHING: ICD-10-CM

## 2023-12-29 DIAGNOSIS — Z11.1 VISIT FOR MANTOUX TEST: Primary | ICD-10-CM

## 2023-12-29 LAB
C TRACH DNA SPEC QL NAA+PROBE: NEGATIVE
CLUE CELLS: ABNORMAL
N GONORRHOEA DNA SPEC QL NAA+PROBE: NEGATIVE
PPDINDURATION: 0 MM (ref 0–4.99)
PPDREDNESS: NORMAL
TRICHOMONAS, WET PREP: ABNORMAL
WBC'S/HIGH POWER FIELD, WET PREP: ABNORMAL
YEAST, WET PREP: PRESENT

## 2023-12-29 PROCEDURE — 87591 N.GONORRHOEAE DNA AMP PROB: CPT

## 2023-12-29 PROCEDURE — 99207 PR NO CHARGE NURSE ONLY: CPT

## 2023-12-29 PROCEDURE — 87491 CHLMYD TRACH DNA AMP PROBE: CPT

## 2023-12-29 PROCEDURE — 87210 SMEAR WET MOUNT SALINE/INK: CPT | Mod: QW | Performed by: NURSE PRACTITIONER

## 2023-12-29 NOTE — PROGRESS NOTES
Patient is here today for a Mantoux (TST) test results.    Did patient return to clinic 48-72 hours from Mantoux (TST) placement: Yes -     PPD Induration   Date Value Ref Range Status   12/29/2023 0 0 - 4.99 mm Final     PPD Redness   Date Value Ref Range Status   12/29/2023 Not Present  Final           Induration Size? Induration <5mm - Enter results in Enter/Edit Activity. Route results to ordering provider.     Patient needs form signed? Yes. Follow clinic form process.     Patient reports having previously had the BCG Vaccine: No    Does patient need a two step? No

## 2023-12-30 ENCOUNTER — TELEPHONE (OUTPATIENT)
Dept: FAMILY MEDICINE | Facility: CLINIC | Age: 23
End: 2023-12-30
Payer: COMMERCIAL

## 2023-12-30 ENCOUNTER — NURSE TRIAGE (OUTPATIENT)
Dept: NURSING | Facility: CLINIC | Age: 23
End: 2023-12-30
Payer: COMMERCIAL

## 2023-12-30 DIAGNOSIS — B37.31 YEAST INFECTION OF THE VAGINA: Primary | ICD-10-CM

## 2023-12-30 RX ORDER — FLUCONAZOLE 150 MG/1
150 TABLET ORAL ONCE
Qty: 1 TABLET | Refills: 0 | Status: SHIPPED | OUTPATIENT
Start: 2023-12-30 | End: 2023-12-30

## 2023-12-30 NOTE — TELEPHONE ENCOUNTER
Called and notified patient that medication was sent to pharmacy, she verbalized a good understanding.

## 2023-12-30 NOTE — TELEPHONE ENCOUNTER
Test Results    Contacts         Type Contact Phone/Fax    12/30/2023 03:14 PM CST Phone (Incoming) Meseret Hatch (Self) [phone] (M)            Who ordered the test:  Marika Varghese NP     Type of test: Lab    Date of test:  12/29/2023    Where was the test performed:  [facility] [place]    What are your questions/concerns?:  Pt tested positive for yeast infection & is wondering if meds are being sent in or not? Wants to get them before tomorrow/Holiday weekend.     Could we send this information to you in [employer] or would you prefer to receive a phone call?:   Patient would prefer a phone call   Okay to leave a detailed message?: Yes at Cell number on file:    Telephone Information:   Mobile [phone]

## 2023-12-30 NOTE — TELEPHONE ENCOUNTER
"Nurse Triage SBAR    Is this a 2nd Level Triage? NO    Situation: Yeast infection    Background: Patient calling, states that her wet prep is positive for yeast and is asking for a prescription.  The result has not been reviewed by a physician.  Patient will try OTC remedies and if no improvement she will call back to have the on-call physician paged.     Assessment: Yeast infection    Protocol Recommended Disposition:   See PCP within 3 days    Recommendation: Patient will try an OTC treatment. Care advice given per protocol and call back precautions discussed. Patient verbalized understanding and agreement with the plan of care.      N/A    HUBERT HERRERA RN    Does the patient meet one of the following criteria for ADS visit consideration? 16+ years old, with an [facility] PCP     TIP  Providers, please consider if this condition is appropriate for management at one of our Acute and Diagnostic Services sites.     If patient is a good candidate, please use dotphrase <dot>triageresponse and select Refer to ADS to document.    Reason for Disposition   [1] Symptoms of a \"yeast infection\" (i.e., itchy, white discharge, not bad smelling) AND [2] not improved > 3 days following Care Advice    Additional Information   Negative: Followed a genital area injury (e.g., vagina, vulva)   Negative: Symptoms could be from sexual assault   Negative: Pain or burning with passing urine (urination) is main symptom   Negative: Vaginal discharge is main symptom   Negative: Pubic lice suspected   Negative: Pregnant   Negative: Patient sounds very sick or weak to the triager   Negative: [1] SEVERE pain AND [2] not improved 2 hours after pain medicine   Negative: [1] Genital area looks infected (e.g., draining sore, spreading redness) AND [2] fever   Negative: MODERATE-SEVERE itching (i.e., interferes with school, work, or sleep)   Negative: Genital area looks infected (e.g., draining sore, spreading redness)   Negative: Rash with " "painful tiny water blisters   Negative: [1] Rash (e.g., redness, tiny bumps, sore) of genital area AND [2] present > 24 hours   Negative: Tender lump (swelling or \"ball\") at vaginal opening    Protocols used: Vulvar Symptoms-A-AH    "

## 2024-03-11 ENCOUNTER — TELEPHONE (OUTPATIENT)
Dept: FAMILY MEDICINE | Facility: CLINIC | Age: 24
End: 2024-03-11
Payer: COMMERCIAL

## 2024-03-11 ENCOUNTER — E-VISIT (OUTPATIENT)
Dept: FAMILY MEDICINE | Facility: CLINIC | Age: 24
End: 2024-03-11
Payer: COMMERCIAL

## 2024-03-11 DIAGNOSIS — B37.31 YEAST VAGINITIS: Primary | ICD-10-CM

## 2024-03-11 DIAGNOSIS — B37.31 CANDIDAL VULVOVAGINITIS: ICD-10-CM

## 2024-03-11 PROCEDURE — 99421 OL DIG E/M SVC 5-10 MIN: CPT | Performed by: NURSE PRACTITIONER

## 2024-03-11 RX ORDER — FLUCONAZOLE 150 MG/1
150 TABLET ORAL ONCE
Qty: 1 TABLET | Refills: 2 | Status: SHIPPED | OUTPATIENT
Start: 2024-03-11 | End: 2024-03-11

## 2024-03-11 NOTE — TELEPHONE ENCOUNTER
Symptoms    Describe your symptoms: Pt is having slight itchiness, not cottage cheese texture, slightly whiter than usual discharge, slightly painful and wiping is a bit tender. No bumps. Says she has had this before and it was a yeast infection.     Any pain: No    How long have you been having symptoms: N/A - Forgot to ask.    Have you been seen for this:  Yes: In the past - I did offer appointment but pt said that Marika Varghese has prescribed this in the past.    Appointment offered?: Yes:     Triage offered?: No    Home remedies tried: No.    Preferred Pharmacy:   [id number] - [place], [place] - [address]  [address] [address]  Phone: [phone] Fax: [phone]      Could we send this information to you in [employer] or would you prefer to receive a phone call?:   Patient would prefer a phone call   Okay to leave a detailed message?: No at Cell number on file:    Telephone Information:   Mobile [phone]

## 2024-03-11 NOTE — TELEPHONE ENCOUNTER
Spoke with patient and advise either in clinic visit or eVisit. Patient does not have availability to come into clinic so she will start by submitting an eVisit.

## 2024-03-11 NOTE — PATIENT INSTRUCTIONS
Thank you for choosing us for your care. I have placed an order for a prescription so that you can start treatment. View your full visit summary for details by clicking on the link below. Your pharmacist will able to address any questions you may have about the medication.     If you re not feeling better within 2-3 days, please schedule an appointment.  You can schedule an appointment right here in [facility], or call [phone]  If the visit is for the same symptoms as your eVisit, we ll refund the cost of your eVisit if seen within seven days.    Vaginal Yeast Infection: Care Instructions  Overview     A vaginal yeast infection is the growth of too many yeast cells in the vagina. This is a common problem. Itching, vaginal discharge and irritation, and other symptoms can bother you. But yeast infections don't often cause other health problems.  Some medicines can increase your risk of getting a yeast infection. These include antibiotics, hormones, and steroids. You may also be more likely to get a yeast infection if you are pregnant, have diabetes, douche, or wear tight clothes.  With treatment, most yeast infections get better in a few days.  Follow-up care is a key part of your treatment and safety. Be sure to make and go to all appointments, and call your doctor if you are having problems. It's also a good idea to know your test results and keep a list of the medicines you take.  How can you care for yourself at home?  Take your medicines exactly as prescribed. Call your doctor if you think you are having a problem with your medicine.  Ask your doctor about over-the-counter (OTC) medicines for yeast infections. If you use an OTC treatment, read and follow all instructions on the label.  Don't use tampons while using a vaginal cream or suppository. The tampons can absorb the medicine. Use pads instead.  Wear loose cotton clothing. Don't wear nylon or other fabric that holds body heat and moisture close to the  "skin.  Try sleeping without underwear.  Don't scratch. Relieve itching with a cold pack or a cool bath.  Don't wash your vulva more than once a day. Use plain water or a mild, unscented soap. Air-dry the vulva.  Change out of wet or damp clothes as soon as possible.  If you are using a vaginal medicine, don't have sex until you have finished your treatment. But if you do have sex, don't depend on a condom or diaphragm for birth control. The oil in some vaginal medicines weakens latex.  Don't douche or use powders, sprays, or perfumes in your vagina or on your vulva. These items can change the normal balance of organisms in your vagina.  When should you call for help?   Call your doctor now or seek immediate medical care if:    You have new or increased pain in your vagina or pelvis.   Watch closely for changes in your health, and be sure to contact your doctor if:    You have unexpected vaginal bleeding.     You have a fever.     You are not getting better after 2 days.     Your symptoms come back after you finish your medicines.   Where can you learn more?  Go to https://www.[email or web address].net/patiented  Enter F639 in the search box to learn more about \"Vaginal Yeast Infection: Care Instructions.\"  Current as of: April 19, 2023               Content Version: 13.8    1590-1236 [employer].   Care instructions adapted under license by your healthcare professional. If you have questions about a medical condition or this instruction, always ask your healthcare professional. [employer] disclaims any warranty or liability for your use of this information.      "

## 2024-04-29 ENCOUNTER — OFFICE VISIT (OUTPATIENT)
Dept: FAMILY MEDICINE | Facility: CLINIC | Age: 24
End: 2024-04-29
Payer: COMMERCIAL

## 2024-04-29 VITALS
DIASTOLIC BLOOD PRESSURE: 78 MMHG | WEIGHT: 154 LBS | OXYGEN SATURATION: 97 % | HEIGHT: 63 IN | HEART RATE: 91 BPM | TEMPERATURE: 98.8 F | RESPIRATION RATE: 16 BRPM | SYSTOLIC BLOOD PRESSURE: 106 MMHG | BODY MASS INDEX: 27.29 KG/M2

## 2024-04-29 DIAGNOSIS — G43.E11 INTRACTABLE CHRONIC MIGRAINE WITH AURA WITH STATUS MIGRAINOSUS: Primary | ICD-10-CM

## 2024-04-29 LAB
ANION GAP SERPL CALCULATED.3IONS-SCNC: 10 MMOL/L (ref 7–15)
BASOPHILS # BLD AUTO: 0.1 10E3/UL (ref 0–0.2)
BASOPHILS NFR BLD AUTO: 1 %
BUN SERPL-MCNC: 21.6 MG/DL (ref 6–20)
CALCIUM SERPL-MCNC: 9.6 MG/DL (ref 8.6–10)
CHLORIDE SERPL-SCNC: 103 MMOL/L (ref 98–107)
CREAT SERPL-MCNC: 0.98 MG/DL (ref 0.51–0.95)
DEPRECATED HCO3 PLAS-SCNC: 24 MMOL/L (ref 22–29)
EGFRCR SERPLBLD CKD-EPI 2021: 82 ML/MIN/1.73M2
EOSINOPHIL # BLD AUTO: 0.2 10E3/UL (ref 0–0.7)
EOSINOPHIL NFR BLD AUTO: 2 %
ERYTHROCYTE [DISTWIDTH] IN BLOOD BY AUTOMATED COUNT: 11.6 % (ref 10–15)
GLUCOSE SERPL-MCNC: 96 MG/DL (ref 70–99)
HCT VFR BLD AUTO: 42.2 % (ref 35–47)
HGB BLD-MCNC: 14.4 G/DL (ref 11.7–15.7)
IMM GRANULOCYTES # BLD: 0 10E3/UL
IMM GRANULOCYTES NFR BLD: 0 %
LYMPHOCYTES # BLD AUTO: 2.5 10E3/UL (ref 0.8–5.3)
LYMPHOCYTES NFR BLD AUTO: 26 %
MCH RBC QN AUTO: 29.3 PG (ref 26.5–33)
MCHC RBC AUTO-ENTMCNC: 34.1 G/DL (ref 31.5–36.5)
MCV RBC AUTO: 86 FL (ref 78–100)
MONOCYTES # BLD AUTO: 0.8 10E3/UL (ref 0–1.3)
MONOCYTES NFR BLD AUTO: 9 %
NEUTROPHILS # BLD AUTO: 5.9 10E3/UL (ref 1.6–8.3)
NEUTROPHILS NFR BLD AUTO: 62 %
PLATELET # BLD AUTO: 338 10E3/UL (ref 150–450)
POTASSIUM SERPL-SCNC: 4.3 MMOL/L (ref 3.4–5.3)
RBC # BLD AUTO: 4.92 10E6/UL (ref 3.8–5.2)
SODIUM SERPL-SCNC: 137 MMOL/L (ref 135–145)
TSH SERPL DL<=0.005 MIU/L-ACNC: 3.11 UIU/ML (ref 0.3–4.2)
WBC # BLD AUTO: 9.4 10E3/UL (ref 4–11)

## 2024-04-29 PROCEDURE — G2211 COMPLEX E/M VISIT ADD ON: HCPCS | Performed by: NURSE PRACTITIONER

## 2024-04-29 PROCEDURE — 80048 BASIC METABOLIC PNL TOTAL CA: CPT | Performed by: NURSE PRACTITIONER

## 2024-04-29 PROCEDURE — 99214 OFFICE O/P EST MOD 30 MIN: CPT | Performed by: NURSE PRACTITIONER

## 2024-04-29 PROCEDURE — 36415 COLL VENOUS BLD VENIPUNCTURE: CPT | Performed by: NURSE PRACTITIONER

## 2024-04-29 PROCEDURE — 85025 COMPLETE CBC W/AUTO DIFF WBC: CPT | Mod: QW | Performed by: NURSE PRACTITIONER

## 2024-04-29 PROCEDURE — 84443 ASSAY THYROID STIM HORMONE: CPT | Performed by: NURSE PRACTITIONER

## 2024-04-29 RX ORDER — SUMATRIPTAN 50 MG/1
50 TABLET, FILM COATED ORAL
Qty: 18 TABLET | Refills: 0 | Status: SHIPPED | OUTPATIENT
Start: 2024-04-29

## 2024-04-29 ASSESSMENT — ASTHMA QUESTIONNAIRES
QUESTION_5 LAST FOUR WEEKS HOW WOULD YOU RATE YOUR ASTHMA CONTROL: WELL CONTROLLED
QUESTION_4 LAST FOUR WEEKS HOW OFTEN HAVE YOU USED YOUR RESCUE INHALER OR NEBULIZER MEDICATION (SUCH AS ALBUTEROL): ONCE A WEEK OR LESS
QUESTION_2 LAST FOUR WEEKS HOW OFTEN HAVE YOU HAD SHORTNESS OF BREATH: ONCE OR TWICE A WEEK
QUESTION_1 LAST FOUR WEEKS HOW MUCH OF THE TIME DID YOUR ASTHMA KEEP YOU FROM GETTING AS MUCH DONE AT WORK, SCHOOL OR AT HOME: NONE OF THE TIME
ACT_TOTALSCORE: 22
ACT_TOTALSCORE: 22
QUESTION_3 LAST FOUR WEEKS HOW OFTEN DID YOUR ASTHMA SYMPTOMS (WHEEZING, COUGHING, SHORTNESS OF BREATH, CHEST TIGHTNESS OR PAIN) WAKE YOU UP AT NIGHT OR EARLIER THAN USUAL IN THE MORNING: NOT AT ALL

## 2024-04-29 NOTE — PROGRESS NOTES
"  Assessment & Plan     (G43.E11) Intractable chronic migraine with aura with status migrainosus  (primary encounter diagnosis)  Comment:   Plan: TSH with free T4 reflex, Basic metabolic panel,        CBC with Platelets & Differential, SUMAtriptan         (IMITREX) 50 MG tablet        She is having migraine symptoms that do not tiffanie with Excedrin Migraine, she has vision changes and HA doesn't fully resolve, this HA continued for 9 days but finally ended about 5 days ago. She is in nursing school but doesn't feel stressed. NO new pets or exposures, no new meds or foods. She did have migraine in past and used Imitrex probably in the past but not for about 4 years. She had has pituitary adenoma but that was corrected and she has maintained on thyroid medication. Some fatigue. Will check some labs and restart imitrex and practice good sleep hygiene/water intake and f/up in 1 month for recheck          The longitudinal plan of care for the diagnosis(es)/condition(s) as documented were addressed during this visit. Due to the added complexity in care, I will continue to support Meseret in the subsequent management and with ongoing continuity of care.    BMI  Estimated body mass index is 27.5 kg/m  as calculated from the following:    Height as of this encounter: 1.594 m (5' 2.75\").    Weight as of this encounter: 69.9 kg (154 lb).             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 0 0 0   In the past 12 months, how many times were you hospitalized overnight because of your asthma? 0 0 0        History of Present Illness       Reason for visit:  Frequent migraines recently    She eats 2-3 servings of fruits and vegetables daily.She consumes 1 sweetened beverage(s) daily.She exercises with enough effort to increase her heart rate 30 to 60 minutes per day.  She exercises with enough effort to increase her heart rate 6 days per week.   She is taking medications regularly.                 Objective    /78 (BP Location: Right arm, Patient Position: Sitting)   Pulse 91   Temp 98.8  F (37.1  C)   Resp 16   Ht 1.594 m (5' 2.75\")   Wt 69.9 kg (154 lb)   LMP  (LMP Unknown)   SpO2 97%   Breastfeeding No   BMI 27.50 kg/m    Body mass index is 27.5 kg/m .  Physical Exam               Signed Electronically by: Marika Varghese NP    "

## 2024-05-27 ENCOUNTER — WALK IN (OUTPATIENT)
Dept: URGENT CARE | Age: 24
End: 2024-05-27

## 2024-05-27 VITALS
HEART RATE: 91 BPM | RESPIRATION RATE: 20 BRPM | TEMPERATURE: 98.7 F | SYSTOLIC BLOOD PRESSURE: 133 MMHG | DIASTOLIC BLOOD PRESSURE: 78 MMHG | WEIGHT: 153.11 LBS | OXYGEN SATURATION: 97 %

## 2024-05-27 DIAGNOSIS — J45.21 MILD INTERMITTENT ASTHMA WITH EXACERBATION (CMD): Primary | ICD-10-CM

## 2024-05-27 RX ORDER — LEVOTHYROXINE SODIUM 0.12 MG/1
125 TABLET ORAL DAILY
COMMUNITY
Start: 2023-12-18

## 2024-05-27 RX ORDER — PREDNISONE 20 MG/1
40 TABLET ORAL DAILY
Qty: 10 TABLET | Refills: 0 | Status: SHIPPED | OUTPATIENT
Start: 2024-05-27 | End: 2024-06-01

## 2024-05-27 RX ORDER — ALBUTEROL SULFATE 90 UG/1
2 AEROSOL, METERED RESPIRATORY (INHALATION)
COMMUNITY
Start: 2023-12-13

## 2024-06-07 ENCOUNTER — OFFICE VISIT (OUTPATIENT)
Dept: FAMILY MEDICINE | Facility: CLINIC | Age: 24
End: 2024-06-07
Payer: COMMERCIAL

## 2024-06-07 VITALS
HEIGHT: 63 IN | SYSTOLIC BLOOD PRESSURE: 108 MMHG | HEART RATE: 81 BPM | RESPIRATION RATE: 16 BRPM | DIASTOLIC BLOOD PRESSURE: 70 MMHG | BODY MASS INDEX: 27.61 KG/M2 | OXYGEN SATURATION: 98 % | TEMPERATURE: 98.3 F | WEIGHT: 155.8 LBS

## 2024-06-07 DIAGNOSIS — Z12.4 CERVICAL CANCER SCREENING: Primary | ICD-10-CM

## 2024-06-07 DIAGNOSIS — Z00.00 ROUTINE GENERAL MEDICAL EXAMINATION AT A HEALTH CARE FACILITY: ICD-10-CM

## 2024-06-07 DIAGNOSIS — J45.20 MILD INTERMITTENT ASTHMA WITHOUT COMPLICATION: ICD-10-CM

## 2024-06-07 DIAGNOSIS — G43.E11 INTRACTABLE CHRONIC MIGRAINE WITH AURA WITH STATUS MIGRAINOSUS: ICD-10-CM

## 2024-06-07 PROCEDURE — G0145 SCR C/V CYTO,THINLAYER,RESCR: HCPCS | Performed by: NURSE PRACTITIONER

## 2024-06-07 PROCEDURE — 90471 IMMUNIZATION ADMIN: CPT | Performed by: NURSE PRACTITIONER

## 2024-06-07 PROCEDURE — 90677 PCV20 VACCINE IM: CPT | Performed by: NURSE PRACTITIONER

## 2024-06-07 PROCEDURE — 99395 PREV VISIT EST AGE 18-39: CPT | Mod: 25 | Performed by: NURSE PRACTITIONER

## 2024-06-07 PROCEDURE — 99214 OFFICE O/P EST MOD 30 MIN: CPT | Mod: 25 | Performed by: NURSE PRACTITIONER

## 2024-06-07 RX ORDER — PREDNISONE 20 MG/1
2 TABLET ORAL
COMMUNITY
Start: 2024-05-27 | End: 2024-06-07

## 2024-06-07 RX ORDER — PROPRANOLOL HCL 60 MG
60 CAPSULE, EXTENDED RELEASE 24HR ORAL DAILY
Qty: 30 CAPSULE | Refills: 1 | Status: SHIPPED | OUTPATIENT
Start: 2024-06-07 | End: 2024-08-06

## 2024-06-07 RX ORDER — PREDNISONE 20 MG/1
TABLET ORAL
Qty: 10 TABLET | Refills: 1 | Status: SHIPPED | OUTPATIENT
Start: 2024-06-07

## 2024-06-07 RX ORDER — FLUTICASONE PROPIONATE 110 UG/1
1 AEROSOL, METERED RESPIRATORY (INHALATION) 2 TIMES DAILY
Qty: 12 G | Refills: 2 | Status: SHIPPED | OUTPATIENT
Start: 2024-06-07

## 2024-06-07 SDOH — HEALTH STABILITY: PHYSICAL HEALTH: ON AVERAGE, HOW MANY MINUTES DO YOU ENGAGE IN EXERCISE AT THIS LEVEL?: 40 MIN

## 2024-06-07 SDOH — HEALTH STABILITY: PHYSICAL HEALTH: ON AVERAGE, HOW MANY DAYS PER WEEK DO YOU ENGAGE IN MODERATE TO STRENUOUS EXERCISE (LIKE A BRISK WALK)?: 4 DAYS

## 2024-06-07 ASSESSMENT — SOCIAL DETERMINANTS OF HEALTH (SDOH): HOW OFTEN DO YOU GET TOGETHER WITH FRIENDS OR RELATIVES?: THREE TIMES A WEEK

## 2024-06-07 NOTE — LETTER
My Asthma Action Plan    Name: Meseret Hatch   YOB: 2000  Date: 6/7/2024   My doctor: Marika Varghese NP   My clinic: [facility]        My Rescue Medicine:   Albuterol inhaler (Proair/Ventolin/Proventil HFA)  2-4 puffs EVERY 4 HOURS as needed. Use a spacer if recommended by your provider.   My Asthma Severity:   Intermittent / Exercise Induced  Know your asthma triggers: upper respiratory infections             GREEN ZONE   Good Control  I feel good  No cough or wheeze  Can work, sleep and play without asthma symptoms       Take your asthma control medicine every day.     If exercise triggers your asthma, take your rescue medication  15 minutes before exercise or sports, and  During exercise if you have asthma symptoms  Spacer to use with inhaler: If you have a spacer, make sure to use it with your inhaler             YELLOW ZONE Getting Worse  I have ANY of these:  I do not feel good  Cough or wheeze  Chest feels tight  Wake up at night   Keep taking your Green Zone medications  Start taking your rescue medicine:  every 20 minutes for up to 1 hour. Then every 4 hours for 24-48 hours.  If you stay in the Yellow Zone for more than 12-24 hours, contact your doctor.  If you do not return to the Green Zone in 12-24 hours or you get worse, start taking your oral steroid medicine if prescribed by your provider.           RED ZONE Medical Alert - Get Help  I have ANY of these:  I feel awful  Medicine is not helping  Breathing getting harder  Trouble walking or talking  Nose opens wide to breathe       Take your rescue medicine NOW  If your provider has prescribed an oral steroid medicine, start taking it NOW  Call your doctor NOW  If you are still in the Red Zone after 20 minutes and you have not reached your doctor:  Take your rescue medicine again and  Call 911 or go to the emergency room right away    See your regular doctor within 2 weeks of an Emergency Room or Urgent Care  visit for follow-up treatment.          Annual Reminders:  Meet with Asthma Educator,  Flu Shot in the Fall, consider Pneumonia Vaccination for patients with asthma (aged 19 and older).    Pharmacy: [id number] [address] AT [address]    Electronically signed by Marika Varghese NP   Date: 06/07/24                    Asthma Triggers  How To Control Things That Make Your Asthma Worse    Triggers are things that make your asthma worse.  Look at the list below to help you find your triggers and   what you can do about them. You can help prevent asthma flare-ups by staying away from your triggers.      Trigger                                                          What you can do   Cigarette Smoke  Tobacco smoke can make asthma worse. Do not allow smoking in your home, car or around you.  Be sure no one smokes at a child s day care or school.  If you smoke, ask your health care provider for ways to help you quit.  Ask family members to quit too.  Ask your health care provider for a referral to Quit Plan to help you quit smoking, or call 3-658-850-PLAN.     Colds, Flu, Bronchitis  These are common triggers of asthma. Wash your hands often.  Don t touch your eyes, nose or mouth.  Get a flu shot every year.     Dust Mites  These are tiny bugs that live in cloth or carpet. They are too small to see. Wash sheets and blankets in hot water every week.   Encase pillows and mattress in dust mite proof covers.  Avoid having carpet if you can. If you have carpet, vacuum weekly.   Use a dust mask and HEPA vacuum.   Pollen and Outdoor Mold  Some people are allergic to trees, grass, or weed pollen, or molds. Try to keep your windows closed.  Limit time out doors when pollen count is high.   Ask you health care provider about taking medicine during allergy season.     Animal Dander  Some people are allergic to skin flakes, urine or saliva from pets with fur or feathers. Keep pets with  fur or feathers out of your home.    If you can t keep the pet outdoors, then keep the pet out of your bedroom.  Keep the bedroom door closed.  Keep pets off cloth furniture and away from stuffed toys.     Mice, Rats, and Cockroaches  Some people are allergic to the waste from these pests.   Cover food and garbage.  Clean up spills and food crumbs.  Store grease in the refrigerator.   Keep food out of the bedroom.   Indoor Mold  This can be a trigger if your home has high moisture. Fix leaking faucets, pipes, or other sources of water.   Clean moldy surfaces.  Dehumidify basement if it is damp and smelly.   Smoke, Strong Odors, and Sprays  These can reduce air quality. Stay away from strong odors and sprays, such as perfume, powder, hair spray, paints, smoke incense, paint, cleaning products, candles and new carpet.   Exercise or Sports  Some people with asthma have this trigger. Be active!  Ask your doctor about taking medicine before sports or exercise to prevent symptoms.    Warm up for 5-10 minutes before and after sports or exercise.     Other Triggers of Asthma  Cold air:  Cover your nose and mouth with a scarf.  Sometimes laughing or crying can be a trigger.  Some medicines and food can trigger asthma.

## 2024-06-07 NOTE — PROGRESS NOTES
"Preventive Care Visit  [facility]  Marika Varghese NP, Family Medicine  Jun 7, 2024      Assessment & Plan     (Z12.4) Cervical cancer screening  (primary encounter diagnosis)  Comment:   Plan: Pap Screen Only - Recommended Age 21 - 24 Years        Same partner, normal paps in past and neg sti screens, using IUD    (G43.E11) Intractable chronic migraine with aura with status migrainosus  Comment:   Plan: propranolol ER (INDERAL LA) 60 MG 24 hr capsule        Rxn to Imitrex, still with HA 4-8 times per month, will start preventative, educated on use risk/benefit propranolol    (J45.20) Mild intermittent asthma without complication  Comment:   Plan: predniSONE (DELTASONE) 20 MG tablet,         fluticasone (FLOVENT HFA) 110 MCG/ACT inhaler        Recent urgent care for flare,, states happens twice a year. Educated on steroid use inhaled and oral, will have some on hand    (Z00.00) Routine general medical examination at a health care facility  Comment:   Plan:             BMI  Estimated body mass index is 27.82 kg/m  as calculated from the following:    Height as of this encounter: 1.594 m (5' 2.75\").    Weight as of this encounter: 70.7 kg (155 lb 12.8 oz).       Counseling  Appropriate preventive services were discussed with this patient, including applicable screening as appropriate for fall prevention, nutrition, physical activity, Tobacco-use cessation, weight loss and cognition.  Checklist reviewing preventive services available has been given to the patient.  Reviewed patient's diet, addressing concerns and/or questions.           Missy Carl is a 23 year old, presenting for the following: patient is here today for her annual exam, follow up from urgent care visit 5/27/24 - cough, breathing issues, asthma flare up, also following up on migraines, Imitrex causes negative side effects, would like to try something else     Was in urgent care for asthma flare. Not sure why she " tray  Used her albuterol.  Didn't have neb.  She used to use Advair    She didn't feel good after using the Imitrex, it made her very fatigued for 2 days, she is recalling that she had great fatigue, will not use again. Having headaches still about 2 times per week  She had used amitrip for fibromyalgia  Physical        6/7/2024     7:48 AM   Additional Questions   Roomed by collin bonds   Accompanied by self        Health Care Directive  Patient does not have a Health Care Directive or Living Will:     HPI            6/7/2024   General Health   How would you rate your overall physical health? Good   Feel stress (tense, anxious, or unable to sleep) To some extent   (!) STRESS CONCERN      6/7/2024   Nutrition   Three or more servings of calcium each day? (!) NO   Diet: Regular (no restrictions)   How many servings of fruit and vegetables per day? (!) 2-3   How many sweetened beverages each day? 0-1         6/7/2024   Exercise   Days per week of moderate/strenous exercise 4 days   Average minutes spent exercising at this level 40 min         6/7/2024   Social Factors   Frequency of gathering with friends or relatives Three times a week   Worry food won't last until get money to buy more No   Food not last or not have enough money for food? No   Do you have housing?  Yes   Are you worried about losing your housing? No   Lack of transportation? No   Unable to get utilities (heat,electricity)? No         6/7/2024   Dental   Dentist two times every year? Yes            Today's PHQ-2 Score:       6/7/2024     7:25 AM   PHQ-2 ( 2000 [employer])   Q1: Little interest or pleasure in doing things 1   Q2: Feeling down, depressed or hopeless 1   PHQ-2 Score 2   Q1: Little interest or pleasure in doing things Several days   Q2: Feeling down, depressed or hopeless Several days   PHQ-2 Score 2           6/7/2024   Substance Use   Alcohol more than 3/day or more than 7/wk No   Do you use any other substances recreationally? (!) ALCOHOL  "    Social History     Tobacco Use    Smoking status: Never     Passive exposure: Never    Smokeless tobacco: Never   Vaping Use    Vaping status: Former   Substance Use Topics    Alcohol use: Yes     Comment: \"minimual\"    Drug use: Never           6/7/2024   STI Screening   New sexual partner(s) since last STI/HIV test? No     History of abnormal Pap smear: No - age 21-29 PAP every 3 years recommended        6/10/2021    12:04 PM   PAP / HPV   PAP-ABSTRACT See Scanned Document           This result is from an external source.           6/7/2024   Contraception/Family Planning   Questions about contraception or family planning No        Reviewed and updated as needed this visit by Provider     Meds    Surg Hx             Lab work is in process         Objective    Exam  /70 (BP Location: Right arm, Patient Position: Sitting)   Pulse 81   Temp 98.3  F (36.8  C)   Resp 16   Ht 1.594 m (5' 2.75\")   Wt 70.7 kg (155 lb 12.8 oz)   LMP  (LMP Unknown)   SpO2 98%   Breastfeeding No   BMI 27.82 kg/m     Estimated body mass index is 27.82 kg/m  as calculated from the following:    Height as of this encounter: 1.594 m (5' 2.75\").    Weight as of this encounter: 70.7 kg (155 lb 12.8 oz).    Physical Exam  GENERAL: alert and no distress  EYES: Eyes grossly normal to inspection, PERRL and conjunctivae and sclerae normal  HENT: ear canals and TM's normal, nose and mouth without ulcers or lesions  NECK: no adenopathy, no asymmetry, masses, or scars  RESP: lungs clear to auscultation - no rales, rhonchi or wheezes  CV: regular rate and rhythm, normal S1 S2, no S3 or S4, no murmur, click or rub, no peripheral edema  ABDOMEN: soft, nontender, no hepatosplenomegaly, no masses and bowel sounds normal   (female) w/bimanual: normal female external genitalia, normal urethral meatus, normal vaginal mucosa, and normal cervix/adnexa/uterus without masses or discharge, IUD strings appear in place  MS: no gross " musculoskeletal defects noted, no edema  SKIN: no suspicious lesions or rashes  NEURO: Normal strength and tone, mentation intact and speech normal  PSYCH: mentation appears normal, affect normal/bright        Signed Electronically by: Marika Varghese NP

## 2024-06-07 NOTE — PATIENT INSTRUCTIONS
"Preventive Care Advice   This is general advice we often give to help people stay healthy. Your care team may have specific advice just for you. Please talk to your care team about your own preventive care needs.  Lifestyle  Exercise at least 150 minutes each week (30 minutes a day, 5 days a week).  Do muscle strengthening activities 2 days a week. These help control your weight and prevent disease.  No smoking.  Wear sunscreen to prevent skin cancer.  Have your home tested for radon every 2 to 5 years. Radon is a colorless, odorless gas that can harm your lungs. To learn more, go to www.health.Count includes the [facility].mn. and search for \"Radon in Homes.\"  Keep guns unloaded and locked up in a safe place like a safe or gun vault, or, use a gun lock and hide the keys. Always lock away bullets separately. To learn more, visit [employer].[place].gov and search for \"safe gun storage.\"  Nutrition  Eat 5 or more servings of fruits and vegetables each day.  Try wheat bread, brown rice and whole grain pasta (instead of white bread, rice, and pasta).  Get enough calcium and vitamin D. Check the label on foods and aim for 100% of the RDA (recommended daily allowance).  Regular exams  Have a dental exam and cleaning every 6 months.  See your health care team every year to talk about:  Any changes in your health.  Any medicines your care team has prescribed.  Preventive care, family planning, and ways to prevent chronic diseases.  Shots (vaccines)   HPV shots (up to age 26), if you've never had them before.  Hepatitis B shots (up to age 59), if you've never had them before.  COVID-19 shot: Get this shot when it's due.  Flu shot: Get a flu shot every year.  Tetanus shot: Get a tetanus shot every 10 years.  Pneumococcal, hepatitis A, and RSV shots: Ask your care team if you need these based on your risk.  Shingles shot (for age 50 and up).  General health tests  Diabetes screening:  Starting at age 35, Get screened for diabetes at least every 3 years.  If " you are younger than age 35, ask your care team if you should be screened for diabetes.  Cholesterol test: At age 39, start having a cholesterol test every 5 years, or more often if advised.  Bone density scan (DEXA): At age 50, ask your care team if you should have this scan for osteoporosis (brittle bones).  Hepatitis C: Get tested at least once in your life.  Abdominal aortic aneurysm screening: Talk to your doctor about having this screening if you:  Have ever smoked; and  Are biologically male; and  Are between the ages of 65 and 75.  STIs (sexually transmitted infections)  Before age 24: Ask your care team if you should be screened for STIs.  After age 24: Get screened for STIs if you're at risk. You are at risk for STIs (including HIV) if:  You are sexually active with more than one person.  You don't use condoms every time.  You or a partner was diagnosed with a sexually transmitted infection.  If you are at risk for HIV, ask about PrEP medicine to prevent HIV.  Get tested for HIV at least once in your life, whether you are at risk for HIV or not.  Cancer screening tests  Cervical cancer screening: If you have a cervix, begin getting regular cervical cancer screening tests at age 21. Most people who have regular screenings with normal results can stop after age 65. Talk about this with your provider.  Breast cancer scan (mammogram): If you've ever had breasts, begin having regular mammograms starting at age 40. This is a scan to check for breast cancer.  Colon cancer screening: It is important to start screening for colon cancer at age 45.  Have a colonoscopy test every 10 years (or more often if you're at risk) Or, ask your provider about stool tests like a FIT test every year or Cologuard test every 3 years.  To learn more about your testing options, visit: www.[employer]/822532.pdf.  For help making a decision, visit: joseph/mo58727.  Prostate cancer screening test: If you have a prostate and are age 55  to 69, ask your provider if you would benefit from a yearly prostate cancer screening test.  Lung cancer screening: If you are a current or former smoker age 50 to 80, ask your care team if ongoing lung cancer screenings are right for you.  For informational purposes only. Not to replace the advice of your health care provider. Copyright   2023 [facility]. All rights reserved. Clinically reviewed by the [facility]. IN[employer] 712269 - REV 04/24.    Substance Use Disorder: Care Instructions  Overview     You can improve your life and health by stopping your use of alcohol or drugs. When you don't drink or use drugs, you may feel and sleep better. You may get along better with your family, friends, and coworkers. There are medicines and programs that can help with substance use disorder.  How can you care for yourself at home?  Here are some ways to help you stay sober and prevent relapse.  If you have been given medicine to help keep you sober or reduce your cravings, be sure to take it exactly as prescribed.  Talk to your doctor about programs that can help you stop using drugs or drinking alcohol.  Do not keep alcohol or drugs in your home.  Plan ahead. Think about what you'll say if other people ask you to drink or use drugs. Try not to spend time with people who drink or use drugs.  Use the time and money spent on drinking or drugs to do something that's important to you.  Preventing a relapse  Have a plan to deal with relapse. Learn to recognize changes in your thinking that lead you to drink or use drugs. Get help before you start to drink or use drugs again.  Try to stay away from situations, friends, or places that may lead you to drink or use drugs.  If you feel the need to drink alcohol or use drugs again, seek help right away. Call a trusted friend or family member. Some people get support from organizations such as Narcotics Anonymous or [employer] or from  treatment facilities.  If you relapse, get help as soon as you can. Some people make a plan with another person that outlines what they want that person to do for them if they relapse. The plan usually includes how to handle the relapse and who to notify in case of relapse.  Don't give up. Remember that a relapse doesn't mean that you have failed. Use the experience to learn the triggers that lead you to drink or use drugs. Then quit again. Recovery is a lifelong process. Many people have several relapses before they are able to quit for good.  Follow-up care is a key part of your treatment and safety. Be sure to make and go to all appointments, and call your doctor if you are having problems. It's also a good idea to know your test results and keep a list of the medicines you take.  When should you call for help?   Call 911  anytime you think you may need emergency care. For example, call if you or someone else:    Has overdosed or has withdrawal signs. Be sure to tell the emergency workers that you are or someone else is using or trying to quit using drugs. Overdose or withdrawal signs may include:  Losing consciousness.  Seizure.  Seeing or hearing things that aren't there (hallucinations).     Is thinking or talking about suicide or harming others.   Where to get help 24 hours a day, 7 days a week   If you or someone you know talks about suicide, self-harm, a mental health crisis, a substance use crisis, or any other kind of emotional distress, get help right away. You can:    Call the Suicide and Crisis Lifeline at 988.     Call 1-968-111-TALK (1-940.652.9166).     Text HOME to 485961 to access the Crisis Text Line.   Consider saving these numbers in your phone.  Go to [employer] for more information or to chat online.  Call your doctor now or seek immediate medical care if:    You are having withdrawal symptoms. These may include nausea or vomiting, sweating, shakiness, and anxiety.   Watch closely for  "changes in your health, and be sure to contact your doctor if:    You have a relapse.     You need more help or support to stop.   Where can you learn more?  Go to https://www.[employer].net/patiented  Enter H573 in the search box to learn more about \"Substance Use Disorder: Care Instructions.\"  Current as of: November 15, 2023               Content Version: 14.0    0747-2186 [employer].   Care instructions adapted under license by your healthcare professional. If you have questions about a medical condition or this instruction, always ask your healthcare professional. [employer] disclaims any warranty or liability for your use of this information.      "

## 2024-06-11 LAB
BKR LAB AP GYN ADEQUACY: NORMAL
BKR LAB AP GYN INTERPRETATION: NORMAL
BKR LAB AP HPV REFLEX: NO
BKR LAB AP PREVIOUS ABNORMAL: NORMAL
PATH REPORT.COMMENTS IMP SPEC: NORMAL
PATH REPORT.COMMENTS IMP SPEC: NORMAL
PATH REPORT.RELEVANT HX SPEC: NORMAL

## 2024-08-06 ENCOUNTER — MYC MEDICAL ADVICE (OUTPATIENT)
Dept: FAMILY MEDICINE | Facility: CLINIC | Age: 24
End: 2024-08-06
Payer: COMMERCIAL

## 2024-08-06 DIAGNOSIS — G43.E11 INTRACTABLE CHRONIC MIGRAINE WITH AURA WITH STATUS MIGRAINOSUS: ICD-10-CM

## 2024-08-06 RX ORDER — PROPRANOLOL HCL 60 MG
60 CAPSULE, EXTENDED RELEASE 24HR ORAL DAILY
Qty: 90 CAPSULE | Refills: 2 | Status: SHIPPED | OUTPATIENT
Start: 2024-08-06

## 2024-08-06 NOTE — TELEPHONE ENCOUNTER
Please see [employer] message. Patient requesting refill for next month of propanolol. Last OV 6/7/24. Pended for review if appropriate.

## 2025-05-08 ENCOUNTER — PATIENT OUTREACH (OUTPATIENT)
Dept: CARE COORDINATION | Facility: CLINIC | Age: 25
End: 2025-05-08
Payer: COMMERCIAL